# Patient Record
Sex: MALE | Race: BLACK OR AFRICAN AMERICAN
[De-identification: names, ages, dates, MRNs, and addresses within clinical notes are randomized per-mention and may not be internally consistent; named-entity substitution may affect disease eponyms.]

---

## 2019-01-09 ENCOUNTER — HOSPITAL ENCOUNTER (INPATIENT)
Dept: HOSPITAL 74 - JER | Age: 59
LOS: 9 days | Discharge: ANOTHER HOSPITAL | DRG: 378 | End: 2019-01-18
Attending: INTERNAL MEDICINE | Admitting: INTERNAL MEDICINE
Payer: COMMERCIAL

## 2019-01-09 VITALS — BODY MASS INDEX: 34.7 KG/M2

## 2019-01-09 DIAGNOSIS — F20.9: ICD-10-CM

## 2019-01-09 DIAGNOSIS — K52.9: ICD-10-CM

## 2019-01-09 DIAGNOSIS — R00.0: ICD-10-CM

## 2019-01-09 DIAGNOSIS — K63.5: ICD-10-CM

## 2019-01-09 DIAGNOSIS — K40.20: ICD-10-CM

## 2019-01-09 DIAGNOSIS — E78.5: ICD-10-CM

## 2019-01-09 DIAGNOSIS — K29.60: ICD-10-CM

## 2019-01-09 DIAGNOSIS — K64.8: ICD-10-CM

## 2019-01-09 DIAGNOSIS — Z91.14: ICD-10-CM

## 2019-01-09 DIAGNOSIS — D62: ICD-10-CM

## 2019-01-09 DIAGNOSIS — I10: ICD-10-CM

## 2019-01-09 DIAGNOSIS — I24.8: ICD-10-CM

## 2019-01-09 DIAGNOSIS — E11.65: ICD-10-CM

## 2019-01-09 DIAGNOSIS — B19.20: ICD-10-CM

## 2019-01-09 DIAGNOSIS — K62.5: ICD-10-CM

## 2019-01-09 DIAGNOSIS — K44.9: ICD-10-CM

## 2019-01-09 DIAGNOSIS — K57.91: Primary | ICD-10-CM

## 2019-01-09 DIAGNOSIS — K57.90: ICD-10-CM

## 2019-01-09 LAB
ALBUMIN SERPL-MCNC: 2.6 G/DL (ref 3.4–5)
ALP SERPL-CCNC: 46 U/L (ref 45–117)
ALT SERPL-CCNC: 10 U/L (ref 13–61)
ANION GAP SERPL CALC-SCNC: 5 MMOL/L (ref 8–16)
AST SERPL-CCNC: 5 U/L (ref 15–37)
BASOPHILS # BLD: 0.4 % (ref 0–2)
BILIRUB SERPL-MCNC: 0.3 MG/DL (ref 0.2–1)
BNP SERPL-MCNC: 984.2 PG/ML (ref 5–125)
BUN SERPL-MCNC: 18 MG/DL (ref 7–18)
CALCIUM SERPL-MCNC: 8.2 MG/DL (ref 8.5–10.1)
CHLORIDE SERPL-SCNC: 108 MMOL/L (ref 98–107)
CO2 SERPL-SCNC: 27 MMOL/L (ref 21–32)
CREAT SERPL-MCNC: 1.6 MG/DL (ref 0.55–1.3)
DEPRECATED RDW RBC AUTO: 12.5 % (ref 11.9–15.9)
EOSINOPHIL # BLD: 0.5 % (ref 0–4.5)
GLUCOSE SERPL-MCNC: 236 MG/DL (ref 74–106)
HCT VFR BLD CALC: 27.8 % (ref 35.4–49)
HGB BLD-MCNC: 9.5 GM/DL (ref 11.7–16.9)
INR BLD: 1.11 (ref 0.83–1.09)
LYMPHOCYTES # BLD: 9.5 % (ref 8–40)
MCH RBC QN AUTO: 32.3 PG (ref 25.7–33.7)
MCHC RBC AUTO-ENTMCNC: 34.2 G/DL (ref 32–35.9)
MCV RBC: 94.4 FL (ref 80–96)
MONOCYTES # BLD AUTO: 7.1 % (ref 3.8–10.2)
NEUTROPHILS # BLD: 82.5 % (ref 42.8–82.8)
PLATELET # BLD AUTO: 215 K/MM3 (ref 134–434)
PMV BLD: 8.5 FL (ref 7.5–11.1)
POTASSIUM SERPLBLD-SCNC: 4.1 MMOL/L (ref 3.5–5.1)
PROT SERPL-MCNC: 6.1 G/DL (ref 6.4–8.2)
PT PNL PPP: 13.1 SEC (ref 9.7–13)
RBC # BLD AUTO: 2.95 M/MM3 (ref 4–5.6)
SODIUM SERPL-SCNC: 140 MMOL/L (ref 136–145)
WBC # BLD AUTO: 8.3 K/MM3 (ref 4–10)

## 2019-01-09 PROCEDURE — P9038 RBC IRRADIATED: HCPCS

## 2019-01-09 PROCEDURE — P9058 RBC, L/R, CMV-NEG, IRRAD: HCPCS

## 2019-01-09 PROCEDURE — A9538 TC99M PYROPHOSPHATE: HCPCS

## 2019-01-09 PROCEDURE — P9017 PLASMA 1 DONOR FRZ W/IN 8 HR: HCPCS

## 2019-01-09 PROCEDURE — 30233N1 TRANSFUSION OF NONAUTOLOGOUS RED BLOOD CELLS INTO PERIPHERAL VEIN, PERCUTANEOUS APPROACH: ICD-10-PCS | Performed by: INTERNAL MEDICINE

## 2019-01-09 NOTE — HP
Admitting History and Physical





- Primary Care Physician


PCP: Luz Maria Calzada





- Admission


History of Present Illness: 


58 year old female, with a significant PMH of HTN, HLD, DM, and hepatitis C (

noncompliant with all of his prescribed medications), who presents to the 

emergency department today complaining of rectal bleeding for two days. Patient 

presents from [facility], with bright red blood dripping from his rectum down 

his right leg to his right foot. He does note a history of the same episode, 

but did not bother to have it checked and treated by a physician. Patient also 

complains of orthopnea upon examination in the ED today. He denies any 

abdominal pain. Patients HPI is limited secondary to patients unwillingness 

to cooperate, and is only concerned with going home. 








- Past Medical History


Cardiovascular: Yes: HTN, Hyperlipdemia


Endocrine: Yes: Diabetes Mellitus





- Smoking History


Smoking history: Unknown if ever smoked





Home Medications





- Allergies


Allergies/Adverse Reactions: 


 Allergies











Allergy/AdvReac Type Severity Reaction Status Date / Time


 


No Known Allergies Allergy   Verified 08/20/18 09:49














- Home Medications


Home Medications: 


Ambulatory Orders





ZyPREXA -  01/09/19 











Physical Examination


Vital Signs: 


 Vital Signs











Temperature  98.5 F   01/09/19 14:40


 


Pulse Rate  101 H  01/09/19 14:40


 


Respiratory Rate  20   01/09/19 14:40


 


Blood Pressure  113/56 L  01/09/19 14:40


 


O2 Sat by Pulse Oximetry (%)  97   01/09/19 14:40











Constitutional: Yes: No Distress


HENT: Yes: Atraumatic


Neck: Yes: Supple


Cardiovascular: Yes: Regular Rate and Rhythm


Respiratory: Yes: CTA Bilaterally


Gastrointestinal: Yes: Normal Bowel Sounds


Extremities: Yes: WNL


Edema: No


Peripheral Pulses WNL: Yes


Neurological: Yes: Alert, Oriented


Labs: 


 CBC, BMP





 01/09/19 12:47 





 01/09/19 12:47 











Imaging





- Results


X-ray: Report Reviewed





Problem List





- Problems


(1) Rectal bleeding


Assessment/Plan: 


not since in ER


stable


monitor h/h


npo


ivf


iv protonix


Code(s): K62.5 - HEMORRHAGE OF ANUS AND RECTUM   





(2) Benign essential HTN


Code(s): I10 - ESSENTIAL (PRIMARY) HYPERTENSION   





Assessment/Plan





 Laboratory Tests











  01/09/19 01/09/19 01/09/19





  12:47 12:47 12:47


 


WBC  8.3  


 


RBC  2.95 L  


 


Hgb  9.5 L  


 


Hct  27.8 L D  


 


MCV  94.4  


 


MCH  32.3  


 


MCHC  34.2  


 


RDW  12.5  


 


Plt Count  215  


 


MPV  8.5  D  


 


Absolute Neuts (auto)  6.8  


 


Neutrophils %  82.5  


 


Lymphocytes %  9.5  D  


 


Monocytes %  7.1  


 


Eosinophils %  0.5  


 


Basophils %  0.4  


 


Nucleated RBC %  0  


 


PT with INR   13.10 H 


 


INR   1.11 H 


 


Sodium   


 


Potassium   


 


Chloride   


 


Carbon Dioxide   


 


Anion Gap   


 


BUN   


 


Creatinine   


 


Creat Clearance w eGFR   


 


Random Glucose   


 


Calcium   


 


Total Bilirubin   


 


AST   


 


ALT   


 


Alkaline Phosphatase   


 


Creatine Kinase   


 


Troponin I   


 


B-Natriuretic Peptide   


 


Total Protein   


 


Albumin   


 


Stool Occult Blood    Positive


 


Blood Type   


 


Antibody Screen   














  01/09/19 01/09/19





  12:47 12:47


 


WBC  


 


RBC  


 


Hgb  


 


Hct  


 


MCV  


 


MCH  


 


MCHC  


 


RDW  


 


Plt Count  


 


MPV  


 


Absolute Neuts (auto)  


 


Neutrophils %  


 


Lymphocytes %  


 


Monocytes %  


 


Eosinophils %  


 


Basophils %  


 


Nucleated RBC %  


 


PT with INR  


 


INR  


 


Sodium  140 


 


Potassium  4.1 


 


Chloride  108 H 


 


Carbon Dioxide  27 


 


Anion Gap  5 L 


 


BUN  18 


 


Creatinine  1.6 H 


 


Creat Clearance w eGFR  44.94 


 


Random Glucose  236 H 


 


Calcium  8.2 L 


 


Total Bilirubin  0.3 


 


AST  5 L 


 


ALT  10 L 


 


Alkaline Phosphatase  46 


 


Creatine Kinase  110 


 


Troponin I  0.15 H 


 


B-Natriuretic Peptide  984.2 H 


 


Total Protein  6.1 L 


 


Albumin  2.6 L 


 


Stool Occult Blood  


 


Blood Type   O POSITIVE


 


Antibody Screen   Negative








Active Medications











Generic Name Dose Route Start Last Admin





  Trade Name Freq  PRN Reason Stop Dose Admin


 


Sodium Chloride  1,000 mls @ 100 mls/hr  01/09/19 14:00  01/09/19 14:02





  Normal Saline -  IV  01/09/19 23:59  100 mls/hr





  ASDIR STA   Administration

## 2019-01-09 NOTE — EKG
Test Reason : 

Blood Pressure : ***/*** mmHG

Vent. Rate : 114 BPM     Atrial Rate : 114 BPM

   P-R Int : 140 ms          QRS Dur : 082 ms

    QT Int : 334 ms       P-R-T Axes : 032 002 143 degrees

   QTc Int : 460 ms

 

SINUS TACHYCARDIA

T WAVE ABNORMALITY, CONSIDER LATERAL ISCHEMIA

ABNORMAL ECG

NO PREVIOUS ECGS AVAILABLE

Confirmed by ANNALEE KULKARNI, CIPRIANO ([id number]) on 1/9/2019 3:46:12 PM

 

Referred By:             Confirmed By:CIPRIANO MANTILLA MD

## 2019-01-09 NOTE — PDOC
History of Present Illness





- History of Present Illness


Initial Comments: 


The patient is a 58 year old female, with a significant PMH of HTN, HLD, DM, 

and hepatitis C (noncompliant with all of his prescribed medications), who 

presents to the emergency department today complaining of rectal bleeding for 

two days. Patient presents from [facility], with bright red blood dripping from 

his rectum down his right leg to his right foot. He does note a history of the 

same episode, but did not bother to have it checked and treated by a physician. 

Patient also complains of orthopnea upon examination in the ED today. He denies 

any abdominal pain. Patients HPI is limited secondary to patients 

unwillingness to cooperate, and is only concerned with going home. 





The patient denies chest pain, headache and dizziness.


Denies fever, chills, nausea, vomit, diarrhea and constipation.


Denies dysuria, frequency, urgency and hematuria.





Allergies: NKA


Past surgical history: None reported


Social history: Current smoker


PCP: Doesnt recall





19 13:47








<Pooja Lopez - Last Filed: 19 16:17>





- General


History Source: Patient


Exam Limitations: No Limitations





<Landy Wilhelm - Last Filed: 19 10:11>





- General


Chief Complaint: Rectal Bleed


Stated Complaint: Rectal Bleed


Time Seen by Provider: 19 12:11





Past History





<Pooja Lopez - Last Filed: 19 16:17>





- Suicide/Smoking/Psychosocial Hx


Smoking History: Unknown if ever smoked





<Landy Wilhelm - Last Filed: 19 10:11>





- Past Medical History


Allergies/Adverse Reactions: 


 Allergies











Allergy/AdvReac Type Severity Reaction Status Date / Time


 


No Known Allergies Allergy   Verified 18 09:49











Home Medications: 


Ambulatory Orders





ZyPREXA -  19 











**Review of Systems





- Review of Systems


Comments:: 


GENERAL/CONSTITUTIONAL: No fever or chills. No weakness.


HEAD, EYES, EARS, NOSE AND THROAT: No change in vision. No ear pain or 

discharge. No sore throat.


CARDIOVASCULAR: +Orthopnea. No chest pain.


RESPIRATORY: No cough, wheezing, or hemoptysis.


GASTROINTESTINAL: +Rectal bleeding (bright-red blood). No nausea, vomiting, 

diarrhea or constipation.


GENITOURINARY: No dysuria, frequency, or change in urination.


MUSCULOSKELETAL: No joint or muscle swelling or pain. No neck or back pain.


SKIN: No rash


NEUROLOGIC: No headache, vertigo, loss of consciousness, or change in strength/

sensation.


ENDOCRINE: No increased thirst. No abnormal weight change.


HEMATOLOGIC/LYMPHATIC: No anemia, easy bleeding, or history of blood clots.


ALLERGIC/IMMUNOLOGIC: No hives or skin allergy.





19 13:52








<Pooja Lopez - Last Filed: 19 16:17>





*Physical Exam





- Vital Signs


 Last Vital Signs











Temp Pulse Resp BP Pulse Ox


 


 98.4 F   112 H  18   110/56 L  97 


 


 19 11:44  19 11:44  19 11:44  19 11:44  19 12:50














- Physical Exam


Comments: 


GENERAL: The patient is awake, but unwilling to cooperate.


HEAD: Normal with no signs of trauma.


EYES: PERRLA, EOMI, sclera anicteric, conjunctiva clear.


ENT: Ears normal, nares patent, oropharynx clear without exudates. Moist mucous 

membranes.


NECK: Normal range of motion, supple without lymphadenopathy, JVD, or masses.


LUNGS: Breath sounds equal, clear to auscultation bilaterally. No wheezes, and 

no crackles.


HEART: +Tachycardic. Normal S1 and S2 without murmur, rub or gallop.


ABDOMEN: Soft, nontender, normoactive bowel sounds. No guarding, no rebound. No 

masses palpable.


RECTAL: +Clearly dried blood in rectum, traveling all the way down his right 

LE. No external hemorrhoids. 


EXTREMITIES: +Clearly  blood down right leg to right foot, soaking pant and 

sock. Normal range of motion, no edema. No clubbing or cyanosis. No erythema, 

or tenderness.


NEUROLOGICAL: Cranial nerves II through XII grossly intact. Normal speech. No 

focal neurological deficits.


MUSCULOSKELETAL: Back non-tender to palpation, no CVA tenderness


SKIN: +Dry, flaky skin. +Unkempt toenails. Warm, normal turgor, no rashes or 

lesions noted.





19 14:10








<Pooja Lopez - Last Filed: 19 16:17>





- Vital Signs


 Last Vital Signs











Temp Pulse Resp BP Pulse Ox


 


 98.4 F   112 H  18   110/56 L  100 


 


 19 11:44  19 11:44  19 11:44  19 11:44  19 11:44














<Landy Wilhelm - Last Filed: 19 10:11>





Moderate Sedation





- Procedure Monitoring


Vital Signs: 


Procedure Monitoring Vital Signs











Temperature  98.4 F   19 11:44


 


Pulse Rate  112 H  19 11:44


 


Respiratory Rate  18   19 11:44


 


Blood Pressure  110/56 L  19 11:44


 


O2 Sat by Pulse Oximetry (%)  97   19 12:50











<Pooja Lopez - Last Filed: 19 16:17>





- Procedure Monitoring


Vital Signs: 


Procedure Monitoring Vital Signs











Temperature  98.4 F   19 11:44


 


Pulse Rate  112 H  19 11:44


 


Respiratory Rate  18   19 11:44


 


Blood Pressure  110/56 L  19 11:44


 


O2 Sat by Pulse Oximetry (%)  100   19 11:44











<Landy Wilhelm - Last Filed: 19 10:11>





**Heart Score/ECG Review





- ECG Intrepretation


Comment:: 


Sinus tachycardia. T wave abnormality, consider later ischemia. Abnormal ECG.


19 13:00








<Pooja Lopez - Last Filed: 19 16:17>





ED Treatment Course





- LABORATORY


CBC & Chemistry Diagram: 


 19 12:47





 19 12:47





- RADIOLOGY


Radiograph Interpretation: 


EXAM#: TYPE/EXAM: RESULT: [id number] RAD/CHEST X-RAY PORTABLE*


Impression. Cardiomegaly. No evidence of pneumonia, CHF, pleural effusion. 


Reported By: Deshaun Garcia MD 19 13:25 


19 13:54








- Consult/PCP


Time Called: 13:38


Case discussed with personal care physician: Luz Maria Calzada (Spoke with Dr. Calzada concerning patient's care )


Case discussed with consulting physician: Verónica Nieves (4:15 Spoke with Dr. Nieves concerning patient's care)





<Pooja Lopez - Last Filed: 19 16:17>





- LABORATORY


CBC & Chemistry Diagram: 


 19 05:30





 19 05:30





<Landy Wilhelm - Last Filed: 19 10:11>





Medical Decision Making





- Critical Care Time


Total Critical Care Time (minutes): 60


Critical Care Statement: The care of this patient involved high complexity 

decision making to prevent further life threatening deterioration of the patient

's condition and/or to evaluate & treat vital organ system(s) failure or risk 

of failure.





- Medical Decision Making





19 13:13





EKG - NSR rate of 114 bpm, axis nml, intervals nml, no st elevation or 

depression, T wave inversion I, aVL, v5, v5





 Laboratory Tests











  16





  09:50 12:47 12:47


 


WBC  7.7  D  8.3 


 


Hgb  12.3  9.5 L 


 


Hct  38.3  27.8 L D 


 


Plt Count  263  215 


 


INR    1.11 H


 


Stool Occult Blood   














  19





  12:47


 


WBC 


 


Hgb 


 


Hct 


 


Plt Count 


 


INR 


 


Stool Occult Blood  Positive











19 14:24


 Laboratory Tests











  19





  12:47


 


Creatine Kinase  110


 


Troponin I  0.15 H


 


B-Natriuretic Peptide  984.2 H








Pt admitted to Dr Elsi Harkins intermediate


Will admit to tele


Call placed to GI





Clinical Impression: rectal bleeding, initial presentation


Hgb degreased from prior, initial presentation


Abnormal troponin, initial presentation








<Landy Wilhelm - Last Filed: 19 10:11>





*DC/Admit/Observation/Transfer





- Attestations


Scribe Attestion: 


Documentation prepared by DAVID Francis, acting as medical scribe for 

Landy Wilhelm MD/DO.


19 13:53








<Pooja Lopez - Last Filed: 19 16:17>





- Discharge Dispostion


Decision to Admit order: Yes





<Landy Wilhelm - Last Filed: 19 10:11>


Diagnosis at time of Disposition: 


 Rectal bleeding








- Discharge Dispostion


Condition at time of disposition: Fair

## 2019-01-09 NOTE — CONS
DATE OF CONSULTATION:  

 

DATE OF DICTATION:  01/09/2019

 

GASTROENTEROLOGY CONSULTATION 

 

HISTORY OF PRESENT ILLNESS:  The patient is a 58-year-old man with a past medical

history significant for hypertension, hyperlipidemia, diabetes, hepatitis C, who is

noncompliant with medications.  He presents to the emergency room with complaints of

a 2-day history of intermittent hematochezia.  He has had similar episodes in the

past but never bothered to have any evaluation performed.  He also complained of some

intermittent shortness of breath in the emergency room.  He states that he has had

intermittent abdominal pain located in the lower quadrant, denies nausea, vomiting,

hematemesis, diarrhea, or constipation or melena.  He is unclear about his history of

endoscopic evaluation.  He claims he has had colonoscopy approximately 10 years ago. 

He does not want to remain in the hospital and wishes to be discharged.

 

PAST MEDICAL AND SURGICAL HISTORY:  As listed in the HPI.

 

ALLERGIES:  No known drug allergies.

 

SOCIAL HISTORY:  He smokes cigarettes, drinks occasionally, no intravenous drug

abuse.  

 

FAMILY HISTORY:  Noncontributory.  

 

PHYSICAL EXAMINATION:

VITAL SIGNS:  Temperature 98, pulse 101, blood pressure 113/56, respirations 12,

oxygen saturation 97% on room air.  

GENERAL:  In no acute distress.  

HEENT:  Anicteric sclerae.  

CARDIOVASCULAR:  S1, S2, regular rate and rhythm.   

LUNGS:  Bilaterally clear to auscultation.  

ABDOMEN:  Soft, nontender.  

EXTREMITIES:  No edema.  

 

LABORATORY:  White blood cell count 8.3, hemoglobin and hematocrit 9.5/27, MCV 94,

platelet count 215, INR 1.1, sodium 140, potassium 4.1, BUN/creatinine 18/1.6, total

bilirubin 0.3, AST 5, ALT 10, alkaline phosphatase 46, troponin 0.15, .  

 

Chest x-ray reveals cardiomegaly, no evidence of pneumonia, CHF, or pleural effusion.

 

 

IMPRESSION:  Hematochezia with intermittent complaints of lower abdominal pain

including a differential diagnosis is colitis, _____, adenocarcinoma cannot be

excluded at this time.  He is hemodynamically stable without sign of an overt

gastrointestinal bleed.

 

RECOMMENDATION:  Considering he is having abdominal pain, I will order a CT scan of

the abdomen and pelvis to further evaluate his pain.  Will place him also on a clear

liquid diet.  If CT scan is within normal limits, he would benefit from a diagnostic

upper endoscopy and colonoscopy, this can be done on Friday.  Avoid NSAID.  Trend

hemoglobin and hematocrit daily while hospitalized.  

 

 

DO WILLY GORDILLO/[id number]

DD: 01/09/2019 19:16

DT: 01/09/2019 20:09

Job #:  [id number]

## 2019-01-10 LAB
ALBUMIN SERPL-MCNC: 2.4 G/DL (ref 3.4–5)
ALP SERPL-CCNC: 43 U/L (ref 45–117)
ALT SERPL-CCNC: 9 U/L (ref 13–61)
ANION GAP SERPL CALC-SCNC: 4 MMOL/L (ref 8–16)
AST SERPL-CCNC: 7 U/L (ref 15–37)
BASOPHILS # BLD: 0.3 % (ref 0–2)
BILIRUB SERPL-MCNC: 0.2 MG/DL (ref 0.2–1)
BUN SERPL-MCNC: 12 MG/DL (ref 7–18)
CALCIUM SERPL-MCNC: 7.9 MG/DL (ref 8.5–10.1)
CHLORIDE SERPL-SCNC: 110 MMOL/L (ref 98–107)
CO2 SERPL-SCNC: 27 MMOL/L (ref 21–32)
CREAT SERPL-MCNC: 1.1 MG/DL (ref 0.55–1.3)
DEPRECATED RDW RBC AUTO: 12.4 % (ref 11.9–15.9)
DEPRECATED RDW RBC AUTO: 12.4 % (ref 11.9–15.9)
EOSINOPHIL # BLD: 1.2 % (ref 0–4.5)
GLUCOSE SERPL-MCNC: 140 MG/DL (ref 74–106)
HCT VFR BLD CALC: 24 % (ref 35.4–49)
HCT VFR BLD CALC: 24.4 % (ref 35.4–49)
HGB BLD-MCNC: 7.8 GM/DL (ref 11.7–16.9)
HGB BLD-MCNC: 8.3 GM/DL (ref 11.7–16.9)
LYMPHOCYTES # BLD: 24.6 % (ref 8–40)
MCH RBC QN AUTO: 30.7 PG (ref 25.7–33.7)
MCH RBC QN AUTO: 32 PG (ref 25.7–33.7)
MCHC RBC AUTO-ENTMCNC: 32.3 G/DL (ref 32–35.9)
MCHC RBC AUTO-ENTMCNC: 34 G/DL (ref 32–35.9)
MCV RBC: 94.1 FL (ref 80–96)
MCV RBC: 95.1 FL (ref 80–96)
MONOCYTES # BLD AUTO: 10.8 % (ref 3.8–10.2)
NEUTROPHILS # BLD: 63.1 % (ref 42.8–82.8)
PLATELET # BLD AUTO: 180 K/MM3 (ref 134–434)
PLATELET # BLD AUTO: 215 K/MM3 (ref 134–434)
PMV BLD: 8.1 FL (ref 7.5–11.1)
PMV BLD: 8.6 FL (ref 7.5–11.1)
POTASSIUM SERPLBLD-SCNC: 4.1 MMOL/L (ref 3.5–5.1)
PROT SERPL-MCNC: 5.5 G/DL (ref 6.4–8.2)
RBC # BLD AUTO: 2.53 M/MM3 (ref 4–5.6)
RBC # BLD AUTO: 2.6 M/MM3 (ref 4–5.6)
SODIUM SERPL-SCNC: 142 MMOL/L (ref 136–145)
WBC # BLD AUTO: 8 K/MM3 (ref 4–10)
WBC # BLD AUTO: 8.2 K/MM3 (ref 4–10)

## 2019-01-10 NOTE — CON.CARD
Consult


Consult Specialty:: Cardiology


Referred by:: Luz Maria Calzada MD


Reason for Consultation:: Pre-procedural cardiovascular evaluation





- History of Present Illness


Chief Complaint: Hematochezia


History of Present Illness: 


58 year old female, with a significant PMH of HTN, HLD, DM, schizophrenia, and 

hepatitis C (noncompliant with all of his prescribed medications), presented 

with rectal bleeding for two days without abdominal pain, nausea, emesis, fevers

, chills CT scan shows right-sided colitis with bilateral inguinal hernias. 

Hematochezia has since resolved, he reports being asymptomatic from CV-

standpoint and denies chest pain, dyspnea, near or true syncope, palpitations, 

orthopnea, PND, LE edema. Tolerating colonoscopy prep.





- Past Medical History


Cardio/Vascular: Yes: HTN, Hyperlipdemia


Endocrine: Yes: Diabetes Mellitus





- Alcohol/Substance Use


Hx Alcohol Use: Yes





- Smoking History


Smoking history: Unknown if ever smoked


Have you smoked in the past 12 months: Yes


Aproximately how many cigarettes per day: 20





Home Medications





- Allergies


Allergies/Adverse Reactions: 


 Allergies











Allergy/AdvReac Type Severity Reaction Status Date / Time


 


No Known Allergies Allergy   Verified 08/20/18 09:49














- Home Medications


Home Medications: 


Ambulatory Orders





ZyPREXA -  01/09/19 











Review of Systems





- Review of Systems


Gastrointestinal: reports: Rectal Bleeding


Vital Signs: 


 Vital Signs











Temperature  98.4 F   01/10/19 15:24


 


Pulse Rate  108 H  01/10/19 15:24


 


Respiratory Rate  18   01/10/19 15:24


 


Blood Pressure  129/79   01/10/19 15:24


 


O2 Sat by Pulse Oximetry (%)  97   01/10/19 09:00











Constitutional: Yes: No Distress, Calm


Neck: Yes: Supple


Respiratory: Yes: Regular, CTA Bilaterally


Gastrointestinal: Yes: Soft, Hernia (Bilateral inguinal hernias), Hypoactive 

Bowel Sounds, Rectal Bleeding


Cardiovascular: Yes: Tachycardia


JVD: No


Carotid Bruit: No


Heart Sounds: Yes: S1, S2


Edema: No





- Other Data


Labs, Other Data: 


 CBC, BMP





 01/10/19 13:00 





 01/10/19 06:22 





 INR, PTT











INR  1.11  (0.83-1.09)  H  01/09/19  12:47    











ST @ 114 lateral TWI








Imaging





- Results


Chest X-ray: Report Reviewed (NAD)





Problem List





- Problems


(1) Colitis


Code(s): K52.9 - NONINFECTIVE GASTROENTERITIS AND COLITIS, UNSPECIFIED   





(2) Inguinal hernia bilateral, non-recurrent


Code(s): K40.20 - BI INGUINAL HERNIA, W/O OBST OR GANGRENE, NOT SPCF AS RECUR   


Qualifiers: 


   Obstruction and gangrene presence: without obstruction or gangrene 





(3) Pre-procedural cardiovascular examination


Code(s): Z01.810 - ENCOUNTER FOR PREPROCEDURAL CARDIOVASCULAR EXAMINATION   





(4) Rectal bleeding


Code(s): K62.5 - HEMORRHAGE OF ANUS AND RECTUM   





(5) Anemia


Code(s): D64.9 - ANEMIA, UNSPECIFIED   


Qualifiers: 


   Other causes of anemia: acute posthemorrhagic 





Assessment/Plan


01/10/2019 Extensive thickening and inflammatory changes right colon c/w colitis

, large bilateral inguinal hernias





1. Hematochezia referable to right-sided colitis


2. Acute anemia


3. Bilateral inguinal hernias





P:1. Given absence of symptoms of acute coronary syndrome, decompensated CHF or 

malignant arrhythmia, may proceed with colonoscopy from CV-standpoint once 

colitis resolved


2. Clear diet, check stool studies


3. Monitor H/H and for signs of overt bleeding


4. Thank you for consultative opportunity

## 2019-01-10 NOTE — PN
Progress Note, Physician


History of Present Illness: 





no more bleeding





- Current Medication List


Current Medications: 


Active Medications





Sodium Chloride (Normal Saline -)  1,000 mls @ 100 mls/hr IV ASDIR ZENOBIA


   Last Admin: 01/09/19 23:06 Dose:  Not Given











- Objective


Vital Signs: 


 Vital Signs











Temperature  98.4 F   01/10/19 15:24


 


Pulse Rate  108 H  01/10/19 15:24


 


Respiratory Rate  18   01/10/19 15:24


 


Blood Pressure  129/79   01/10/19 15:24


 


O2 Sat by Pulse Oximetry (%)  97   01/10/19 09:00











Constitutional: Yes: No Distress


HENT: Yes: Atraumatic


Neck: Yes: Supple


Cardiovascular: Yes: Regular Rate and Rhythm


Respiratory: Yes: CTA Bilaterally


Gastrointestinal: Yes: Normal Bowel Sounds


Extremities: Yes: WNL


Edema: No


Peripheral Pulses WNL: Yes


Neurological: Yes: Alert, Oriented


Labs: 


 CBC, BMP





 01/10/19 13:00 





 01/10/19 06:22 





 INR, PTT











INR  1.11  (0.83-1.09)  H  01/09/19  12:47    














Problem List





- Problems


(1) Rectal bleeding


Assessment/Plan: 


no more 


stable


monitor h/h


npo


ivf


iv protonix


gi and cardio consults...done


for colonoscopy tomorrow


Code(s): K62.5 - HEMORRHAGE OF ANUS AND RECTUM   





(2) Benign essential HTN


Code(s): I10 - ESSENTIAL (PRIMARY) HYPERTENSION

## 2019-01-10 NOTE — PN
GI Progress Note


Subjective: 





 this mornNo overt bleeding today


hgb 7.8 this morning


Patient going to vending machine to eat solide food


Denies abdominal pain


Refused CT scan abdomen last night


  





- Objective


Vital Signs: 


 Vital Signs











Temperature  98.4 F   01/10/19 06:00


 


Pulse Rate  86   01/10/19 06:00


 


Respiratory Rate  18   01/10/19 06:00


 


Blood Pressure  120/55 L  01/10/19 06:00


 


O2 Sat by Pulse Oximetry (%)  98   01/09/19 21:00











Constitutional: Calm


Eyes: No: Sclera Icterus


Cardiovascular: Yes: Regular Rate and Rhythm


Respiratory: Yes: CTA Bilaterally


Gastrointestinal Inspection: Yes: Hernia (b/l inguinal hernias. RIH reducible).

  No: Distention


...Auscultate: Yes: Normoactive Bowel Sounds


...Palpate: No: Hepatomegaly, Splenomegaly, Tenderness, Tenderness, Rebound


...Percussion: No: Tympanitic


Edema: No (No LE edema)


Neurological: Yes: Alert


Labs: 


 CBC, BMP





 01/10/19 06:22 





 INR, PTT











INR  1.11  (0.83-1.09)  H  01/09/19  12:47    














Problem List





- Problems


(1) Rectal bleeding


Assessment/Plan: 


No overt bleeding today


Discussed colonoscopy with Mr. Huang.  Discussed potential risks of the 

procedure like but not limited to bleeding, perforation, infection, sedation, 

death  he has agreed to the procedure


Agree with CT scan.  Will help assess inguinal hernias.  Consider surical 

evaluation


Clear diet


Monitor H/H and for signs of overt bleeding.  If continued overt bleeding and 

change in hemodynamics, transfer to ICU setting





Code(s): K62.5 - HEMORRHAGE OF ANUS AND RECTUM

## 2019-01-11 LAB
AMPHET UR-MCNC: NEGATIVE NG/ML
ANION GAP SERPL CALC-SCNC: 2 MMOL/L (ref 8–16)
APPEARANCE UR: CLEAR
BARBITURATES UR-MCNC: NEGATIVE NG/ML
BASOPHILS # BLD: 0.5 % (ref 0–2)
BENZODIAZ UR SCN-MCNC: NEGATIVE NG/ML
BILIRUB UR STRIP.AUTO-MCNC: NEGATIVE MG/DL
BUN SERPL-MCNC: 6 MG/DL (ref 7–18)
CALCIUM SERPL-MCNC: 8 MG/DL (ref 8.5–10.1)
CHLORIDE SERPL-SCNC: 108 MMOL/L (ref 98–107)
CO2 SERPL-SCNC: 30 MMOL/L (ref 21–32)
COCAINE UR-MCNC: NEGATIVE NG/ML
COLOR UR: YELLOW
CREAT SERPL-MCNC: 1.1 MG/DL (ref 0.55–1.3)
DEPRECATED RDW RBC AUTO: 12.4 % (ref 11.9–15.9)
EOSINOPHIL # BLD: 1.6 % (ref 0–4.5)
EPITH CASTS URNS QL MICRO: (no result) /HPF
GLUCOSE SERPL-MCNC: 137 MG/DL (ref 74–106)
HCT VFR BLD CALC: 23.4 % (ref 35.4–49)
HGB BLD-MCNC: 7.5 GM/DL (ref 11.7–16.9)
KETONES UR QL STRIP: NEGATIVE
LEUKOCYTE ESTERASE UR QL STRIP.AUTO: NEGATIVE
LYMPHOCYTES # BLD: 33.5 % (ref 8–40)
MCH RBC QN AUTO: 30.6 PG (ref 25.7–33.7)
MCHC RBC AUTO-ENTMCNC: 31.9 G/DL (ref 32–35.9)
MCV RBC: 95.7 FL (ref 80–96)
METHADONE UR-MCNC: NEGATIVE NG/ML
MONOCYTES # BLD AUTO: 8.9 % (ref 3.8–10.2)
MUCOUS THREADS URNS QL MICRO: (no result)
NEUTROPHILS # BLD: 55.5 % (ref 42.8–82.8)
NITRITE UR QL STRIP: NEGATIVE
OPIATES UR QL SCN: NEGATIVE NG/ML
PCP UR QL SCN: NEGATIVE NG/ML
PH UR: 5 [PH] (ref 5–8)
PLATELET # BLD AUTO: 200 K/MM3 (ref 134–434)
PMV BLD: 8.5 FL (ref 7.5–11.1)
POTASSIUM SERPLBLD-SCNC: 3.9 MMOL/L (ref 3.5–5.1)
PROT UR QL STRIP: (no result)
PROT UR QL STRIP: (no result)
RBC # BLD AUTO: 2.45 M/MM3 (ref 4–5.6)
SODIUM SERPL-SCNC: 139 MMOL/L (ref 136–145)
SP GR UR: 1.02 (ref 1.01–1.03)
UROBILINOGEN UR STRIP-MCNC: 2 MG/DL (ref 0.2–1)
WBC # BLD AUTO: 8.4 K/MM3 (ref 4–10)

## 2019-01-11 NOTE — PN
Progress Note, Physician


History of Present Illness: 





no more bleeding





- Current Medication List


Current Medications: 


Active Medications





Sodium Chloride (Normal Saline -)  1,000 mls @ 100 mls/hr IV ASDIR ZENOBIA


   Last Admin: 01/09/19 23:06 Dose:  Not Given











- Objective


Vital Signs: 


 Vital Signs











Temperature  98.3 F   01/11/19 15:24


 


Pulse Rate  93 H  01/11/19 15:24


 


Respiratory Rate  18   01/11/19 15:24


 


Blood Pressure  135/71   01/11/19 15:24


 


O2 Sat by Pulse Oximetry (%)  98   01/10/19 21:00











Constitutional: Yes: No Distress


HENT: Yes: Atraumatic


Neck: Yes: Supple


Cardiovascular: Yes: Regular Rate and Rhythm


Respiratory: Yes: CTA Bilaterally


Gastrointestinal: Yes: Normal Bowel Sounds


Extremities: Yes: WNL


Neurological: Yes: Alert, Oriented


Labs: 


 CBC, BMP





 01/11/19 05:30 





 01/11/19 05:30 





 INR, PTT











INR  1.11  (0.83-1.09)  H  01/09/19  12:47    














Problem List





- Problems


(1) Rectal bleeding


Assessment/Plan: 


no more 


stable


monitor h/h


iv protonix


gi and cardio consults...done


for colonoscopy was cancelled for today


possibly on monday


Code(s): K62.5 - HEMORRHAGE OF ANUS AND RECTUM   





(2) Benign essential HTN


Code(s): I10 - ESSENTIAL (PRIMARY) HYPERTENSION

## 2019-01-11 NOTE — PN
Progress Note (short form)





- Note


Progress Note: 





Patient drank almost a quarter of prep and had 2 brown BM's yesterday, non 

overnight.  No bleeding.  CT scan revealing right sided colitis and b/; 

inguinal hernias.  The right hernia contains cecum and portion of terminal ileum

,  The left hernia contains portion of sigmoid.  Given that right sided colitis 

was observed, explained that complete bowel prep will be needed for 

visualization.  The left inguinal hernia was reducible.  The right inguinal 

hernia may preclude complete evaluation of it cannot be reduced the time of the 

procedure. EGD/Colon cancelled for today, plan for monday.  Transfuse 1 U PRBC





Problem List





- Problems


(1) Rectal bleeding


Code(s): K62.5 - HEMORRHAGE OF ANUS AND RECTUM

## 2019-01-11 NOTE — PN
Progress Note, Physician


History of Present Illness: 


Denies recurrent hematochezia or abd pain. 





- Current Medication List


Current Medications: 


Active Medications





Sodium Chloride (Normal Saline -)  1,000 mls @ 100 mls/hr IV ASDIR ZENOBIA


   Last Admin: 01/09/19 23:06 Dose:  Not Given











- Objective


Vital Signs: 


 Vital Signs











Temperature  98.6 F   01/11/19 02:00


 


Pulse Rate  87   01/11/19 02:00


 


Respiratory Rate  18   01/11/19 02:00


 


Blood Pressure  138/76   01/11/19 02:00


 


O2 Sat by Pulse Oximetry (%)  98   01/10/19 21:00











Constitutional: Yes: No Distress, Calm


Neck: Yes: Supple


Cardiovascular: Yes: Regular Rate and Rhythm


Respiratory: Yes: Regular, CTA Bilaterally


Gastrointestinal: Yes: Soft, Hypoactive Bowel Sounds


Edema: No


Labs: 


 CBC, BMP





 01/11/19 05:30 





 01/11/19 05:30 





 INR, PTT











INR  1.11  (0.83-1.09)  H  01/09/19  12:47    














- ....Imaging


EKG: Report Reviewed (Tele: NSR)





Problem List





- Problems


(1) Colitis


Code(s): K52.9 - NONINFECTIVE GASTROENTERITIS AND COLITIS, UNSPECIFIED   





(2) Inguinal hernia bilateral, non-recurrent


Code(s): K40.20 - BI INGUINAL HERNIA, W/O OBST OR GANGRENE, NOT SPCF AS RECUR   


Qualifiers: 


   Obstruction and gangrene presence: without obstruction or gangrene 





(3) Pre-procedural cardiovascular examination


Code(s): Z01.810 - ENCOUNTER FOR PREPROCEDURAL CARDIOVASCULAR EXAMINATION   





(4) Rectal bleeding


Code(s): K62.5 - HEMORRHAGE OF ANUS AND RECTUM   





(5) Anemia


Code(s): D64.9 - ANEMIA, UNSPECIFIED   


Qualifiers: 


   Other causes of anemia: acute posthemorrhagic 





Assessment/Plan


01/10/2019 Extensive thickening and inflammatory changes right colon c/w colitis

, large bilateral inguinal hernias





1. Hematochezia referable to right-sided colitis


2. Acute anemia


3. Bilateral inguinal hernias





P:1. Given absence of symptoms of acute coronary syndrome, decompensated CHF or 

malignant arrhythmia, may proceed with EGD/colonoscopy from CV-standpoint once 

colitis resolved


2. Clear diet, check stool studies, transfuse to maintain Hgb>8.0


3. Monitor H/H and for signs of overt bleeding


4. D/c telemetry

## 2019-01-12 LAB
ANISOCYTOSIS BLD QL: (no result)
BASOPHILS # BLD: 0.4 % (ref 0–2)
DEPRECATED RDW RBC AUTO: 12.9 % (ref 11.9–15.9)
EOSINOPHIL # BLD: 1.3 % (ref 0–4.5)
HCT VFR BLD CALC: 26.8 % (ref 35.4–49)
HGB BLD-MCNC: 8.6 GM/DL (ref 11.7–16.9)
LYMPHOCYTES # BLD: 36.3 % (ref 8–40)
MACROCYTES BLD QL: 0
MCH RBC QN AUTO: 30.5 PG (ref 25.7–33.7)
MCHC RBC AUTO-ENTMCNC: 32 G/DL (ref 32–35.9)
MCV RBC: 95.2 FL (ref 80–96)
MONOCYTES # BLD AUTO: 7.3 % (ref 3.8–10.2)
NEUTROPHILS # BLD: 54.7 % (ref 42.8–82.8)
PLATELET # BLD AUTO: 283 K/MM3 (ref 134–434)
PLATELET BLD QL SMEAR: NORMAL
PMV BLD: 8.5 FL (ref 7.5–11.1)
RBC # BLD AUTO: 2.81 M/MM3 (ref 4–5.6)
WBC # BLD AUTO: 7.9 K/MM3 (ref 4–10)

## 2019-01-12 RX ADMIN — PANTOPRAZOLE SODIUM SCH MG: 40 TABLET, DELAYED RELEASE ORAL at 19:22

## 2019-01-12 NOTE — PN
Progress Note, Physician


History of Present Illness: 


Denies recurrent hematochezia or abd pain. 





- Current Medication List


Current Medications: 


Active Medications





Bisacodyl (Dulcolax -)  20 mg PO ONCE ONE


   Stop: 01/13/19 13:01











- Objective


Vital Signs: 


 Vital Signs











Temperature  98.2 F   01/12/19 09:07


 


Pulse Rate  96 H  01/12/19 09:07


 


Respiratory Rate  20   01/12/19 09:07


 


Blood Pressure  138/69   01/12/19 09:07


 


O2 Sat by Pulse Oximetry (%)  96   01/12/19 09:17











Constitutional: Yes: No Distress, Calm


Neck: Yes: Supple


Cardiovascular: Yes: Regular Rate and Rhythm


Respiratory: Yes: Regular, Diminished


Gastrointestinal: Yes: Soft, Hypoactive Bowel Sounds


Edema: No


Labs: 


 CBC, BMP





 01/12/19 07:00 





 01/11/19 05:30 





 INR, PTT











INR  1.11  (0.83-1.09)  H  01/09/19  12:47    














Problem List





- Problems


(1) Colitis


Code(s): K52.9 - NONINFECTIVE GASTROENTERITIS AND COLITIS, UNSPECIFIED   





(2) Inguinal hernia bilateral, non-recurrent


Code(s): K40.20 - BI INGUINAL HERNIA, W/O OBST OR GANGRENE, NOT SPCF AS RECUR   


Qualifiers: 


   Obstruction and gangrene presence: without obstruction or gangrene 





(3) Pre-procedural cardiovascular examination


Code(s): Z01.810 - ENCOUNTER FOR PREPROCEDURAL CARDIOVASCULAR EXAMINATION   





(4) Rectal bleeding


Code(s): K62.5 - HEMORRHAGE OF ANUS AND RECTUM   





(5) Anemia


Code(s): D64.9 - ANEMIA, UNSPECIFIED   


Qualifiers: 


   Other causes of anemia: acute posthemorrhagic 





Assessment/Plan


01/10/2019 Extensive thickening and inflammatory changes right colon c/w colitis

, large bilateral inguinal hernias





1. Hematochezia referable to right-sided colitis


2. Acute anemia


3. Bilateral inguinal hernias





P:1. Given absence of symptoms of acute coronary syndrome, decompensated CHF or 

malignant arrhythmia, may proceed with EGD/colonoscopy from CV-standpoint once 

colitis resolved


2. Clear diet, check stool studies, transfuse to maintain Hgb>8.0


3. Monitor H/H and for signs of overt bleeding

## 2019-01-12 NOTE — PN
Progress Note, Physician





- Current Medication List


Current Medications: 


Active Medications





Bisacodyl (Dulcolax -)  20 mg PO ONCE ONE


   Stop: 01/13/19 13:01











- Objective


Vital Signs: 


 Vital Signs











Temperature  98.2 F   01/12/19 09:07


 


Pulse Rate  96 H  01/12/19 09:07


 


Respiratory Rate  20   01/12/19 09:07


 


Blood Pressure  138/69   01/12/19 09:07


 


O2 Sat by Pulse Oximetry (%)  96   01/12/19 09:17











Constitutional: Yes: No Distress


HENT: Yes: Atraumatic


Neck: Yes: Supple


Cardiovascular: Yes: Regular Rate and Rhythm


Respiratory: Yes: CTA Bilaterally


Gastrointestinal: Yes: Normal Bowel Sounds


Extremities: Yes: WNL


Edema: No


Peripheral Pulses WNL: Yes


Neurological: Yes: Alert, Oriented


Labs: 


 CBC, BMP





 01/12/19 07:00 





 01/11/19 05:30 





 INR, PTT











INR  1.11  (0.83-1.09)  H  01/09/19  12:47    














Problem List





- Problems


(1) Rectal bleeding


Assessment/Plan: 


no more bleeding pt said he had some early in morning but  not witnessed


stable


monitor h/h...transfuse prbc


po protonix


for colonoscopy possibly on monday


Code(s): K62.5 - HEMORRHAGE OF ANUS AND RECTUM   





(2) Benign essential HTN


Code(s): I10 - ESSENTIAL (PRIMARY) HYPERTENSION

## 2019-01-12 NOTE — PN
Progress Note (short form)





- Note


Progress Note: 





patient denied abdominal pain today.  Described rectal bleeding earlier this 

morning to nurse but none rest of day.  No wittnessed. Plan for EGD/COlon 1/14.

  Reenforced the need for complete bowel prep.  Surgical eval of significant b/

l inguinal hernias.  I eplxinaed to mr. murillo that the hernias may preclude 

completion of colonoscopy. 





 





Problem List





- Problems


(1) Rectal bleeding


Code(s): K62.5 - HEMORRHAGE OF ANUS AND RECTUM

## 2019-01-13 LAB
DEPRECATED RDW RBC AUTO: 12.8 % (ref 11.9–15.9)
HCT VFR BLD CALC: 18.7 % (ref 35.4–49)
HGB BLD-MCNC: 6.3 GM/DL (ref 11.7–16.9)
MCH RBC QN AUTO: 31.8 PG (ref 25.7–33.7)
MCHC RBC AUTO-ENTMCNC: 33.6 G/DL (ref 32–35.9)
MCV RBC: 94.7 FL (ref 80–96)
PLATELET # BLD AUTO: 339 K/MM3 (ref 134–434)
PMV BLD: 7.4 FL (ref 7.5–11.1)
RBC # BLD AUTO: 1.98 M/MM3 (ref 4–5.6)
WBC # BLD AUTO: 10.2 K/MM3 (ref 4–10)

## 2019-01-13 RX ADMIN — PANTOPRAZOLE SODIUM SCH MG: 40 TABLET, DELAYED RELEASE ORAL at 09:46

## 2019-01-13 RX ADMIN — CHLORHEXIDINE GLUCONATE SCH APPLIC: 213 SOLUTION TOPICAL at 21:54

## 2019-01-13 RX ADMIN — MUPIROCIN SCH: 20 OINTMENT TOPICAL at 21:54

## 2019-01-13 NOTE — PN
Progress Note, Physician


History of Present Illness: 


Denies recurrent hematochezia or abd pain, tolerating prep.





- Current Medication List


Current Medications: 


Active Medications





Pantoprazole Sodium (Protonix -)  40 mg PO DAILY ZENOBIA


   Last Admin: 01/13/19 09:46 Dose:  40 mg











- Objective


Vital Signs: 


 Vital Signs











Temperature  98.5 F   01/13/19 15:11


 


Pulse Rate  103 H  01/13/19 15:11


 


Respiratory Rate  20   01/13/19 15:11


 


Blood Pressure  108/57 L  01/13/19 15:11


 


O2 Sat by Pulse Oximetry (%)  97   01/13/19 10:00











Constitutional: Yes: No Distress, Calm


Neck: Yes: Supple


Cardiovascular: Yes: Regular Rate and Rhythm


Respiratory: Yes: Regular, CTA Bilaterally


Gastrointestinal: Yes: Soft, Hypoactive Bowel Sounds


Edema: No


Labs: 


 CBC, BMP





 01/12/19 07:00 





 01/11/19 05:30 





 INR, PTT











INR  1.11  (0.83-1.09)  H  01/09/19  12:47    














Problem List





- Problems


(1) Colitis


Code(s): K52.9 - NONINFECTIVE GASTROENTERITIS AND COLITIS, UNSPECIFIED   





(2) Inguinal hernia bilateral, non-recurrent


Code(s): K40.20 - BI INGUINAL HERNIA, W/O OBST OR GANGRENE, NOT SPCF AS RECUR   


Qualifiers: 


   Obstruction and gangrene presence: without obstruction or gangrene 





(3) Pre-procedural cardiovascular examination


Code(s): Z01.810 - ENCOUNTER FOR PREPROCEDURAL CARDIOVASCULAR EXAMINATION   





(4) Rectal bleeding


Code(s): K62.5 - HEMORRHAGE OF ANUS AND RECTUM   





(5) Anemia


Code(s): D64.9 - ANEMIA, UNSPECIFIED   


Qualifiers: 


   Other causes of anemia: acute posthemorrhagic 





Assessment/Plan


01/10/2019 Extensive thickening and inflammatory changes right colon c/w colitis

, large bilateral inguinal hernias





1. Hematochezia referable to right-sided colitis


2. Acute anemia


3. Bilateral inguinal hernias





P:1. Given absence of symptoms of acute coronary syndrome, decompensated CHF or 

malignant arrhythmia, may proceed with EGD/colonoscopy from CV-standpoint once 

colitis resolved


2. Clear diet, colon prep, transfuse to maintain Hgb>8.0


3. Monitor H/H and for signs of overt bleeding

## 2019-01-13 NOTE — CONSULT
Consult


Consult Specialty:: Pulm/CCM


Referred by:: Dr. Calzada


Reason for Consultation:: Bleeding per rectum





- History of Present Illness


Chief Complaint: rectal bleeding


History of Present Illness: 





59 yo M h/o HTN, HLD, DM, schizophrenia, hepatitis C initially admitted to  

for bleeding per rectum x 2 days. He's also found to have R sided colitis w/ 

bilateral inguinal hernias. Patient reported 2 episodes of bright red blood per 

rectum but unable to quantify the amount. He had similar episodes few months 

ago and underwent colonoscopy. He denies fever, chills, dizziness, chest pain, 

shortness of breath, n/v, abd pain.





- History Source


History Provided By: Patient


Limitations to Obtaining History: No Limitations





- Past Medical History


Cardio/Vascular: Yes: HTN, Hyperlipdemia


Hepatobiliary: Yes: Hepatitis C (pt does not know about)


Psych: Yes: Schizophrenia


Endocrine: Yes: Diabetes Mellitus





- Past Surgical History


Past Surgical History: Yes: None





- Alcohol/Substance Use


Hx Alcohol Use: Yes (pt states not for last year in [facility] program)


History of Substance Use: reports: Marijuana (not for last year per pt while in 

program)





- Smoking History


Smoking history: Current every day smoker


Have you smoked in the past 12 months: Yes


Aproximately how many cigarettes per day: 20





- Social History


ADL: Independent


Occupation: unemployed





Home Medications





- Allergies


Allergies/Adverse Reactions: 


 Allergies











Allergy/AdvReac Type Severity Reaction Status Date / Time


 


No Known Allergies Allergy   Verified 08/20/18 09:49














- Home Medications


Home Medications: 


Ambulatory Orders





ZyPREXA -  01/09/19 











Review of Systems





- Review of Systems


Constitutional: denies: Chills, Fever


Cardiovascular: denies: Chest Pain, Shortness of Breath


Respiratory: denies: Cough, SOB


Gastrointestinal: reports: Rectal Bleeding.  denies: Nausea, Vomiting





Physical Exam


Vital Signs: 


 Vital Signs











Temperature  98.3 F   01/13/19 18:30


 


Pulse Rate  105 H  01/13/19 18:30


 


Respiratory Rate  24 H  01/13/19 18:30


 


Blood Pressure  107/62   01/13/19 18:30


 


O2 Sat by Pulse Oximetry (%)  97   01/13/19 10:00











Constitutional: Yes: No Distress, Calm


Eyes: Yes: Conjunctiva Clear, EOM Intact


HENT: Yes: Atraumatic, Normocephalic


Cardiovascular: Yes: Regular Rate and Rhythm, S1, S2.  No: Murmur


Respiratory: Yes: CTA Bilaterally


Gastrointestinal: Yes: Normal Bowel Sounds, Abdomen, Obese, Hernia, Rectal 

Bleeding.  No: Tenderness, Vomiting


Edema: No


Neurological: Yes: Alert, Oriented


Labs: 


 CBC, BMP





 01/13/19 16:55 





 01/11/19 05:30 











Assessment/Plan





59 yo M h/o GI bleed admitted to ICU for observation. 





GI:





LGIB


1. prep for EGD/colonoscopy tomorrow


2. establish 2 18g or larger accesses


3. receiving 2 PRBCs


4. AM lab





b/l inguinal hernia


1. possible repair after colonoscopy


2. surgery onboard





Hep C


1. never treated


2. GI following





Neuro:





Schizophrenia


1. primary team to confirm zyprexa dose








Noe Castro PGY3


ICU resident











Visit type





- Emergency Visit


Emergency Visit: No





- New Patient


This patient is new to me today: Yes


Date on this admission: 01/15/19





- Critical Care


Critical Care patient: Yes


Total Critical Care Time (in minutes): 40


Critical Care Statement: The care of this patient involved high complexity 

decision making to prevent further life threatening deterioration of the patient

's condition and/or to evaluate & treat vital organ system(s) failure or risk 

of failure.

## 2019-01-13 NOTE — CONSULT
Consult


Consult Specialty:: General Surgery


Referred by:: Dr. Durand


Reason for Consultation:: bilateral inguinal hernias with colon content





- History of Present Illness


Chief Complaint: bilateral groin/lower abdominal pain, rectal bleeding


History of Present Illness: 





58yoM smoker with HTN, HLD, DM, hep C (per chart), schizophrenia, goes to [facility] for the last year or so for an alcohol/MJ outpatient program, was sent 

from there a few days ago for rectal bleeding. He has received blood this 

admission, and last Hb was 8.6 yesterday. EGD/colonoscopy was planned Friday, 

but he did not drink enough of the prep, and is also found to have large 

bilateral inguinal hernias with colonic content in both. Surgery is asked to 

evaluate the hernias. He is a poor historian, but answers questions. He notes 

that he had rectal bleeding but not diarrhea or constipation for a few days only

, and one episode about a month prior, but had not previously sought attention 

for it. He denies HA/dizziness/N/V/previous episodes before those noted. Denies 

urinary problems or recent illness. Denies history of surgery. Per chart, he 

has been noncompliant with home meds for most medical issues, but he does say 

he has been on Zyprexa for his schizophrenia, though he does not know dose or 

frequency. He is seen and examined in bed, starting Golytely prep. Encouraged 

to drink as much as quickly as possible, one cup at a time. Last BM was bloody, 

per nursing and patient. He describes some abdominal pain in bilateral lower 

abdomen/groins just recently (few days?), and does not know how long he has had 

the hernias - few weeks? few months? He would be interested in discussing 

repair at some point. He is concerned about being able to go home and continue 

his program.





- History Source


History Provided By: Patient, Medical Record


Limitations to Obtaining History: Poor Historian





- Past Medical History


Cardio/Vascular: Yes: HTN, Hyperlipdemia


Hepatobiliary: Yes: Hepatitis C (pt does not know about)


Psych: Yes: Schizophrenia


Endocrine: Yes: Diabetes Mellitus





- Past Surgical History


Past Surgical History: Yes: None





- Alcohol/Substance Use


Hx Alcohol Use: Yes (pt states not for last year in [facility] program)


History of Substance Use: reports: Marijuana (not for last year per pt while in 

program)





- Smoking History


Smoking history: Current every day smoker


Have you smoked in the past 12 months: Yes


Aproximately how many cigarettes per day: 20





- Social History


ADL: Independent


Occupation: unemployed





Home Medications





- Allergies


Allergies/Adverse Reactions: 


 Allergies











Allergy/AdvReac Type Severity Reaction Status Date / Time


 


No Known Allergies Allergy   Verified 08/20/18 09:49














- Home Medications


Home Medications: 


Ambulatory Orders





ZyPREXA -  01/09/19 








Home Medications (free text): pt does not know zyprexa dose nor frequency, but 

has been on it a long time





Family Disease History





- Family Disease History


Family History: Unable to Obtain





Review of Systems





- Review of Systems


Constitutional: denies: Chills, Fever


Eyes: denies: Blurred Vision, Recent Change in Vision


HENT: denies: Difficult Swallowing, Nasal Congestion, Throat Pain


Neck: denies: Swollen Glands, Tenderness


Cardiovascular: denies: Chest Pain, Palpitations


Respiratory: denies: Cough, SOB


Gastrointestinal: reports: Abdominal Pain (with hpi; bilateral lower/groin pain)

, Rectal Bleeding (with hpi).  denies: Constipation, Diarrhea, Nausea, Vomiting


Genitourinary: denies: Burning, Dysuria


Musculoskeletal: denies: Back Pain, Joint Pain, Muscle Pain


Integumentary: denies: Change in Color, Rash


Neurological: denies: Dizziness, Headache


Psychiatric: denies: Anxiety, Depression





Physical Exam


Vital Signs: 


 Vital Signs











Temperature  98.5 F   01/13/19 15:11


 


Pulse Rate  103 H  01/13/19 15:11


 


Respiratory Rate  20   01/13/19 15:11


 


Blood Pressure  108/57 L  01/13/19 15:11


 


O2 Sat by Pulse Oximetry (%)  97   01/13/19 10:00











Constitutional: Yes: Well Nourished, No Distress, Calm


Eyes: Yes: Conjunctiva Clear, EOM Intact


HENT: Yes: Atraumatic, Normocephalic


Neck: Yes: Supple, Trachea Midline


Cardiovascular: Yes: Tachycardia.  No: Pulse Irregular


Respiratory: Yes: Regular, CTA Bilaterally


Gastrointestinal: Yes: Normal Bowel Sounds, Soft, Abdomen, Obese, Hernia (

bilateral inguinal with bowel content, mostly reducible on both sides, not 

really tender except at internal rings once mostly reduced; most of bulges are 

above scrotum, not really in it), Other (no scars).  No: Tenderness


...Rectal Exam: Yes: Deferred


Renal/: No: CVA Tenderness - Left, CVA Tenderness - Right


Musculoskeletal: No: Joint Stiffness, Joint Swelling


Extremities: No: Cool, Cyanosis


Edema: No


Peripheral Pulses WNL: Yes


Integumentary: No: Jaundice, Rash


Neurological: Yes: Alert, Oriented


Psychiatric: Yes: Alert, Oriented


Labs: 


 CBC, BMP





 01/12/19 07:00 





 01/11/19 05:30 





 CMP











Sodium  139 mmol/L (136-145)   01/11/19  05:30    


 


Potassium  3.9 mmol/L (3.5-5.1)   01/11/19  05:30    


 


Chloride  108 mmol/L ()  H  01/11/19  05:30    


 


Carbon Dioxide  30 mmol/L (21-32)   01/11/19  05:30    


 


Anion Gap  2 MMOL/L (8-16)  L  01/11/19  05:30    


 


BUN  6 mg/dL (7-18)  L  01/11/19  05:30    


 


Creatinine  1.1 mg/dL (0.55-1.3)   01/11/19  05:30    


 


Creat Clearance w eGFR  > 60  (>60)   01/11/19  05:30    


 


Random Glucose  137 mg/dL ()  H  01/11/19  05:30    


 


Calcium  8.0 mg/dL (8.5-10.1)  L  01/11/19  05:30    


 


Total Bilirubin  0.2 mg/dL (0.2-1)   01/10/19  06:22    


 


AST  7 U/L (15-37)  L  01/10/19  06:22    


 


ALT  9 U/L (13-61)  L  01/10/19  06:22    


 


Alkaline Phosphatase  43 U/L ()  L  01/10/19  06:22    


 


Creatine Kinase  110 IU/L ()   01/09/19  12:47    


 


Troponin I  0.15 ng/ml (0.00-0.05)  H  01/09/19  12:47    


 


B-Natriuretic Peptide  984.2 pg/ml (5-125)  H  01/09/19  12:47    


 


Total Protein  5.5 g/dl (6.4-8.2)  L  01/10/19  06:22    


 


Albumin  2.4 g/dl (3.4-5.0)  L  01/10/19  06:22    








 INR, PTT











INR  1.11  (0.83-1.09)  H  01/09/19  12:47    








 Urine Test Results











Urine Color  Yellow   01/11/19  17:15    


 


Urine Appearance  Clear   01/11/19  17:15    


 


Urine pH  5.0  (5.0-8.0)   01/11/19  17:15    


 


Ur Specific Gravity  1.022  (1.010-1.035)   01/11/19  17:15    


 


Urine Protein  1+  (NEGATIVE)  H  01/11/19  17:15    


 


Urine Glucose (UA)  3+  (NEGATIVE)  H  01/11/19  17:15    


 


Urine Ketones  Negative  (NEGATIVE)   01/11/19  17:15    


 


Urine Blood  Negative  (NEGATIVE)   01/11/19  17:15    


 


Urine Nitrite  Negative  (NEGATIVE)   01/11/19  17:15    


 


Urine Bilirubin  Negative  (<2.0 mg/dL)   01/11/19  17:15    


 


Ur Leukocyte Esterase  Negative  (NEGATIVE)   01/11/19  17:15    


 


Ur Epithelial Cells  Rare /HPF (FEW)   01/11/19  17:15    


 


Urine Mucus  Few   01/11/19  17:15    














Imaging





- Results


Cat Scan: Report Reviewed, Image Reviewed (images personally reviewed - 

bilateral inguinal hernias with sigmoid on left, cecum and portion of colon on 

right; either right colon or proximal transverse with significant segment of 

wall thickening, suspicious for mass vs colitis; no obstruction, contrast 

throughout colon)





Problem List





- Problems


(1) Rectal bleeding


Assessment/Plan: 


trend H/H


keep T&S up to date


s/p transfusion


+ blood per rectum today per nursing, pt


plan for EGD/colonoscopy tomorrow pending adequate prep


can assist with reduction of hernias for procedure


CT suspicious for colonic mass vs colitis, especially given bleeding


Code(s): K62.5 - HEMORRHAGE OF ANUS AND RECTUM   





(2) Anemia


Code(s): D64.9 - ANEMIA, UNSPECIFIED   


Qualifiers: 


   Anemia type: other cause   Other causes of anemia: acute posthemorrhagic   

Qualified Code(s): D62 - Acute posthemorrhagic anemia   





(3) Inguinal hernia bilateral, non-recurrent


Assessment/Plan: 


mostly reducible bilaterally, difficult to tell if completely reduced, as 

internal rings are both quite large


content not really into scrotal sac, mostly above on both sides, may be direct 

vs indirect


pending results of colonoscopy, if completable, can discuss possibility of 

repair if desired - if colon with mass, cannot use mesh at same operation as 

colon resection for hernia repair


will f/u with GI in am


Code(s): K40.20 - BI INGUINAL HERNIA, W/O OBST OR GANGRENE, NOT SPCF AS RECUR   


Qualifiers: 


   Obstruction and gangrene presence: without obstruction or gangrene   

Recurrence: non-recurrent   Qualified Code(s): K40.20 - Bilateral inguinal 

hernia, without obstruction or gangrene, not specified as recurrent   





(4) Hypertension


Code(s): I10 - ESSENTIAL (PRIMARY) HYPERTENSION   


Qualifiers: 


   Hypertension type: essential hypertension   Qualified Code(s): I10 - 

Essential (primary) hypertension   





(5) Schizophrenia


Assessment/Plan: 


pt does not know home dose but is on zyprexa - consider checking with [facility]

? unclear if they would know... no notes visible in [facility]


given outpatient program for EtOH/MJ abuse - consider psych consult or [facility] liaison to see?


Code(s): F20.9 - SCHIZOPHRENIA, UNSPECIFIED   


Qualifiers: 


   Schizophrenia type: unspecified   Qualified Code(s): F20.9 - Schizophrenia, 

unspecified

## 2019-01-13 NOTE — PN
Progress Note, Physician





- Current Medication List


Current Medications: 


Active Medications





Chlorhexidine Gluconate (Hibiclens For Decolonization -)  1 applic TP HS ZENOBIA


Mupirocin (Bactroban Ointment (For Decolonization) -)  1 applic NS BID [facility]


   Stop: 01/18/19 21:59


Pantoprazole Sodium (Protonix -)  40 mg PO DAILY [facility]


   Last Admin: 01/13/19 09:46 Dose:  40 mg











- Objective


Vital Signs: 


 Vital Signs











Temperature  98.3 F   01/13/19 18:30


 


Pulse Rate  105 H  01/13/19 18:30


 


Respiratory Rate  24 H  01/13/19 18:30


 


Blood Pressure  107/62   01/13/19 18:30


 


O2 Sat by Pulse Oximetry (%)  97   01/13/19 10:00











Labs: 


 CBC, BMP





 01/13/19 16:55 





 01/11/19 05:30 





 INR, PTT











INR  1.11  (0.83-1.09)  H  01/09/19  12:47

## 2019-01-14 LAB
ANION GAP SERPL CALC-SCNC: 6 MMOL/L (ref 8–16)
APTT BLD: 30.8 SECONDS (ref 25.2–36.5)
BASOPHILS # BLD: 0.2 % (ref 0–2)
BASOPHILS # BLD: 0.3 % (ref 0–2)
BUN SERPL-MCNC: 9 MG/DL (ref 7–18)
CALCIUM SERPL-MCNC: 7.2 MG/DL (ref 8.5–10.1)
CHLORIDE SERPL-SCNC: 111 MMOL/L (ref 98–107)
CO2 SERPL-SCNC: 26 MMOL/L (ref 21–32)
CREAT SERPL-MCNC: 1.3 MG/DL (ref 0.55–1.3)
DEPRECATED RDW RBC AUTO: 16.1 % (ref 11.9–15.9)
DEPRECATED RDW RBC AUTO: 16.5 % (ref 11.9–15.9)
DEPRECATED RDW RBC AUTO: 16.8 % (ref 11.9–15.9)
DEPRECATED RDW RBC AUTO: 17 % (ref 11.9–15.9)
EOSINOPHIL # BLD: 0.4 % (ref 0–4.5)
EOSINOPHIL # BLD: 0.5 % (ref 0–4.5)
GLUCOSE SERPL-MCNC: 150 MG/DL (ref 74–106)
HCT VFR BLD CALC: 17.4 % (ref 35.4–49)
HCT VFR BLD CALC: 22.5 % (ref 35.4–49)
HCT VFR BLD CALC: 22.6 % (ref 35.4–49)
HCT VFR BLD CALC: 24.2 % (ref 35.4–49)
HGB BLD-MCNC: 5.9 GM/DL (ref 11.7–16.9)
HGB BLD-MCNC: 7.3 GM/DL (ref 11.7–16.9)
HGB BLD-MCNC: 7.9 GM/DL (ref 11.7–16.9)
HGB BLD-MCNC: 8.4 GM/DL (ref 11.7–16.9)
INR BLD: 1.17 (ref 0.83–1.09)
LYMPHOCYTES # BLD: 10.3 % (ref 8–40)
LYMPHOCYTES # BLD: 17.9 % (ref 8–40)
MAGNESIUM SERPL-MCNC: 1.9 MG/DL (ref 1.8–2.4)
MCH RBC QN AUTO: 28.9 PG (ref 25.7–33.7)
MCH RBC QN AUTO: 30 PG (ref 25.7–33.7)
MCH RBC QN AUTO: 30.1 PG (ref 25.7–33.7)
MCH RBC QN AUTO: 30.1 PG (ref 25.7–33.7)
MCHC RBC AUTO-ENTMCNC: 32.5 G/DL (ref 32–35.9)
MCHC RBC AUTO-ENTMCNC: 33.7 G/DL (ref 32–35.9)
MCHC RBC AUTO-ENTMCNC: 34.7 G/DL (ref 32–35.9)
MCHC RBC AUTO-ENTMCNC: 34.9 G/DL (ref 32–35.9)
MCV RBC: 86.1 FL (ref 80–96)
MCV RBC: 86.8 FL (ref 80–96)
MCV RBC: 89.1 FL (ref 80–96)
MCV RBC: 89.4 FL (ref 80–96)
MONOCYTES # BLD AUTO: 5.4 % (ref 3.8–10.2)
MONOCYTES # BLD AUTO: 7.3 % (ref 3.8–10.2)
NEUTROPHILS # BLD: 74.1 % (ref 42.8–82.8)
NEUTROPHILS # BLD: 83.6 % (ref 42.8–82.8)
PHOSPHATE SERPL-MCNC: 3.8 MG/DL (ref 2.5–4.9)
PLATELET # BLD AUTO: 229 K/MM3 (ref 134–434)
PLATELET # BLD AUTO: 260 K/MM3 (ref 134–434)
PLATELET # BLD AUTO: 278 K/MM3 (ref 134–434)
PLATELET # BLD AUTO: 299 K/MM3 (ref 134–434)
PMV BLD: 7.5 FL (ref 7.5–11.1)
PMV BLD: 7.5 FL (ref 7.5–11.1)
PMV BLD: 7.6 FL (ref 7.5–11.1)
PMV BLD: 7.9 FL (ref 7.5–11.1)
POTASSIUM SERPLBLD-SCNC: 4.3 MMOL/L (ref 3.5–5.1)
PT PNL PPP: 13.8 SEC (ref 9.7–13)
RBC # BLD AUTO: 1.95 M/MM3 (ref 4–5.6)
RBC # BLD AUTO: 2.52 M/MM3 (ref 4–5.6)
RBC # BLD AUTO: 2.62 M/MM3 (ref 4–5.6)
RBC # BLD AUTO: 2.78 M/MM3 (ref 4–5.6)
SODIUM SERPL-SCNC: 142 MMOL/L (ref 136–145)
WBC # BLD AUTO: 13.2 K/MM3 (ref 4–10)
WBC # BLD AUTO: 13.5 K/MM3 (ref 4–10)
WBC # BLD AUTO: 13.5 K/MM3 (ref 4–10)
WBC # BLD AUTO: 14.3 K/MM3 (ref 4–10)

## 2019-01-14 PROCEDURE — 0DJD8ZZ INSPECTION OF LOWER INTESTINAL TRACT, VIA NATURAL OR ARTIFICIAL OPENING ENDOSCOPIC: ICD-10-PCS | Performed by: STUDENT IN AN ORGANIZED HEALTH CARE EDUCATION/TRAINING PROGRAM

## 2019-01-14 RX ADMIN — PANTOPRAZOLE SODIUM SCH MG: 40 TABLET, DELAYED RELEASE ORAL at 09:54

## 2019-01-14 RX ADMIN — CHLORHEXIDINE GLUCONATE SCH APPLIC: 213 SOLUTION TOPICAL at 22:00

## 2019-01-14 RX ADMIN — MUPIROCIN SCH APPLIC: 20 OINTMENT TOPICAL at 10:00

## 2019-01-14 NOTE — PN
GI Progress Note


Subjective: 





Events noted. Patient with recurrent bleeding again, more profuse this evening 

after bowel prep.Hgb 6.3 4:30PM yesterday.  I transferred him to the ICU, where 

he received 2 U PRBC w/ repeat Hgb 5.9 post transfusion. He received fluid 

boluses and is currently receiving 3rd unit PRBC.  scheduled for 1 more unit. 

Currently, he is awake, without focal complaints.  Denies abdominal pain. Did 

not complete golytely prep despite starting yesterday afternoon.     





- Objective


Vital Signs: 


 Vital Signs











Temperature  99.1 F   01/14/19 01:20


 


Pulse Rate  102 H  01/14/19 01:20


 


Respiratory Rate  15   01/14/19 01:20


 


Blood Pressure  111/66   01/14/19 01:20


 


O2 Sat by Pulse Oximetry (%)  90 L  01/13/19 22:00











Constitutional: Calm


Eyes: No: Sclera Icterus


Cardiovascular: Yes: Tachycardia


Respiratory: Yes: CTA Bilaterally


Gastrointestinal Inspection: No: Distention


...Auscultate: Yes: Normoactive Bowel Sounds


...Palpate: No: Tenderness


...Percussion: No: Tympanitic


Edema: Yes


Edema: LLE: 1+, RLE: 1+


Neurological: Yes: Alert (awake)


Labs: 


 CBC, BMP





 01/14/19 00:15 





 01/11/19 05:30 





 INR, PTT











INR  1.11  (0.83-1.09)  H  01/09/19  12:47    








 Hepatic Panel











Total Bilirubin  0.2 mg/dL (0.2-1)   01/10/19  06:22    


 


AST  7 U/L (15-37)  L  01/10/19  06:22    


 


ALT  9 U/L (13-61)  L  01/10/19  06:22    


 


Alkaline Phosphatase  43 U/L ()  L  01/10/19  06:22    


 


Albumin  2.4 g/dl (3.4-5.0)  L  01/10/19  06:22    














- ....Imaging


Cat Scan: Report Reviewed, Image Reviewed





Problem List





- Problems


(1) Rectal bleeding


Assessment/Plan: 


Suspected recurrent LGIB. Right sided colitis with involvement of proximal 

transverse noted on initial CT scan however pain cowart, Mr. Huang has been 

asymptomatic since admission.  He did describe pain days prior to admission. ? 

tail end of a right sided ischemic colitis. ? mass lesion in the right colon  

Divertuiculosis noted throughout colon as well. Diverticular bleed high in 

differential. Plan for colonoscopy in AM.The BUN being so low not reflective of 

an upper GI source, however, EGD will be performed as well.  


If profuse bleeding persists, CTA of the abdomen and pelvis with and without 

contrast to help localize bleeding source.


Continue resuscitation


Surgery is following  


Discussed plan with ICU resident





Code(s): K62.5 - HEMORRHAGE OF ANUS AND RECTUM

## 2019-01-14 NOTE — EKG
Test Reason : 

Blood Pressure : ***/*** mmHG

Vent. Rate : 084 BPM     Atrial Rate : 084 BPM

   P-R Int : 162 ms          QRS Dur : 082 ms

    QT Int : 406 ms       P-R-T Axes : 052 015 182 degrees

   QTc Int : 479 ms

 

NORMAL SINUS RHYTHM

T WAVE ABNORMALITY, CONSIDER INFERIOR ISCHEMIA

T WAVE ABNORMALITY, CONSIDER ANTEROLATERAL ISCHEMIA

PROLONGED QT

ABNORMAL ECG

WHEN COMPARED WITH ECG OF 09-JAN-2019 12:54,

T WAVE INVERSION IS NOTED IN LEAD V3

Confirmed by KOSTAS LEAL MD ([id number]) on 1/14/2019 9:54:22 AM

 

Referred By: ALEN TANNER DR           Confirmed By:KOSTAS LEAL MD

## 2019-01-14 NOTE — PN
Progress Note, Physician


History of Present Illness: 





had colonoscopy..report reviewed


stable





- Current Medication List


Current Medications: 


Active Medications





Chlorhexidine Gluconate (Hibiclens For Decolonization -)  1 applic TP HS [facility]


   Last Admin: 01/13/19 21:54 Dose:  1 applic


Sodium Chloride (Normal Saline -)  1,000 mls @ 1,000 mls/hr IV ASDIR PRN


   PRN Reason: Give the bolus if MAP < 65


   Stop: 01/15/19 03:14


Metronidazole (Flagyl 500mg Premixed Ivpb -)  500 mg in 100 mls @ 100 mls/hr 

IVPB Q8H-IV [facility]


   Last Admin: 01/14/19 09:53 Dose:  100 mls/hr


Levofloxacin (Levaquin 750 Mg Premixed Ivpb -)  750 mg in 150 mls @ 100 mls/hr 

IVPB DAILY [facility]; Protocol


   Last Admin: 01/14/19 04:29 Dose:  100 mls/hr


Mupirocin (Bactroban Ointment (For Decolonization) -)  1 applic NS BID [facility]


   Stop: 01/18/19 21:59


   Last Admin: 01/14/19 10:00 Dose:  1 applic


Pantoprazole Sodium (Protonix -)  40 mg PO DAILY [facility]


   Last Admin: 01/14/19 09:54 Dose:  40 mg











- Objective


Vital Signs: 


 Vital Signs











Temperature  99.4 F   01/14/19 10:00


 


Pulse Rate  87   01/14/19 11:45


 


Respiratory Rate  21 H  01/14/19 11:45


 


Blood Pressure  112/65   01/14/19 11:45


 


O2 Sat by Pulse Oximetry (%)  90 L  01/13/19 22:00











Constitutional: Yes: Calm


HENT: Yes: Atraumatic


Neck: Yes: Supple


Cardiovascular: Yes: Regular Rate and Rhythm


Respiratory: Yes: CTA Bilaterally


Gastrointestinal: Yes: Normal Bowel Sounds


Extremities: Yes: WNL


Edema: No


Peripheral Pulses WNL: Yes


Neurological: Yes: Alert, Oriented


Labs: 


 CBC, BMP





 01/14/19 05:15 





 01/14/19 05:15 





 INR, PTT











INR  1.17  (0.83-1.09)  H  01/14/19  05:15    














Problem List





- Problems


(1) Rectal bleeding


Assessment/Plan: 


no bleeding site seen in colonoscopy


on clear liquid diet


if all goes well will advance diet


for abd/pelvis cta


Code(s): K62.5 - HEMORRHAGE OF ANUS AND RECTUM   





(2) Benign essential HTN


Code(s): I10 - ESSENTIAL (PRIMARY) HYPERTENSION   





(3) Anemia


Code(s): D64.9 - ANEMIA, UNSPECIFIED   


Qualifiers: 


   Anemia type: other cause   Other causes of anemia: acute posthemorrhagic   

Qualified Code(s): D62 - Acute posthemorrhagic anemia   





(4) Colitis


Code(s): K52.9 - NONINFECTIVE GASTROENTERITIS AND COLITIS, UNSPECIFIED   





(5) Inguinal hernia bilateral, non-recurrent


Assessment/Plan: 


d/w wit dr silva for surgical options, she will talk to other surgeons and let 

us know


Code(s): K40.20 - BI INGUINAL HERNIA, W/O OBST OR GANGRENE, NOT SPCF AS RECUR   


Qualifiers: 


   Obstruction and gangrene presence: without obstruction or gangrene   

Recurrence: non-recurrent   Qualified Code(s): K40.20 - Bilateral inguinal 

hernia, without obstruction or gangrene, not specified as recurrent   





(6) Schizophrenia


Assessment/Plan: 


will check about psych meds


Code(s): F20.9 - SCHIZOPHRENIA, UNSPECIFIED   


Qualifiers: 


   Schizophrenia type: unspecified   Qualified Code(s): F20.9 - Schizophrenia, 

unspecified   





(7) Hypertension


Assessment/Plan: 


monitor bp


Code(s): I10 - ESSENTIAL (PRIMARY) HYPERTENSION   


Qualifiers: 


   Hypertension type: essential hypertension   Qualified Code(s): I10 - 

Essential (primary) hypertension   





Assessment/Plan





cc time 35 min

## 2019-01-14 NOTE — PN
Teaching Attending Note


Name of Resident: Satinder Hughes





ATTENDING PHYSICIAN STATEMENT





I saw and evaluated the patient.


I reviewed the resident's note and discussed the case with the resident.


I agree with the resident's findings and plan as documented.








SUBJECTIVE:


Patient seen and examined in the ICU.  Awake and alert. 


Receiving additional pRBC transfusion as his H&H did not appropriately rise 

with previous transfusions. 





For Endoscopic evaluation by GI today. 





 Intake & Output











 01/11/19 01/12/19 01/13/19 01/14/19





 23:59 23:59 23:59 23:59


 


Intake Total 670 1540 4750 1010


 


Output Total    650


 


Balance 670 1540 4750 360


 


Weight    235 lb








 Last Vital Signs











Temp Pulse Resp BP Pulse Ox


 


 99.4 F   87   21 H  112/65   90 L


 


 01/14/19 10:00  01/14/19 11:45  01/14/19 11:45  01/14/19 11:45  01/13/19 22:00








Active Medications





Chlorhexidine Gluconate (Hibiclens For Decolonization -)  1 applic TP HS [facility]


   Last Admin: 01/13/19 21:54 Dose:  1 applic


Sodium Chloride (Normal Saline -)  1,000 mls @ 1,000 mls/hr IV ASDIR PRN


   PRN Reason: Give the bolus if MAP < 65


   Stop: 01/15/19 03:14


Metronidazole (Flagyl 500mg Premixed Ivpb -)  500 mg in 100 mls @ 100 mls/hr 

IVPB Q8H-IV [facility]


   Last Admin: 01/14/19 09:53 Dose:  100 mls/hr


Levofloxacin (Levaquin 750 Mg Premixed Ivpb -)  750 mg in 150 mls @ 100 mls/hr 

IVPB DAILY [facility]; Protocol


   Last Admin: 01/14/19 04:29 Dose:  100 mls/hr


Mupirocin (Bactroban Ointment (For Decolonization) -)  1 applic NS BID [facility]


   Stop: 01/18/19 21:59


   Last Admin: 01/14/19 10:00 Dose:  1 applic


Pantoprazole Sodium (Protonix -)  40 mg PO DAILY [facility]


   Last Admin: 01/14/19 09:54 Dose:  40 mg











Constitutional: Yes: No Distress, Calm


Eyes: Yes: Conjunctiva Clear, EOM Intact


HENT: Yes: Atraumatic, Normocephalic


Cardiovascular: Yes: Regular Rate and Rhythm, S1, S2.  No: Murmur


Respiratory: Yes: CTA Bilaterally


Gastrointestinal: Yes: Normal Bowel Sounds, Abdomen, Obese, Hernia, Rectal 

Bleeding.  No: Tenderness, Vomiting


Edema: No


Neurological: Yes: Alert, Oriented


Labs: 


 Laboratory Results - last 24 hr











  01/09/19 01/13/19 01/13/19





  12:47 16:55 16:55


 


WBC   10.2 H 


 


RBC   1.98 L 


 


Hgb   6.3 L* 


 


Hct   18.7 L D 


 


MCV   94.7 


 


MCH   31.8 


 


MCHC   33.6 


 


RDW   12.8 


 


Plt Count   339 


 


MPV   7.4 L D 


 


Absolute Neuts (auto)   


 


Neutrophils %   


 


Lymphocytes %   


 


Monocytes %   


 


Eosinophils %   


 


Basophils %   


 


Nucleated RBC %   


 


PT with INR   


 


INR   


 


PTT (Actin FS)   


 


Sodium   


 


Potassium   


 


Chloride   


 


Carbon Dioxide   


 


Anion Gap   


 


BUN   


 


Creatinine   


 


Creat Clearance w eGFR   


 


Random Glucose   


 


Calcium   


 


Phosphorus   


 


Magnesium   


 


Anti-A Titer    Cancelled


 


Blood Type  O POSITIVE   Cancelled


 


Antibody Screen  Negative  


 


Crossmatch  See Detail  














  01/13/19 01/14/19 01/14/19





  16:55 00:15 05:15


 


WBC   13.2 H  13.5 H


 


RBC   1.95 L  2.52 L


 


Hgb   5.9 L*  7.3 L


 


Hct   17.4 L  22.5 L D


 


MCV   89.4  89.1


 


MCH   30.1  28.9


 


MCHC   33.7  32.5


 


RDW   16.1 H  16.5 H


 


Plt Count   260  D  229


 


MPV   7.9  7.5


 


Absolute Neuts (auto)   11.0 H  10.0 H


 


Neutrophils %   83.6 H D  74.1


 


Lymphocytes %   10.3  D  17.9  D


 


Monocytes %   5.4  7.3


 


Eosinophils %   0.4  0.5


 


Basophils %   0.3  0.2


 


Nucleated RBC %   0  0


 


PT with INR   


 


INR   


 


PTT (Actin FS)   


 


Sodium   


 


Potassium   


 


Chloride   


 


Carbon Dioxide   


 


Anion Gap   


 


BUN   


 


Creatinine   


 


Creat Clearance w eGFR   


 


Random Glucose   


 


Calcium   


 


Phosphorus   


 


Magnesium   


 


Anti-A Titer   


 


Blood Type  O POSITIVE  


 


Antibody Screen  Negative  


 


Crossmatch  See Detail  














  01/14/19 01/14/19 01/14/19





  05:15 05:15 05:15


 


WBC   


 


RBC   


 


Hgb   


 


Hct   


 


MCV   


 


MCH   


 


MCHC   


 


RDW   


 


Plt Count   


 


MPV   


 


Absolute Neuts (auto)   


 


Neutrophils %   


 


Lymphocytes %   


 


Monocytes %   


 


Eosinophils %   


 


Basophils %   


 


Nucleated RBC %   


 


PT with INR   13.80 H 


 


INR   1.17 H 


 


PTT (Actin FS)   30.8 


 


Sodium  142  


 


Potassium  4.3  


 


Chloride  111 H  


 


Carbon Dioxide  26  


 


Anion Gap  6 L  


 


BUN  9  


 


Creatinine  1.3  


 


Creat Clearance w eGFR  57.10  


 


Random Glucose  150 H  


 


Calcium  7.2 L  


 


Phosphorus  3.8  


 


Magnesium  1.9  


 


Anti-A Titer   


 


Blood Type    O POSITIVE


 


Antibody Screen    Negative


 


Crossmatch    See Detail














Assessment/Plan


Suspected LGIB 


(?) colitis 


Bilateral inguinal hernias 


Hepatitis C 


Schizophrenia 





Normal transfusion thresholds Hgb: 7 


Serial H & H 


O2 as needed


Follow I & O 


Surgical evaluation noted 


Noted empiric ABX 


Mechanical VTE prophylaxis 


Maintain 2 large bore IV access 


ICU monitoring 





Dr Samuels 


Critical care time spent in reviewing chart, evaluating patient and formulating 

plan - 36 minutes.

## 2019-01-14 NOTE — PN
Progress Note, Physician


History of Present Illness: 


Pt with LGIB and CT showing R colon mass or colitis, with bilateral inguinal 

hernias containing colon. Transferred to ICU last night for continued bleeding 

from rectum with Hb drop - got 2 units PRBC 6.3 -> 5.9, then 2 more units, now 

7.3 this morning. No BMs since last night per patient. He is seen and examined 

in ICU, states he is feeling better. No dizziness, no abdominal pain. HR down 

in 80s. He had finished at least half of the Golytely prep. Seen by GI overnight

, plan is for scopes this morning. 





- Current Medication List


Current Medications: 


Active Medications





Chlorhexidine Gluconate (Hibiclens For Decolonization -)  1 applic TP HS [facility]


   Last Admin: 01/13/19 21:54 Dose:  1 applic


Sodium Chloride (Normal Saline -)  1,000 mls @ 1,000 mls/hr IV ASDIR PRN


   PRN Reason: Give the bolus if MAP < 65


   Stop: 01/15/19 03:14


Metronidazole (Flagyl 500mg Premixed Ivpb -)  500 mg in 100 mls @ 100 mls/hr 

IVPB Q8H-IV [facility]


   Last Admin: 01/14/19 03:27 Dose:  100 mls/hr


Levofloxacin (Levaquin 750 Mg Premixed Ivpb -)  750 mg in 150 mls @ 100 mls/hr 

IVPB DAILY [facility]; Protocol


   Last Admin: 01/14/19 04:29 Dose:  100 mls/hr


Mupirocin (Bactroban Ointment (For Decolonization) -)  1 applic NS BID [facility]


   Stop: 01/18/19 21:59


   Last Admin: 01/13/19 21:54 Dose:  Not Given


Pantoprazole Sodium (Protonix -)  40 mg PO DAILY [facility]


   Last Admin: 01/13/19 09:46 Dose:  40 mg











- Objective


Vital Signs: 


 Vital Signs











Temperature  98.7 F   01/14/19 06:00


 


Pulse Rate  92 H  01/14/19 06:00


 


Respiratory Rate  13   01/14/19 06:00


 


Blood Pressure  122/65   01/14/19 06:00


 


O2 Sat by Pulse Oximetry (%)  90 L  01/13/19 22:00








current HR 84


Constitutional: Yes: Well Nourished, No Distress, Calm


Eyes: Yes: Conjunctiva Clear, EOM Intact


HENT: Yes: Atraumatic, Normocephalic


Cardiovascular: Yes: Regular Rate and Rhythm


Gastrointestinal: Yes: Soft, Abdomen, Obese, Hernia (bilateral inguinal with 

bowel content, both sides mostly reducible, tender at internal rings a little).

  No: Tenderness


...Rectal Exam: Yes: Deferred


Extremities: No: Cool, Cyanosis


Integumentary: No: Jaundice, Rash


Neurological: Yes: Alert, Oriented


Labs: 


 CBC, BMP





 01/14/19 05:15 





 01/14/19 05:15 





 INR, PTT











INR  1.17  (0.83-1.09)  H  01/14/19  05:15    














Problem List





- Problems


(1) Rectal bleeding


Assessment/Plan: 


CT suspicious for colonic mass vs colitis, especially given bleeding


pt appears stable, tachycardia improved, BP ok


trend H/H


keep T&S up to date


s/p transfusion 5 units total PRBC


no BMs since last night, though yesterday's were bloody


plan for EGD/colonoscopy this morning


can assist with reduction of hernias for procedure - please call when pt goes 

to endo


This patient is critically ill. Time spent reviewing chart, examining patient, 

talking with providers and/or family and documentation is 35 minutes.


Code(s): K62.5 - HEMORRHAGE OF ANUS AND RECTUM   





(2) Anemia


Code(s): D64.9 - ANEMIA, UNSPECIFIED   


Qualifiers: 


   Anemia type: other cause   Other causes of anemia: acute posthemorrhagic   

Qualified Code(s): D62 - Acute posthemorrhagic anemia   





(3) Inguinal hernia bilateral, non-recurrent


Assessment/Plan: 


mostly reducible bilaterally, difficult to tell if completely reduced, as 

internal rings are both quite large


content not really into scrotal sac, mostly above on both sides, may be direct 

vs indirect


pending results of colonoscopy, if completable, can discuss possibility of 

repair if desired - if colon with mass, cannot use mesh at same operation as 

colon resection for hernia repair





Code(s): K40.20 - BI INGUINAL HERNIA, W/O OBST OR GANGRENE, NOT SPCF AS RECUR   


Qualifiers: 


   Obstruction and gangrene presence: without obstruction or gangrene   

Recurrence: non-recurrent   Qualified Code(s): K40.20 - Bilateral inguinal 

hernia, without obstruction or gangrene, not specified as recurrent   





(4) Hypertension


Code(s): I10 - ESSENTIAL (PRIMARY) HYPERTENSION   


Qualifiers: 


   Hypertension type: essential hypertension   Qualified Code(s): I10 - 

Essential (primary) hypertension   





(5) Schizophrenia


Assessment/Plan: 


pt does not know home dose but is on zyprexa - consider checking with [facility]

? unclear if they would know... no notes visible in [facility]


given outpatient program for EtOH/MJ abuse - consider psych consult or [facility] liaison to see?


Code(s): F20.9 - SCHIZOPHRENIA, UNSPECIFIED   


Qualifiers: 


   Schizophrenia type: unspecified   Qualified Code(s): F20.9 - Schizophrenia, 

unspecified

## 2019-01-14 NOTE — PN
Physical Exam: 











ICU 


SUBJECTIVE: Patient seen and examined at bed side , no active events over night 


denies any fever, chills, N/V/D/C , no BM over night , denies any bloodyBM , 

reports feeling lightheadedness when he get out of bed , pt recieved 4 units of 

blood with HGb 7.6 this AM . NPO since  mid night for EGD/Colonoscopy , 








OBJECTIVE:





 Vital Signs











 Period  Temp  Pulse  Resp  BP Sys/Dinero  Pulse Ox


 


 Last 24 Hr  98.3 F-100.3 F    12-25  /40-73  90











GENERAL: AAOx3 in AND 


HEAD: NC/AT 


EYES: PERRL, EOMI,


ENT: MMM 


NECK: Trachea midline, full range of motion, supple. 


LUNGS: Breath sounds equal, clear to auscultation bilaterally, no wheezes, no 

crackles, no 


accessory muscle use. 


HEART: Regular rate and rhythm, S1, S2 without murmur, rub or gallop.


ABDOMEN: Soft, nontender, nondistended, normoactive bowel sounds, no guarding, 

no 


rebound, 


EXTREMITIES: 2+ pulses, warm, well-perfused, no edema. 


NEUROLOGICAL: no focal deficit . Normal speech, gait not 


observed.


PSYCH: Normal mood, normal affect.


SKIN: Warm, dry, normal turgor, no rashes or lesions noted














 Laboratory Results - last 24 hr











  01/09/19 01/13/19 01/13/19





  12:47 16:55 16:55


 


WBC   10.2 H 


 


RBC   1.98 L 


 


Hgb   6.3 L* 


 


Hct   18.7 L D 


 


MCV   94.7 


 


MCH   31.8 


 


MCHC   33.6 


 


RDW   12.8 


 


Plt Count   339 


 


MPV   7.4 L D 


 


Absolute Neuts (auto)   


 


Neutrophils %   


 


Lymphocytes %   


 


Monocytes %   


 


Eosinophils %   


 


Basophils %   


 


Nucleated RBC %   


 


PT with INR   


 


INR   


 


PTT (Actin FS)   


 


Sodium   


 


Potassium   


 


Chloride   


 


Carbon Dioxide   


 


Anion Gap   


 


BUN   


 


Creatinine   


 


Creat Clearance w eGFR   


 


Random Glucose   


 


Calcium   


 


Phosphorus   


 


Magnesium   


 


Anti-A Titer    Cancelled


 


Blood Type  O POSITIVE   Cancelled


 


Antibody Screen  Negative  


 


Crossmatch  See Detail  














  01/13/19 01/14/19 01/14/19





  16:55 00:15 05:15


 


WBC   13.2 H  13.5 H


 


RBC   1.95 L  2.52 L


 


Hgb   5.9 L*  7.3 L


 


Hct   17.4 L  22.5 L D


 


MCV   89.4  89.1


 


MCH   30.1  28.9


 


MCHC   33.7  32.5


 


RDW   16.1 H  16.5 H


 


Plt Count   260  D  229


 


MPV   7.9  7.5


 


Absolute Neuts (auto)   11.0 H  10.0 H


 


Neutrophils %   83.6 H D  74.1


 


Lymphocytes %   10.3  D  17.9  D


 


Monocytes %   5.4  7.3


 


Eosinophils %   0.4  0.5


 


Basophils %   0.3  0.2


 


Nucleated RBC %   0  0


 


PT with INR   


 


INR   


 


PTT (Actin FS)   


 


Sodium   


 


Potassium   


 


Chloride   


 


Carbon Dioxide   


 


Anion Gap   


 


BUN   


 


Creatinine   


 


Creat Clearance w eGFR   


 


Random Glucose   


 


Calcium   


 


Phosphorus   


 


Magnesium   


 


Anti-A Titer   


 


Blood Type  O POSITIVE  


 


Antibody Screen  Negative  


 


Crossmatch  See Detail  














  01/14/19 01/14/19 01/14/19





  05:15 05:15 05:15


 


WBC   


 


RBC   


 


Hgb   


 


Hct   


 


MCV   


 


MCH   


 


MCHC   


 


RDW   


 


Plt Count   


 


MPV   


 


Absolute Neuts (auto)   


 


Neutrophils %   


 


Lymphocytes %   


 


Monocytes %   


 


Eosinophils %   


 


Basophils %   


 


Nucleated RBC %   


 


PT with INR   13.80 H 


 


INR   1.17 H 


 


PTT (Actin FS)   30.8 


 


Sodium  142  


 


Potassium  4.3  


 


Chloride  111 H  


 


Carbon Dioxide  26  


 


Anion Gap  6 L  


 


BUN  9  


 


Creatinine  1.3  


 


Creat Clearance w eGFR  57.10  


 


Random Glucose  150 H  


 


Calcium  7.2 L  


 


Phosphorus  3.8  


 


Magnesium  1.9  


 


Anti-A Titer   


 


Blood Type    O POSITIVE


 


Antibody Screen    Negative


 


Crossmatch    See Detail








Active Medications











Generic Name Dose Route Start Last Admin





  Trade Name Freq  PRN Reason Stop Dose Admin


 


Chlorhexidine Gluconate  1 applic  01/13/19 22:00  01/13/19 21:54





  Hibiclens For Decolonization -  TP   1 applic





  HS [facility]   Administration





     





     





     





     


 


Sodium Chloride  1,000 mls @ 1,000 mls/hr  01/14/19 03:15  





  Normal Saline -  IV  01/15/19 03:14  





  ASDIR PRN   





  Give the bolus if MAP < 65   





     





     





     


 


Metronidazole  500 mg in 100 mls @ 100 mls/hr  01/14/19 02:30  01/14/19 09:53





  Flagyl 500mg Premixed Ivpb -  IVPB   100 mls/hr





  Q8H-IV [facility]   Administration





     





     





     





     


 


Levofloxacin  750 mg in 150 mls @ 100 mls/hr  01/14/19 03:15  01/14/19 04:29





  Levaquin 750 Mg Premixed Ivpb -  IVPB   100 mls/hr





  DAILY [facility]   Administration





     





     





  Protocol   





     


 


Mupirocin  1 applic  01/13/19 22:00  01/14/19 10:00





  Bactroban Ointment (For Decolonization) -  NS  01/18/19 21:59  1 applic





  BID [facility]   Administration





     





     





     





     


 


Pantoprazole Sodium  40 mg  01/12/19 18:30  01/14/19 09:54





  Protonix -  PO   40 mg





  DAILY [facility]   Administration





     





     





     





     








 CBC, BMP





 01/14/19 05:15 





 01/14/19 05:15 








ASSESSMENT/PLAN:


58 year old female, with a significant PMH of HTN, HLD, DM, and hepatitis C (

noncompliant with all of his prescribed medications), who presents to the 

hospital for 2 days history of bright red blood per rectum admitted to ICU for 

further evaluation 





# Neuro


* AAox3 in NAD 


* schizophrenia , stable resume home meds 


# Lungs: 


* CTA B/L no tachypnea or hypoxia 


# GI


- Lower GI bleed 


* 2 days of bright red blood per rectum 


* S/P 4 units of PRBCS , HGb improved from 5.6 to 7.6 will transfuse one more 

units 


* 2 large IV Bores maintain 


* NPO since med night for EGD/colonscopy today 


* monitor H/H 


* type and cross 


* CT scan with colitis vs divertuclosis vs mass 


* transfuse threshold below 7 


# Hepc 


* F/o out pt 


#B/L inguinal hernia 


* Surgery on board 


* 


# 


* UA negative 


* BUN/Cr 9/1.3 [facility] upper GI 


* monitor lytes 


# FEN 


* F: no standing fluids 


* Emonitor lytes 


* N: npo for now 





# Proph 


* DVTS : SCDS , no chemical proph 


* GI : protonix 40 daily 


# Dispo 


* monitor in ICU 





# code 


full code 


 





Visit type





- Emergency Visit


Emergency Visit: Yes


ED Registration Date: 01/09/19


Care time: The patient presented to the Emergency Department on the above date 

and was hospitalized for further evaluation of their emergent condition.





- New Patient


This patient is new to me today: Yes


Date on this admission: 01/14/19





- Critical Care


Critical Care patient: Yes


Total Critical Care Time (in minutes): 45


Critical Care Statement: The care of this patient involved high complexity 

decision making to prevent further life threatening deterioration of the patient

's condition and/or to evaluate & treat vital organ system(s) failure or risk 

of failure.

## 2019-01-15 LAB
ALBUMIN SERPL-MCNC: 2 G/DL (ref 3.4–5)
ALP SERPL-CCNC: 38 U/L (ref 45–117)
ALT SERPL-CCNC: 10 U/L (ref 13–61)
ANION GAP SERPL CALC-SCNC: 6 MMOL/L (ref 8–16)
AST SERPL-CCNC: 10 U/L (ref 15–37)
BASOPHILS # BLD: 0.2 % (ref 0–2)
BILIRUB SERPL-MCNC: 0.3 MG/DL (ref 0.2–1)
BUN SERPL-MCNC: 7 MG/DL (ref 7–18)
CALCIUM SERPL-MCNC: 7.5 MG/DL (ref 8.5–10.1)
CHLORIDE SERPL-SCNC: 114 MMOL/L (ref 98–107)
CO2 SERPL-SCNC: 25 MMOL/L (ref 21–32)
CREAT SERPL-MCNC: 1.2 MG/DL (ref 0.55–1.3)
DEPRECATED RDW RBC AUTO: 16 % (ref 11.9–15.9)
DEPRECATED RDW RBC AUTO: 16.6 % (ref 11.9–15.9)
EOSINOPHIL # BLD: 0.3 % (ref 0–4.5)
GLUCOSE SERPL-MCNC: 147 MG/DL (ref 74–106)
HCT VFR BLD CALC: 19.2 % (ref 35.4–49)
HCT VFR BLD CALC: 21.3 % (ref 35.4–49)
HGB BLD-MCNC: 6.6 GM/DL (ref 11.7–16.9)
HGB BLD-MCNC: 7 GM/DL (ref 11.7–16.9)
LYMPHOCYTES # BLD: 13.9 % (ref 8–40)
MAGNESIUM SERPL-MCNC: 1.7 MG/DL (ref 1.8–2.4)
MCH RBC QN AUTO: 29.1 PG (ref 25.7–33.7)
MCH RBC QN AUTO: 30.5 PG (ref 25.7–33.7)
MCHC RBC AUTO-ENTMCNC: 33 G/DL (ref 32–35.9)
MCHC RBC AUTO-ENTMCNC: 34.6 G/DL (ref 32–35.9)
MCV RBC: 88.1 FL (ref 80–96)
MCV RBC: 88.3 FL (ref 80–96)
MONOCYTES # BLD AUTO: 7.3 % (ref 3.8–10.2)
NEUTROPHILS # BLD: 78.3 % (ref 42.8–82.8)
PHOSPHATE SERPL-MCNC: 3.4 MG/DL (ref 2.5–4.9)
PLATELET # BLD AUTO: 255 K/MM3 (ref 134–434)
PLATELET # BLD AUTO: 269 K/MM3 (ref 134–434)
PMV BLD: 7.5 FL (ref 7.5–11.1)
PMV BLD: 7.5 FL (ref 7.5–11.1)
POTASSIUM SERPLBLD-SCNC: 3.8 MMOL/L (ref 3.5–5.1)
PROT SERPL-MCNC: 4.7 G/DL (ref 6.4–8.2)
RBC # BLD AUTO: 2.18 M/MM3 (ref 4–5.6)
RBC # BLD AUTO: 2.42 M/MM3 (ref 4–5.6)
SODIUM SERPL-SCNC: 144 MMOL/L (ref 136–145)
WBC # BLD AUTO: 11.1 K/MM3 (ref 4–10)
WBC # BLD AUTO: 9.3 K/MM3 (ref 4–10)

## 2019-01-15 RX ADMIN — MUPIROCIN SCH APPLIC: 20 OINTMENT TOPICAL at 10:35

## 2019-01-15 RX ADMIN — PANTOPRAZOLE SODIUM SCH: 40 TABLET, DELAYED RELEASE ORAL at 17:50

## 2019-01-15 RX ADMIN — PANTOPRAZOLE SODIUM SCH MG: 40 INJECTION, POWDER, FOR SOLUTION INTRAVENOUS at 14:42

## 2019-01-15 RX ADMIN — CHLORHEXIDINE GLUCONATE SCH APPLIC: 213 SOLUTION TOPICAL at 21:30

## 2019-01-15 RX ADMIN — MUPIROCIN SCH: 20 OINTMENT TOPICAL at 02:06

## 2019-01-15 NOTE — PN
Progress Note, Physician


History of Present Illness: 


Pt with LGIB and CT showing R sided colitis, with bilateral inguinal hernias 

containing colon. In ICU for continued bleeding from rectum with Hb drop. Has 

gotten total 6 units PRBC through yesterday, now getting more for bloody BMs 

and Hb 7.0 this morning. Just had bloody, liquid BM in commode. He is seen and 

examined in ICU with Dr. Durand (GI), denies dizziness or abdominal pain. HR 

elevated, over 100, 145 now standing, per nurse it goes up when he moves/gets 

up. Pt had colonoscopy yesterday (with my assistance), which visualized to 

cecum and whole colon, with findings of diverticulosis but no colitis or mass, 

few small polyps (not resected) and hemorrhoids. There was old blood present, 

but no active bleeding. He apparently had more bloody BMs, one last night and 

one overnight. CTA was just done but is not read yet, but on my review does not 

show obvious active bleeding.





- Current Medication List


Current Medications: 


Active Medications





Chlorhexidine Gluconate (Hibiclens For Decolonization -)  1 applic TP HS [facility]


   Last Admin: 01/14/19 22:00 Dose:  1 applic


Metronidazole (Flagyl 500mg Premixed Ivpb -)  500 mg in 100 mls @ 100 mls/hr 

IVPB Q8H-IV [facility]


   Last Admin: 01/15/19 10:36 Dose:  100 mls/hr


Levofloxacin (Levaquin 750 Mg Premixed Ivpb -)  750 mg in 150 mls @ 100 mls/hr 

IVPB DAILY [facility]; Protocol


   Last Admin: 01/15/19 10:37 Dose:  100 mls/hr


Sodium Chloride (Normal Saline -)  1,000 mls @ 50 mls/hr IV ASDIR [facility]


   Last Admin: 01/15/19 07:08 Dose:  50 mls/hr


Mupirocin (Bactroban Ointment (For Decolonization) -)  1 applic NS BID [facility]


   Stop: 01/18/19 21:59


   Last Admin: 01/15/19 10:35 Dose:  1 applic


Pantoprazole Sodium (Protonix -)  40 mg PO DAILY [facility]


   Last Admin: 01/14/19 09:54 Dose:  40 mg











- Objective


Vital Signs: 


 Vital Signs











Temperature  99.0 F   01/15/19 08:00


 


Pulse Rate  96 H  01/15/19 10:00


 


Respiratory Rate  22 H  01/15/19 10:00


 


Blood Pressure  129/62   01/15/19 10:00


 


O2 Sat by Pulse Oximetry (%)  100   01/15/19 00:30











Constitutional: Yes: Well Nourished, No Distress, Calm


Eyes: Yes: Conjunctiva Clear, EOM Intact


HENT: Yes: Atraumatic, Normocephalic


Cardiovascular: Yes: Tachycardia


Gastrointestinal: Yes: Soft, Hernia (bilateral inguinal).  No: Tenderness


...Rectal Exam: Yes: Deferred, Other (bloody liquid BM in commode now)


Extremities: No: Cool, Cyanosis


Integumentary: No: Jaundice, Rash


Neurological: Yes: Alert, Oriented.  No: Unsteady Gait


Labs: 


 CBC, BMP





 01/15/19 04:25 





 01/15/19 04:25 





 





- ....Imaging


Cat Scan: Image Reviewed (CTA images reviewed - no obvious extravasation, 

report pending)





Problem List





- Problems


(1) Rectal bleeding


Assessment/Plan: 


CT suspicious for colitis, but none seen on scope, nor any masses


presumed diverticular bleeding, though none active at scope either


still with bloody BMs, tachycardia


CTA read pending


could consider bleeding scan if it's negative, to look for slower bleeding/

localize source


trend H/H


keep T&S up to date


s/p transfusion 6 units total PRBC, 7th in progress - transfuse prn


GI following - discussed with Dr. Durand


keep NPO or on clears only until bleeding definitely stops - NPO if actively 

bleeding





needs to have bleeding controlled before considering any type of hernia repair


would not address at this time





This patient is critically ill. Time spent reviewing chart, examining patient, 

talking with providers and/or family and documentation is 45 minutes.


Code(s): K62.5 - HEMORRHAGE OF ANUS AND RECTUM   





(2) Anemia


Code(s): D64.9 - ANEMIA, UNSPECIFIED   


Qualifiers: 


   Anemia type: other cause   Other causes of anemia: acute posthemorrhagic   

Qualified Code(s): D62 - Acute posthemorrhagic anemia   





(3) Inguinal hernia bilateral, non-recurrent


Code(s): K40.20 - BI INGUINAL HERNIA, W/O OBST OR GANGRENE, NOT SPCF AS RECUR   


Qualifiers: 


   Obstruction and gangrene presence: without obstruction or gangrene   

Recurrence: non-recurrent   Qualified Code(s): K40.20 - Bilateral inguinal 

hernia, without obstruction or gangrene, not specified as recurrent   





(4) Hypertension


Code(s): I10 - ESSENTIAL (PRIMARY) HYPERTENSION   


Qualifiers: 


   Hypertension type: essential hypertension   Qualified Code(s): I10 - 

Essential (primary) hypertension   





(5) Schizophrenia


Assessment/Plan: 


pt stated he is on zyprexa at home?


would continue home psych meds


Code(s): F20.9 - SCHIZOPHRENIA, UNSPECIFIED   


Qualifiers: 


   Schizophrenia type: unspecified   Qualified Code(s): F20.9 - Schizophrenia, 

unspecified

## 2019-01-15 NOTE — PN
Progress Note (short form)





- Note


Progress Note: 





Anesthesiology Post-op


58 y.o. man POD#1 s/p colonoscopy.


Pt. feels well and has no complaints. He has no pain. VSS.


58 y.o. man with stable post-procedure course.


Continue management as per primary team.

## 2019-01-15 NOTE — ECHO
______________________________________________________________________________



Name: NHAN LEDEZMA                                 Exam:Adult Echocardiogram

MRN: [id number]         Study Date: 01/15/2019 12:20 PM

Age: 58 yrs

______________________________________________________________________________



Reason For Study: ABNL EKG,ISCHMIC T WAVE ABNL

Height: 69 in        Weight: 240 lb        BSA: 2.2 m2



______________________________________________________________________________



MMode/2D Measurements & Calculations

IVSd: 1.1 cm                               Ao root diam: 4.3 cm

LVIDd: 4.2 cm                              LA dimension: 3.8 cm

LVIDs: 2.1 cm                              ACS: 2.8 cm

LVPWd: 1.9 cm

IVSs: 1.7 cm



____________________________________________

LVPWs: 2.1 cm                              EDV(Teich): 79.7 ml

                                           ESV(Teich): 15.0 ml



Doppler Measurements & Calculations

MV E max shekhar: 52.1 cm/sec                                 PA V2 max: 149.3 cm/sec

MV A max shekhar: 80.2 cm/sec                                 PA max P.9 mmHg

MV E/A: 0.65                                              PA V2 mean: 104.5 cm/sec

                                                          PA mean P.1 mmHg

                                                          PA V2 VTI: 22.1 cm



___________________________________________________________

Med Peak E' Shekhar: 5.8 cm/sec

Med E/e': 8.9

Lat Peak E' Shekhar: 6.1 cm/sec

Lat E/e': 8.5





______________________________________________________________________________

Left Ventricle

Mild to moderate concentric LVH with hyperdynamic LV function. Ejection Fraction = 74%. Abnormal finch
tolic

compliance with normal LA pressure.

Right Ventricle

Normal RV size and function.

Atria

Normal left and right atrial size and function.

Mitral Valve

The mitral valve leaflets appear normal. There is no evidence of stenosis, fluttering, or prolapse. T
here is

trace mitral regurgitation.

Tricuspid Valve

The tricuspid valve is normal. There is trace tricuspid regurgitation.

Aortic Valve

The aortic valve is not well visualized.

Pulmonic Valve

The pulmonic valve leaflets are thin and pliable; valve motion is normal.

Great Vessels

Mildly dialated Ao root.



______________________________________________________________________________



Interpretation Summary

Mild to moderate concentric LVH with hyperdynamic LV function.

Ejection Fraction = 74%.

Abnormal diastolic compliance with normal LA pressure.

Normal RV size and function.

Normal left and right atrial size and function.

The mitral valve leaflets appear normal. There is no evidence of stenosis, fluttering, or prolapse.

There is trace mitral regurgitation.

The tricuspid valve is normal.

The aortic valve is not well visualized.

Mildly dialated Ao root.







MD Minor Childress 01/15/2019 04:01 PM Good

## 2019-01-15 NOTE — PN
Progress Note, Physician


History of Present Illness: 





events noted


had bleeding per rectum


for bleeding scan today





- Current Medication List


Current Medications: 


Active Medications





Chlorhexidine Gluconate (Hibiclens For Decolonization -)  1 applic TP HS [facility]


   Last Admin: 01/14/19 22:00 Dose:  1 applic


Metronidazole (Flagyl 500mg Premixed Ivpb -)  500 mg in 100 mls @ 100 mls/hr 

IVPB Q8H-IV [facility]


   Last Admin: 01/15/19 10:36 Dose:  100 mls/hr


Levofloxacin (Levaquin 750 Mg Premixed Ivpb -)  750 mg in 150 mls @ 100 mls/hr 

IVPB DAILY [facility]; Protocol


   Last Admin: 01/15/19 10:37 Dose:  100 mls/hr


Sodium Chloride (Normal Saline -)  1,000 mls @ 50 mls/hr IV [facility]


   Last Admin: 01/15/19 07:08 Dose:  50 mls/hr


Mupirocin (Bactroban Ointment (For Decolonization) -)  1 applic NS BID [facility]


   Stop: 01/18/19 21:59


   Last Admin: 01/15/19 10:35 Dose:  1 applic


Olanzapine (Zyprexa -)  5 mg PO HS [facility]


Pantoprazole Sodium (Protonix Iv)  40 mg IVPUSH DAILY [facility]


   Last Admin: 01/15/19 14:42 Dose:  40 mg











- Objective


Vital Signs: 


 Vital Signs











Temperature  98 F   01/15/19 14:00


 


Pulse Rate  108 H  01/15/19 14:00


 


Respiratory Rate  21 H  01/15/19 14:00


 


Blood Pressure  107/60   01/15/19 14:00


 


O2 Sat by Pulse Oximetry (%)  97   01/15/19 09:00











Constitutional: Yes: No Distress


HENT: Yes: Atraumatic


Neck: Yes: Supple


Cardiovascular: Yes: Regular Rate and Rhythm


Respiratory: Yes: CTA Bilaterally


Gastrointestinal: Yes: Normal Bowel Sounds


Extremities: Yes: WNL


Edema: No


Peripheral Pulses WNL: Yes


Neurological: Yes: Alert, Oriented


Labs: 


 CBC, BMP





 01/15/19 04:25 





 01/15/19 04:25 





 INR, PTT











INR  1.17  (0.83-1.09)  H  01/14/19  05:15    














Problem List





- Problems


(1) Rectal bleeding


Assessment/Plan: 


another episode of bleeding per rectum today


npo


ivf


for bleeding scan


Code(s): NORMA62.5 - HEMORRHAGE OF ANUS AND RECTUM   





(2) Benign essential HTN


Code(s): I10 - ESSENTIAL (PRIMARY) HYPERTENSION   





(3) Anemia


Assessment/Plan: 


s/p prbc transfusion


Code(s): D64.9 - ANEMIA, UNSPECIFIED   


Qualifiers: 


   Anemia type: other cause   Other causes of anemia: acute posthemorrhagic   

Qualified Code(s): D62 - Acute posthemorrhagic anemia   





(4) Colitis


Code(s): K52.9 - NONINFECTIVE GASTROENTERITIS AND COLITIS, UNSPECIFIED   





(5) Inguinal hernia bilateral, non-recurrent


Assessment/Plan: 


d/w wit dr silva for surgical options, she will talk to other surgeons and let 

us know


Code(s): K40.20 - BI INGUINAL HERNIA, W/O OBST OR GANGRENE, NOT SPCF AS RECUR   


Qualifiers: 


   Obstruction and gangrene presence: without obstruction or gangrene   

Recurrence: non-recurrent   Qualified Code(s): K40.20 - Bilateral inguinal 

hernia, without obstruction or gangrene, not specified as recurrent   





(6) Schizophrenia


Assessment/Plan: 


will check about psych meds


Code(s): F20.9 - SCHIZOPHRENIA, UNSPECIFIED   


Qualifiers: 


   Schizophrenia type: unspecified   Qualified Code(s): F20.9 - Schizophrenia, 

unspecified   





(7) Hypertension


Assessment/Plan: 


monitor bp


Code(s): I10 - ESSENTIAL (PRIMARY) HYPERTENSION   


Qualifiers: 


   Hypertension type: essential hypertension   Qualified Code(s): I10 - 

Essential (primary) hypertension

## 2019-01-15 NOTE — PN
Physical Exam: 


SUBJECTIVE: Patient seen and examined, S/P EGD, colonoscopy 


3 bloody BM over night with bright red blood , reports some light headedness 

when he get out of bed hgb 7 this AM S/P 6 PRBCS 





OBJECTIVE:





 Vital Signs











 Period  Temp  Pulse  Resp  BP Sys/Dinero  Pulse Ox


 


 Last 24 Hr  99.0 F-100.2 F    15-26  104-158/  100











GENERAL: AAOx3 in AND 


HEAD: NC/AT 


EYES: PERRL, EOMI,


ENT: MMM 


NECK: Trachea midline, full range of motion, supple. 


LUNGS: Breath sounds equal, clear to auscultation bilaterally, no wheezes, no 

crackles, no 


accessory muscle use. 


HEART: Regular rate and rhythm, S1, S2 without murmur, rub or gallop.


ABDOMEN: Soft, lower abdominal tenderness , nondistended, normoactive bowel 

sounds, no guarding, no rebound, b/l big  inguinal hernia 


EXTREMITIES: 2+ pulses, warm, well-perfused, no edema. 


NEUROLOGICAL: no focal deficit . Normal speech, gait not 


observed.


PSYCH: Normal mood, normal affect.


SKIN: Warm, dry, normal turgor, no rashes or lesions noted











 Laboratory Results - last 24 hr











  01/14/19 01/14/19 01/14/19





  05:15 17:30 22:30


 


WBC   14.3 H  13.5 H


 


RBC   2.78 L  2.62 L


 


Hgb   8.4 L  7.9 L


 


Hct   24.2 L  22.6 L


 


MCV   86.8  86.1


 


MCH   30.1  30.0


 


MCHC   34.7  34.9


 


RDW   17.0 H  16.8 H


 


Plt Count   299  D  278


 


MPV   7.6  7.5


 


Absolute Neuts (auto)   


 


Neutrophils %   


 


Lymphocytes %   


 


Monocytes %   


 


Eosinophils %   


 


Basophils %   


 


Nucleated RBC %   


 


Sodium   


 


Potassium   


 


Chloride   


 


Carbon Dioxide   


 


Anion Gap   


 


BUN   


 


Creatinine   


 


Creat Clearance w eGFR   


 


Random Glucose   


 


Calcium   


 


Phosphorus   


 


Magnesium   


 


Total Bilirubin   


 


AST   


 


ALT   


 


Alkaline Phosphatase   


 


Total Protein   


 


Albumin   


 


Blood Type  O POSITIVE  


 


Antibody Screen  Negative  


 


Crossmatch  See Detail  














  01/15/19 01/15/19





  04:25 04:25


 


WBC   11.1 H


 


RBC   2.42 L


 


Hgb   7.0 L


 


Hct   21.3 L


 


MCV   88.1


 


MCH   29.1


 


MCHC   33.0


 


RDW   16.6 H


 


Plt Count   255


 


MPV   7.5


 


Absolute Neuts (auto)   8.7 H


 


Neutrophils %   78.3


 


Lymphocytes %   13.9  D


 


Monocytes %   7.3


 


Eosinophils %   0.3


 


Basophils %   0.2


 


Nucleated RBC %   0


 


Sodium  144 


 


Potassium  3.8 


 


Chloride  114 H 


 


Carbon Dioxide  25 


 


Anion Gap  6 L 


 


BUN  7 


 


Creatinine  1.2 


 


Creat Clearance w eGFR  > 60 


 


Random Glucose  147 H 


 


Calcium  7.5 L 


 


Phosphorus  3.4 


 


Magnesium  1.7 L 


 


Total Bilirubin  0.3 


 


AST  10 L 


 


ALT  10 L 


 


Alkaline Phosphatase  38 L 


 


Total Protein  4.7 L 


 


Albumin  2.0 L 


 


Blood Type  


 


Antibody Screen  


 


Crossmatch  








Active Medications











Generic Name Dose Route Start Last Admin





  Trade Name Freq  PRN Reason Stop Dose Admin


 


Chlorhexidine Gluconate  1 applic  01/13/19 22:00  01/14/19 22:00





  Hibiclens For Decolonization -  TP   1 applic





  HS [facility]   Administration





     





     





     





     


 


Metronidazole  500 mg in 100 mls @ 100 mls/hr  01/14/19 02:30  01/15/19 10:36





  Flagyl 500mg Premixed Ivpb -  IVPB   100 mls/hr





  Q8H-IV [facility]   Administration





     





     





     





     


 


Levofloxacin  750 mg in 150 mls @ 100 mls/hr  01/14/19 03:15  01/15/19 10:37





  Levaquin 750 Mg Premixed Ivpb -  IVPB   100 mls/hr





  DAILY [facility]   Administration





     





     





  Protocol   





     


 


Sodium Chloride  1,000 mls @ 50 mls/hr  01/15/19 06:22  01/15/19 07:08





  Normal Saline -  IV   50 mls/hr





  ASDIR [facility]   Administration





     





     





     





     


 


Mupirocin  1 applic  01/13/19 22:00  01/15/19 10:35





  Bactroban Ointment (For Decolonization) -  NS  01/18/19 21:59  1 applic





  BID [facility]   Administration





     





     





     





     


 


Pantoprazole Sodium  40 mg  01/12/19 18:30  01/14/19 09:54





  Protonix -  PO   40 mg





  DAILY [facility]   Administration





     





     





     





     








 CBC, BMP





 01/15/19 04:25 





 01/15/19 04:25 








ASSESSMENT/PLAN:


58 year old female, with a significant PMH of HTN, HLD, DM, and hepatitis C (

noncompliant with all of his prescribed medications), who presents to the 

hospital for 2 days history of bright red blood per rectum admitted to ICU for 

further evaluation 





# Neuro


* AAox3 in NAD 


* schizophrenia , stable resume home meds 


# Lungs: 


* CTA B/L no tachypnea or hypoxia 


# GI


- Lower GI bleed 


* 3 bloody BM over night 


* EGD/colonoscopy with internal hemorrhoids and diverticulosis no colitis 


* S/P 7 units of PRBCS , HGb improved from 5.6 to 7.6....7 this AM  


* CTA 


* bleeding scan 


* surgery on board and IR (spoke with kyleigh Daley and mentioned no IR 

intervention at this point will proceed with bleeding scan )


* maintain 2 large IV Bores maintain 


* NPO 


* monitor H/H  Q 6hr 


* transfuse threshold below 8 


* Calcium gluconate for multiple transfusion 


# Hepc 


* F/o out pt 


#B/L inguinal hernia 


* Surgery on board 


* 


# 


* UA negative 


* BUN/Cr 9/1.3....7/1.2  unlikley upper GI 


* monitor lytes 


# FEN 


* F: no standing fluids 


* Emonitor lytes 


* N: npo for now 





# Proph 


* DVTS : SCDS , no chemical proph 


* GI : protonix  IV 40 daily 


# Dispo 


* monitor in ICU 





# code 


* full code 





Visit type





- Emergency Visit


Emergency Visit: Yes


ED Registration Date: 01/09/19


Care time: The patient presented to the Emergency Department on the above date 

and was hospitalized for further evaluation of their emergent condition.





- New Patient


This patient is new to me today: No





- Critical Care


Critical Care patient: Yes


Total Critical Care Time (in minutes): 45


Critical Care Statement: The care of this patient involved high complexity 

decision making to prevent further life threatening deterioration of the patient

's condition and/or to evaluate & treat vital organ system(s) failure or risk 

of failure.





- Discharge Referral


Referred to [facility] No

## 2019-01-15 NOTE — PN
Progress Note (short form)





- Note


Progress Note: 





Discussed CTA with Dr. Victoria. hyperemia in sigmoid colon however no discrete 

area of bleeding noted. Ordered bleeding scan. 








Problem List





- Problems


(1) Rectal bleeding


Code(s): K62.5 - HEMORRHAGE OF ANUS AND RECTUM

## 2019-01-15 NOTE — PN
Progress Note (short form)





- Note


Progress Note: 


Reviewed Bleeding Scan with Dr. Stubbs this evening. tracer pooling noted in 

sigmoid region. Possible distal colon bleeding source.  Left message to D/W Dr. Abdalla. Keep NPO, IV hydration. Monitor CBC.  





Problem List





- Problems


(1) Rectal bleeding


Code(s): K62.5 - HEMORRHAGE OF ANUS AND RECTUM

## 2019-01-15 NOTE — PN
GI Progress Note


Subjective: 





Had bloody BM just now


No abdominal pain


7 U PRBC requirement so far














- Objective


Vital Signs: 


 Vital Signs











Temperature  99.0 F   01/15/19 08:00


 


Pulse Rate  96 H  01/15/19 10:00


 


Respiratory Rate  22 H  01/15/19 10:00


 


Blood Pressure  129/62   01/15/19 10:00


 


O2 Sat by Pulse Oximetry (%)  100   01/15/19 00:30











Constitutional: Calm


Eyes: No: Sclera Icterus


Cardiovascular: Yes: Tachycardia


Respiratory: Yes: CTA Bilaterally


Gastrointestinal Inspection: No: Distention


...Auscultate: Yes: Normoactive Bowel Sounds


...Palpate: Yes: Soft.  No: Hepatomegaly, Mass, Tenderness


...Percussion: No: Tympanitic


Edema: Yes


Edema: LLE: 1+, RLE: 1+


Neurological: Yes: Alert


Labs: 


 CBC, BMP





 01/15/19 04:25 





 01/15/19 04:25 





 INR, PTT











INR  1.17  (0.83-1.09)  H  01/14/19  05:15    














Problem List





- Problems


(1) Rectal bleeding


Assessment/Plan: 


Continued intermittent active bleeding. Suspected diverticular bleeding given 

colonoscopy findings from yesterday with the bilateral inguinal hernias 

creating a techincally challenging and prolonged procedure.


Called radiology to have  CTA formally read


If unrevealing and bleeding persists, Bleeding scan to help localize bleeding


Surgery is onboard


Continue supportive measures / ICU care, NPO/IV hydration





Code(s): K62.5 - HEMORRHAGE OF ANUS AND RECTUM

## 2019-01-16 LAB
ALBUMIN SERPL-MCNC: 2 G/DL (ref 3.4–5)
ALP SERPL-CCNC: 36 U/L (ref 45–117)
ALT SERPL-CCNC: 9 U/L (ref 13–61)
ANION GAP SERPL CALC-SCNC: 3 MMOL/L (ref 8–16)
APTT BLD: 31.8 SECONDS (ref 25.2–36.5)
AST SERPL-CCNC: 8 U/L (ref 15–37)
BASOPHILS # BLD: 0.3 % (ref 0–2)
BASOPHILS # BLD: 0.3 % (ref 0–2)
BILIRUB SERPL-MCNC: 0.5 MG/DL (ref 0.2–1)
BUN SERPL-MCNC: 6 MG/DL (ref 7–18)
CALCIUM SERPL-MCNC: 7.5 MG/DL (ref 8.5–10.1)
CHLORIDE SERPL-SCNC: 116 MMOL/L (ref 98–107)
CO2 SERPL-SCNC: 24 MMOL/L (ref 21–32)
CREAT SERPL-MCNC: 1.3 MG/DL (ref 0.55–1.3)
DEPRECATED RDW RBC AUTO: 16.8 % (ref 11.9–15.9)
DEPRECATED RDW RBC AUTO: 16.8 % (ref 11.9–15.9)
DEPRECATED RDW RBC AUTO: 16.9 % (ref 11.9–15.9)
DEPRECATED RDW RBC AUTO: 17.3 % (ref 11.9–15.9)
EOSINOPHIL # BLD: 1.9 % (ref 0–4.5)
EOSINOPHIL # BLD: 2.5 % (ref 0–4.5)
GLUCOSE SERPL-MCNC: 119 MG/DL (ref 74–106)
HCT VFR BLD CALC: 21.3 % (ref 35.4–49)
HCT VFR BLD CALC: 22.5 % (ref 35.4–49)
HCT VFR BLD CALC: 24.9 % (ref 35.4–49)
HCT VFR BLD CALC: 25.2 % (ref 35.4–49)
HGB BLD-MCNC: 7.6 GM/DL (ref 11.7–16.9)
HGB BLD-MCNC: 7.9 GM/DL (ref 11.7–16.9)
HGB BLD-MCNC: 8.7 GM/DL (ref 11.7–16.9)
HGB BLD-MCNC: 8.9 GM/DL (ref 11.7–16.9)
INR BLD: 1.2 (ref 0.83–1.09)
LYMPHOCYTES # BLD: 15.4 % (ref 8–40)
LYMPHOCYTES # BLD: 17 % (ref 8–40)
MAGNESIUM SERPL-MCNC: 2 MG/DL (ref 1.8–2.4)
MCH RBC QN AUTO: 30.1 PG (ref 25.7–33.7)
MCH RBC QN AUTO: 30.3 PG (ref 25.7–33.7)
MCH RBC QN AUTO: 30.8 PG (ref 25.7–33.7)
MCH RBC QN AUTO: 31.2 PG (ref 25.7–33.7)
MCHC RBC AUTO-ENTMCNC: 34.6 G/DL (ref 32–35.9)
MCHC RBC AUTO-ENTMCNC: 35.2 G/DL (ref 32–35.9)
MCHC RBC AUTO-ENTMCNC: 35.5 G/DL (ref 32–35.9)
MCHC RBC AUTO-ENTMCNC: 35.5 G/DL (ref 32–35.9)
MCV RBC: 85.2 FL (ref 80–96)
MCV RBC: 86.6 FL (ref 80–96)
MCV RBC: 87.1 FL (ref 80–96)
MCV RBC: 88.6 FL (ref 80–96)
MONOCYTES # BLD AUTO: 8.6 % (ref 3.8–10.2)
MONOCYTES # BLD AUTO: 9.6 % (ref 3.8–10.2)
NEUTROPHILS # BLD: 71.6 % (ref 42.8–82.8)
NEUTROPHILS # BLD: 72.8 % (ref 42.8–82.8)
PHOSPHATE SERPL-MCNC: 3.6 MG/DL (ref 2.5–4.9)
PLATELET # BLD AUTO: 233 K/MM3 (ref 134–434)
PLATELET # BLD AUTO: 262 K/MM3 (ref 134–434)
PLATELET # BLD AUTO: 263 K/MM3 (ref 134–434)
PLATELET # BLD AUTO: 278 K/MM3 (ref 134–434)
PMV BLD: 7.2 FL (ref 7.5–11.1)
PMV BLD: 7.4 FL (ref 7.5–11.1)
PMV BLD: 7.6 FL (ref 7.5–11.1)
PMV BLD: 7.7 FL (ref 7.5–11.1)
POTASSIUM SERPLBLD-SCNC: 4.1 MMOL/L (ref 3.5–5.1)
PROT SERPL-MCNC: 4.4 G/DL (ref 6.4–8.2)
PT PNL PPP: 14.2 SEC (ref 9.7–13)
RBC # BLD AUTO: 2.5 M/MM3 (ref 4–5.6)
RBC # BLD AUTO: 2.54 M/MM3 (ref 4–5.6)
RBC # BLD AUTO: 2.88 M/MM3 (ref 4–5.6)
RBC # BLD AUTO: 2.89 M/MM3 (ref 4–5.6)
SODIUM SERPL-SCNC: 143 MMOL/L (ref 136–145)
WBC # BLD AUTO: 8.8 K/MM3 (ref 4–10)
WBC # BLD AUTO: 9 K/MM3 (ref 4–10)
WBC # BLD AUTO: 9 K/MM3 (ref 4–10)
WBC # BLD AUTO: 9.7 K/MM3 (ref 4–10)

## 2019-01-16 PROCEDURE — 30233K1 TRANSFUSION OF NONAUTOLOGOUS FROZEN PLASMA INTO PERIPHERAL VEIN, PERCUTANEOUS APPROACH: ICD-10-PCS | Performed by: INTERNAL MEDICINE

## 2019-01-16 PROCEDURE — 30233L1 TRANSFUSION OF NONAUTOLOGOUS FRESH PLASMA INTO PERIPHERAL VEIN, PERCUTANEOUS APPROACH: ICD-10-PCS | Performed by: INTERNAL MEDICINE

## 2019-01-16 RX ADMIN — MUPIROCIN SCH: 20 OINTMENT TOPICAL at 17:07

## 2019-01-16 RX ADMIN — PANTOPRAZOLE SODIUM SCH MG: 40 INJECTION, POWDER, FOR SOLUTION INTRAVENOUS at 09:41

## 2019-01-16 RX ADMIN — CHLORHEXIDINE GLUCONATE SCH APPLIC: 213 SOLUTION TOPICAL at 22:30

## 2019-01-16 RX ADMIN — DEXTROSE AND SODIUM CHLORIDE SCH MLS/HR: 5; 900 INJECTION, SOLUTION INTRAVENOUS at 13:24

## 2019-01-16 RX ADMIN — MUPIROCIN SCH APPLIC: 20 OINTMENT TOPICAL at 22:30

## 2019-01-16 NOTE — PN
Progress Note, Physician


Chief Complaint: 





Hematochezia


History of Present Illness: 


Pt seen/examined at bedside, states he "feels good", no further bms since 

yesterday, no blood reported, as also d/w nursing staff. Pt denies abdominal 

pain, n/v, fever/chills. Bleeding scan yesterday did not localize bleeding 

source.





- Current Medication List


Current Medications: 


Active Medications





Chlorhexidine Gluconate (Hibiclens For Decolonization -)  1 applic TP HS [facility]


   Last Admin: 01/15/19 21:30 Dose:  1 applic


Metronidazole (Flagyl 500mg Premixed Ivpb -)  500 mg in 100 mls @ 100 mls/hr 

IVPB Q8H-IV [facility]


   Last Admin: 01/16/19 11:34 Dose:  100 mls/hr


Levofloxacin (Levaquin 750 Mg Premixed Ivpb -)  750 mg in 150 mls @ 100 mls/hr 

IVPB DAILY [facility]; Protocol


   Last Admin: 01/16/19 10:40 Dose:  100 mls/hr


Sodium Chloride (Normal Saline -)  1,000 mls @ 50 mls/hr IV [facility]


   Last Admin: 01/15/19 07:08 Dose:  50 mls/hr


Dextrose/Sodium Chloride (D5-Ns -)  1,000 mls @ 100 mls/hr IV [facility]


Mupirocin (Bactroban Ointment (For Decolonization) -)  1 applic NS BID [facility]


   Stop: 01/18/19 21:59


   Last Admin: 01/15/19 10:35 Dose:  1 applic


Olanzapine (Zyprexa -)  5 mg PO HS [facility]


   Last Admin: 01/15/19 21:30 Dose:  5 mg


Pantoprazole Sodium (Protonix Iv)  40 mg IVPUSH DAILY [facility]


   Last Admin: 01/16/19 09:41 Dose:  40 mg











- Objective


Vital Signs: 


 Vital Signs











Temperature  98.9 F   01/16/19 13:04


 


Pulse Rate  92 H  01/16/19 13:04


 


Respiratory Rate  22 H  01/16/19 13:04


 


Blood Pressure  115/57 L  01/16/19 12:00


 


O2 Sat by Pulse Oximetry (%)  99   01/16/19 09:00











Constitutional: Yes: Well Nourished, No Distress, Calm


HENT: Yes: WNL, Atraumatic


Cardiovascular: Yes: WNL, Regular Rate and Rhythm


Respiratory: Yes: WNL, Regular, CTA Bilaterally


Gastrointestinal: Yes: WNL, Normal Bowel Sounds, Soft, Other (Abd soft, 

nontender,nondistended)


Labs: 


 CBC, BMP





 01/16/19 05:30 





 01/16/19 05:30 





 INR, PTT











INR  1.17  (0.83-1.09)  H  01/14/19  05:15    














Assessment/Plan





59yo male with hematochezia s/p colonoscopy on 1/14/19 revealing diverticulosis

, hemorrhoids, with stool and old blood, no active bleed identified however 

suspected diverticular in origin. Bleeding scan yesterday without active 

bleeding identified. Hb 7.9 receiving additional 1u prbc this am per MICU team. 

No further overt bleeding reported (last bm yesterday per nursing staff) and pt 

remains hemodynamically stable. Overall low suspicion at this time for UGI 

source. 





-Continue to closely monitor Hb and for further evidence of bleeding


-Continue supportive measures


-Follow up repeat CBC post transfusion


-Continue PPI for now


-Likely low yield for repeat endoscopy currently in absence of overt bleeding 

as would again be diagnostic, not for therapeutic purpose (though pt would 

require repeat colonoscopy electively for polypectomies).


-If further overt bleed, with acute drop in Hb, or hemodynamic instability, 

notify GI though would recommend repeat CTA and IR consultation


-Surgery on board


-Will continue to follow


-Please call if any questions/concerns in the interim

## 2019-01-16 NOTE — PN
Progress Note, Physician


History of Present Illness: 


Pt with LGIB and CT showing thickened right colon, with bilateral inguinal 

hernias containing colon. In ICU for continued bleeding from rectum with Hb 

drops. He has gotten total 10 units PRBC through today, last unit this morning 

for 7.9, with Hb just back at 8.9 now. Last dark maroon/bloody, liquid BM about 

an hour ago, second for today (first had some clots per nurse). Colonoscopy 

Monday (with my assistance), visualized to cecum and whole colon, with findings 

of diverticulosis but no colitis or mass, few small polyps (not resected) and 

hemorrhoids. There was old blood present, but no active bleeding. He had more 

bloody BMs, after that into yesterday. CTA was done yesterday, as well as 

nuclear bleeding scan, neither of which identified active bleeding. He is seen 

and examined in ICU bed, denies dizziness or abdominal pain. HR 90s, BP 110s/57-

70. He has been NPO since colonoscopy. He is also getting 2 units FFP today. He 

has not had EGD.





- Current Medication List


Current Medications: 


Active Medications





Chlorhexidine Gluconate (Hibiclens For Decolonization -)  1 applic TP HS [facility]


   Last Admin: 01/15/19 21:30 Dose:  1 applic


Metronidazole (Flagyl 500mg Premixed Ivpb -)  500 mg in 100 mls @ 100 mls/hr 

IVPB Q8H-IV [facility]


   Last Admin: 01/16/19 11:34 Dose:  100 mls/hr


Levofloxacin (Levaquin 750 Mg Premixed Ivpb -)  750 mg in 150 mls @ 100 mls/hr 

IVPB DAILY [facility]; Protocol


   Last Admin: 01/16/19 10:40 Dose:  100 mls/hr


Dextrose/Sodium Chloride (D5-Ns -)  1,000 mls @ 100 mls/hr IV [facility]


   Last Admin: 01/16/19 13:24 Dose:  100 mls/hr


Mupirocin (Bactroban Ointment (For Decolonization) -)  1 applic NS BID [facility]


   Stop: 01/18/19 21:59


   Last Admin: 01/15/19 10:35 Dose:  1 applic


Olanzapine (Zyprexa -)  5 mg PO HS [facility]


   Last Admin: 01/15/19 21:30 Dose:  5 mg


Pantoprazole Sodium (Protonix Iv)  40 mg IVPUSH DAILY [facility]


   Last Admin: 01/16/19 09:41 Dose:  40 mg











- Objective


Vital Signs: 


 Vital Signs











Temperature  98.9 F   01/16/19 13:04


 


Pulse Rate  92 H  01/16/19 13:04


 


Respiratory Rate  22 H  01/16/19 13:04


 


Blood Pressure  115/57 L  01/16/19 12:00


 


O2 Sat by Pulse Oximetry (%)  99   01/16/19 09:00











Constitutional: Yes: Well Nourished, No Distress, Calm


Eyes: Yes: Conjunctiva Clear, EOM Intact


HENT: Yes: Atraumatic, Normocephalic


Cardiovascular: Yes: Tachycardia.  No: Pulse Irregular


Gastrointestinal: Yes: Soft.  No: Tenderness


...Rectal Exam: Yes: Deferred, Other (see hpi)


Extremities: No: Cool, Cyanosis


Integumentary: No: Jaundice, Rash


Neurological: Yes: Alert, Oriented


Labs: 


 CBC, BMP





 01/16/19 14:30 





 01/16/19 05:30 





 INR, PTT











INR  1.20  (0.83-1.09)  H  01/16/19  14:30    








 CMP











Sodium  143 mmol/L (136-145)   01/16/19  05:30    


 


Potassium  4.1 mmol/L (3.5-5.1)   01/16/19  05:30    


 


Chloride  116 mmol/L ()  H  01/16/19  05:30    


 


Carbon Dioxide  24 mmol/L (21-32)   01/16/19  05:30    


 


Anion Gap  3 MMOL/L (8-16)  L  01/16/19  05:30    


 


BUN  6 mg/dL (7-18)  L  01/16/19  05:30    


 


Creatinine  1.3 mg/dL (0.55-1.3)   01/16/19  05:30    


 


Creat Clearance w eGFR  57.10  (>60)   01/16/19  05:30    


 


Random Glucose  119 mg/dL ()  H  01/16/19  05:30    


 


Calcium  7.5 mg/dL (8.5-10.1)  L  01/16/19  05:30    


 


Phosphorus  3.6 mg/dL (2.5-4.9)   01/16/19  05:30    


 


Magnesium  2.0 mg/dL (1.8-2.4)   01/16/19  05:30    


 


Total Bilirubin  0.5 mg/dL (0.2-1)   01/16/19  05:30    


 


AST  8 U/L (15-37)  L  01/16/19  05:30    


 


ALT  9 U/L (13-61)  L  01/16/19  05:30    


 


Alkaline Phosphatase  36 U/L ()  L  01/16/19  05:30    


 


  


 


Troponin I  0.09 ng/ml (0.00-0.05)  H  01/16/19  05:30    


 


     


 


Total Protein  4.4 g/dl (6.4-8.2)  L  01/16/19  05:30    


 


Albumin  2.0 g/dl (3.4-5.0)  L  01/16/19  05:30    














- ....Imaging


Other: Report Reviewed, Image Reviewed (images reviewed - no active source of 

bleeding identified)





Problem List





- Problems


(1) Rectal bleeding


Assessment/Plan: 


CT suspicious for colitis, but none seen on scope, nor any masses


presumed diverticular bleeding, though none active at scope either


still with intermittent dark bloody BMs, tachycardia


CTA and bleeding scan without localization of bleeding source





keep NPO


trend H/H q6H


keep T&S up to date


s/p transfusion 10 units total PRBC


GI following - discussed with Dr. Toledo - they will plan EGD tomorrow to 

definitively r/o UGI source, though unlikely





would not consider taking to OR without localization of bleeding source


will follow up


discussed with ICU residents Dr. Hughes and Lloyd





This patient is critically ill. Time spent reviewing chart, examining patient, 

talking with providers and/or family and documentation is 45 minutes.


Code(s): K62.5 - HEMORRHAGE OF ANUS AND RECTUM   





(2) Anemia


Code(s): D64.9 - ANEMIA, UNSPECIFIED   


Qualifiers: 


   Anemia type: other cause   Other causes of anemia: acute posthemorrhagic   

Qualified Code(s): D62 - Acute posthemorrhagic anemia   





(3) Inguinal hernia bilateral, non-recurrent


Assessment/Plan: 


bilateral direct inguinal - left with cecum and TI, right had sigmoid, but it 

is now inside abdomen as of CTA yesterday


needs bleeding to stop and be controlled before consideration of addressing 

hernias surgically


would not address at this time


Code(s): K40.20 - BI INGUINAL HERNIA, W/O OBST OR GANGRENE, NOT SPCF AS RECUR   


Qualifiers: 


   Obstruction and gangrene presence: without obstruction or gangrene   

Recurrence: non-recurrent   Qualified Code(s): K40.20 - Bilateral inguinal 

hernia, without obstruction or gangrene, not specified as recurrent   





(4) Hypertension


Code(s): I10 - ESSENTIAL (PRIMARY) HYPERTENSION   


Qualifiers: 


   Hypertension type: essential hypertension   Qualified Code(s): I10 - 

Essential (primary) hypertension   





(5) Schizophrenia


Assessment/Plan: 


on zyprexa 


note on chart indicates he takes depakote 500mg 4 pills hs as well as some 

inhalers





Code(s): F20.9 - SCHIZOPHRENIA, UNSPECIFIED   


Qualifiers: 


   Schizophrenia type: unspecified   Qualified Code(s): F20.9 - Schizophrenia, 

unspecified

## 2019-01-16 NOTE — PN
Progress Note, Physician


History of Present Illness: 





events noted


had bleeding per rectum


 bleeding scan did not show any bleeding vessel





- Current Medication List


Current Medications: 


Active Medications





Chlorhexidine Gluconate (Hibiclens For Decolonization -)  1 applic TP HS Replaced by [facility]


   Last Admin: 01/15/19 21:30 Dose:  1 applic


Metronidazole (Flagyl 500mg Premixed Ivpb -)  500 mg in 100 mls @ 100 mls/hr 

IVPB Q8H-IV [facility]


   Last Admin: 01/16/19 17:06 Dose:  100 mls/hr


Levofloxacin (Levaquin 750 Mg Premixed Ivpb -)  750 mg in 150 mls @ 100 mls/hr 

IVPB DAILY [facility]; Protocol


   Last Admin: 01/16/19 10:40 Dose:  100 mls/hr


Dextrose/Sodium Chloride (D5-Ns -)  1,000 mls @ 100 mls/hr IV [facility]


   Last Admin: 01/16/19 13:24 Dose:  100 mls/hr


Mupirocin (Bactroban Ointment (For Decolonization) -)  1 applic NS BID [facility]


   Stop: 01/18/19 21:59


   Last Admin: 01/16/19 17:07 Dose:  Not Given


Olanzapine (Zyprexa -)  5 mg PO HS Replaced by [facility]


   Last Admin: 01/15/19 21:30 Dose:  5 mg


Pantoprazole Sodium (Protonix Iv)  40 mg IVPUSH DAILY [facility]


   Last Admin: 01/16/19 09:41 Dose:  40 mg











- Objective


Vital Signs: 


 Vital Signs











Temperature  98.9 F   01/16/19 13:04


 


Pulse Rate  92 H  01/16/19 13:04


 


Respiratory Rate  22 H  01/16/19 13:04


 


Blood Pressure  115/57 L  01/16/19 12:00


 


O2 Sat by Pulse Oximetry (%)  99   01/16/19 09:00











Constitutional: Yes: No Distress


HENT: Yes: Atraumatic


Neck: Yes: Supple


Cardiovascular: Yes: Regular Rate and Rhythm


Respiratory: Yes: CTA Bilaterally


Gastrointestinal: Yes: Normal Bowel Sounds


Extremities: Yes: WNL


Edema: No


Neurological: Yes: Alert, Oriented


Labs: 


 CBC, BMP





 01/16/19 14:30 





 01/16/19 05:30 





 INR, PTT











INR  1.20  (0.83-1.09)  H  01/16/19  14:30    














Problem List





- Problems


(1) Rectal bleeding


Assessment/Plan: 


another episode of bleeding per rectum today


npo


ivf





Code(s): K62.5 - HEMORRHAGE OF ANUS AND RECTUM   





(2) Benign essential HTN


Code(s): I10 - ESSENTIAL (PRIMARY) HYPERTENSION   





(3) Anemia


Assessment/Plan: 


s/p prbc transfusion...total 10 units





Code(s): D64.9 - ANEMIA, UNSPECIFIED   


Qualifiers: 


   Anemia type: other cause   Other causes of anemia: acute posthemorrhagic   

Qualified Code(s): D62 - Acute posthemorrhagic anemia   





(4) Colitis


Code(s): K52.9 - NONINFECTIVE GASTROENTERITIS AND COLITIS, UNSPECIFIED   





(5) Inguinal hernia bilateral, non-recurrent


Assessment/Plan: 


d/w wit dr silva for surgical options, she will talk to other surgeons and let 

us know


Code(s): K40.20 - BI INGUINAL HERNIA, W/O OBST OR GANGRENE, NOT SPCF AS RECUR   


Qualifiers: 


   Obstruction and gangrene presence: without obstruction or gangrene   

Recurrence: non-recurrent   Qualified Code(s): K40.20 - Bilateral inguinal 

hernia, without obstruction or gangrene, not specified as recurrent   





(6) Schizophrenia


Assessment/Plan: 


on med


Code(s): F20.9 - SCHIZOPHRENIA, UNSPECIFIED   


Qualifiers: 


   Schizophrenia type: unspecified   Qualified Code(s): F20.9 - Schizophrenia, 

unspecified   





(7) Hypertension


Assessment/Plan: 


monitor bp


Code(s): I10 - ESSENTIAL (PRIMARY) HYPERTENSION   


Qualifiers: 


   Hypertension type: essential hypertension   Qualified Code(s): I10 - 

Essential (primary) hypertension   





Assessment/Plan





cc time 35 min

## 2019-01-16 NOTE — PN
Progress Note, Physician


History of Present Illness: 


Recurrent hematochezia without abd pain, receiving pRBC and FFP, bleeding scan 

localizes to possible distal colon, no localization with abd/pelvic CTA, or 

colonoscopy.





- Current Medication List


Current Medications: 


Active Medications





Chlorhexidine Gluconate (Hibiclens For Decolonization -)  1 applic TP HS [facility]


   Last Admin: 01/15/19 21:30 Dose:  1 applic


Metronidazole (Flagyl 500mg Premixed Ivpb -)  500 mg in 100 mls @ 100 mls/hr 

IVPB Q8H-IV [facility]


   Last Admin: 01/16/19 11:34 Dose:  100 mls/hr


Levofloxacin (Levaquin 750 Mg Premixed Ivpb -)  750 mg in 150 mls @ 100 mls/hr 

IVPB DAILY [facility]; Protocol


   Last Admin: 01/16/19 10:40 Dose:  100 mls/hr


Sodium Chloride (Normal Saline -)  1,000 mls @ 50 mls/hr IV [facility]


   Last Admin: 01/15/19 07:08 Dose:  50 mls/hr


Dextrose/Sodium Chloride (D5-Ns -)  1,000 mls @ 100 mls/hr IV [facility]


Mupirocin (Bactroban Ointment (For Decolonization) -)  1 applic NS BID [facility]


   Stop: 01/18/19 21:59


   Last Admin: 01/15/19 10:35 Dose:  1 applic


Olanzapine (Zyprexa -)  5 mg PO HS [facility]


   Last Admin: 01/15/19 21:30 Dose:  5 mg


Pantoprazole Sodium (Protonix Iv)  40 mg IVPUSH DAILY [facility]


   Last Admin: 01/16/19 09:41 Dose:  40 mg











- Objective


Vital Signs: 


 Vital Signs











Temperature  98.9 F   01/16/19 13:04


 


Pulse Rate  92 H  01/16/19 13:04


 


Respiratory Rate  22 H  01/16/19 13:04


 


Blood Pressure  115/57 L  01/16/19 12:00


 


O2 Sat by Pulse Oximetry (%)  99   01/16/19 09:00











Constitutional: Yes: No Distress, Calm


Neck: Yes: Supple


Cardiovascular: Yes: Regular Rate and Rhythm


Respiratory: Yes: Regular, Diminished, On Nasal O2


Gastrointestinal: Yes: Soft, Hypoactive Bowel Sounds, Rectal Bleeding


Edema: No


Labs: 


 CBC, BMP





 01/16/19 05:30 





 01/16/19 05:30 





 INR, PTT











INR  1.17  (0.83-1.09)  H  01/14/19  05:15    














- ....Imaging


EKG: Report Reviewed (NSR @ 84 inferior, anterolateral TWI)





Problem List





- Problems


(1) Inguinal hernia bilateral, non-recurrent


Code(s): K40.20 - BI INGUINAL HERNIA, W/O OBST OR GANGRENE, NOT SPCF AS RECUR   


Qualifiers: 


   Obstruction and gangrene presence: without obstruction or gangrene   

Recurrence: non-recurrent   Qualified Code(s): K40.20 - Bilateral inguinal 

hernia, without obstruction or gangrene, not specified as recurrent   





(2) Rectal bleeding


Code(s): K62.5 - HEMORRHAGE OF ANUS AND RECTUM   





(3) Anemia


Code(s): D64.9 - ANEMIA, UNSPECIFIED   


Qualifiers: 


   Anemia type: other cause   Other causes of anemia: acute posthemorrhagic   

Qualified Code(s): D62 - Acute posthemorrhagic anemia   





Assessment/Plan


01/15/2019 Echo: Mild-mod cLVH with hyperdynamic LVEF 74%, normal RV size and 

fxn, tr MR





1. Hematochezia persistent - possible distal colon bleeding source.


2. Acute anemia, referable to above


3. Sinus tachycardia referable to above


4. Abnormal EKG, ischemic T-wave abnormality, asymptomatic


5. Hyperglycemia





PLAN:





1. Transfuse to maintain Hgb equal or > 8.0


2. Follow CBC post transfusion


3. 


Future MPI study is recommended as outpatient for further evaluation of the 

above noted EKG abnormality

## 2019-01-16 NOTE — PN
Physical Exam: 


SUBJECTIVE: Patient seen and examined


NO bloody BM over night 


received 2 units of PRBCS over night and one in AM 


had on residual bloody BM black on clots, will transfuse 2 FFP , and monitor H/

H denies nay headache, blurry vision, SOB. 








OBJECTIVE:





 Vital Signs











 Period  Temp  Pulse  Resp  BP Sys/Dinero  Pulse Ox


 


 Last 24 Hr  98.0 F-99.9 F    18-22  106-133/  











GENERAL: AAOx3 in AND 


HEAD: NC/AT 


EYES: PERRL, EOMI,


ENT: MMM 


NECK: Trachea midline, full range of motion, supple. 


LUNGS: Breath sounds equal, clear to auscultation bilaterally, no wheezes, no 

crackles, no 


accessory muscle use. 


HEART: sinus tachy , S1, S2 without murmur, rub or gallop.


ABDOMEN: Soft, lower abdominal tenderness , non distended, normo active bowel 

sounds, no guarding, no rebound, b/l big  inguinal hernia 


EXTREMITIES: 2+ pulses, warm, well-perfused, no edema. 


NEUROLOGICAL: no focal deficit . Normal speech, gait not 


observed.


PSYCH: Normal mood, normal affect.


SKIN: Warm, dry, normal turgor, no rashes or lesions noted














 Laboratory Results - last 24 hr











  01/14/19 01/15/19 01/16/19





  05:15 18:36 05:30


 


WBC   9.3  9.0


 


RBC   2.18 L  2.54 L


 


Hgb   6.6 L*  7.9 L


 


Hct   19.2 L  22.5 L D


 


MCV   88.3  88.6


 


MCH   30.5  31.2


 


MCHC   34.6  35.2


 


RDW   16.0 H  16.8 H


 


Plt Count   269  233


 


MPV   7.5  7.4 L


 


Absolute Neuts (auto)    6.5


 


Neutrophils %    72.8


 


Lymphocytes %    15.4


 


Monocytes %    9.6


 


Eosinophils %    1.9  D


 


Basophils %    0.3


 


Nucleated RBC %    1 H


 


Sodium   


 


Potassium   


 


Chloride   


 


Carbon Dioxide   


 


Anion Gap   


 


BUN   


 


Creatinine   


 


Creat Clearance w eGFR   


 


Random Glucose   


 


Calcium   


 


Phosphorus   


 


Magnesium   


 


Total Bilirubin   


 


AST   


 


ALT   


 


Alkaline Phosphatase   


 


Troponin I   


 


Total Protein   


 


Albumin   


 


Blood Type  O POSITIVE  


 


Antibody Screen  Negative  


 


Crossmatch  See Detail  














  01/16/19





  05:30


 


WBC 


 


RBC 


 


Hgb 


 


Hct 


 


MCV 


 


MCH 


 


MCHC 


 


RDW 


 


Plt Count 


 


MPV 


 


Absolute Neuts (auto) 


 


Neutrophils % 


 


Lymphocytes % 


 


Monocytes % 


 


Eosinophils % 


 


Basophils % 


 


Nucleated RBC % 


 


Sodium  143


 


Potassium  4.1


 


Chloride  116 H


 


Carbon Dioxide  24


 


Anion Gap  3 L


 


BUN  6 L


 


Creatinine  1.3


 


Creat Clearance w eGFR  57.10


 


Random Glucose  119 H


 


Calcium  7.5 L


 


Phosphorus  3.6


 


Magnesium  2.0


 


Total Bilirubin  0.5


 


AST  8 L


 


ALT  9 L


 


Alkaline Phosphatase  36 L


 


Troponin I  0.09 H


 


Total Protein  4.4 L


 


Albumin  2.0 L


 


Blood Type 


 


Antibody Screen 


 


Crossmatch 








Active Medications











Generic Name Dose Route Start Last Admin





  Trade Name Parag  PRN Reason Stop Dose Admin


 


Chlorhexidine Gluconate  1 applic  01/13/19 22:00  01/15/19 21:30





  Hibiclens For Decolonization -  TP   1 applic





  HS [facility]   Administration





     





     





     





     


 


Metronidazole  500 mg in 100 mls @ 100 mls/hr  01/14/19 02:30  01/16/19 11:34





  Flagyl 500mg Premixed Ivpb -  IVPB   100 mls/hr





  Q8H-IV [facility]   Administration





     





     





     





     


 


Levofloxacin  750 mg in 150 mls @ 100 mls/hr  01/14/19 03:15  01/16/19 10:40





  Levaquin 750 Mg Premixed Ivpb -  IVPB   100 mls/hr





  DAILY [facility]   Administration





     





     





  Protocol   





     


 


Dextrose/Sodium Chloride  1,000 mls @ 100 mls/hr  01/16/19 11:45  01/16/19 13:24





  D5-Ns -  IV   100 mls/hr





  ASDIR [facility]   Administration





     





     





     





     


 


Mupirocin  1 applic  01/13/19 22:00  01/15/19 10:35





  Bactroban Ointment (For Decolonization) -  NS  01/18/19 21:59  1 applic





  BID [facility]   Administration





     





     





     





     


 


Olanzapine  5 mg  01/15/19 22:00  01/15/19 21:30





  Zyprexa -  PO   5 mg





  HS [facility]   Administration





     





     





     





     


 


Pantoprazole Sodium  40 mg  01/15/19 14:15  01/16/19 09:41





  Protonix Iv  IVPUSH   40 mg





  DAILY [facility]   Administration





     





     





     





     











ASSESSMENT/PLAN:


58 year old female, with a significant PMH of HTN, HLD, DM, and hepatitis C (

noncompliant with all of his prescribed medications), who presents to the 

hospital for 2 days history of bright red blood per rectum admitted to ICU for 

further evaluation 





# Neuro


* AAox3 in NAD 


* schizophrenia , stable resume home meds 


# Lungs: 


* CTA B/L no tachypnea or hypoxia 


# GI


- Lower GI bleed 


* 3 bloody BM yesterday , nothing over night , one this AM around 9 black with 

clots 


* S/P EGD/colonoscopy with internal hemorrhoids and diverticulosis no colitis 


* S/P 9 units of PRBCS , HGb improved from 5.6 to 7.6....7..7.9 this AM  will 

transfuse one more unit and 2 FFP 


* monitor H/H Q 6 hr, PTT, INR 


* CTA  negative for source of bleeding as well as  bleeding scan , suspected 

distal colon 


* bleeding scan negative 


* surgery on board and IR (spoke with kyleigh team Diane and mentioned no IR 

intervention at this point will proceed with bleeding scan )


* maintain 2 large IV Bores maintain 


* cont NPO 


* monitor H/H  Q 6hr 


* transfuse threshold below 8 


* Calcium gluconate for multiple transfusion 


# Hepc 


* F/o out pt 


#B/L inguinal hernia 


* Surgery on board 


* 


# CV 


* ischemic changes on EKG with no sysmptoms 


* trop trend 


* cardiology recommendation appreciated 





# 


* UA negative 


* BUN/Cr 9/1.3....7/1.2  [facility] 


* monitor lytes 


# FEN 


* F: no standing fluids 


* Emonitor lytes 


* N: npo for now 





# Proph 


* DVTS : SCDS , no chemical proph 


* GI : protonix  IV 40 daily 


# Dispo 


* monitor in ICU 





# code 


* full code 





Visit type





- Emergency Visit


Emergency Visit: Yes


ED Registration Date: 01/09/19


Care time: The patient presented to the Emergency Department on the above date 

and was hospitalized for further evaluation of their emergent condition.





- New Patient


This patient is new to me today: No





- Critical Care


Critical Care patient: Yes


Total Critical Care Time (in minutes): 45


Critical Care Statement: The care of this patient involved high complexity 

decision making to prevent further life threatening deterioration of the patient

's condition and/or to evaluate & treat vital organ system(s) failure or risk 

of failure.

## 2019-01-16 NOTE — PN
Teaching Attending Note


Name of Resident: Beverly Desai





ATTENDING PHYSICIAN STATEMENT





I saw and evaluated the patient.


I reviewed the resident's note and discussed the case with the resident.


I agree with the resident's findings and plan as documented.








SUBJECTIVE:





Pt seen and examined in the ICU. Had bloody bowel movement this AM with dark 

red blood. Being transfused PRBC. c/o lower abdominal pain.





OBJECTIVE:





 Vital Signs











 Period  Temp  Pulse  Resp  BP Sys/Dinero  Pulse Ox


 


 Last 24 Hr  98 F-99.9 F    18-22  106-133/  








 Intake & Output











 01/13/19 01/14/19 01/15/19 01/16/19





 23:59 23:59 23:59 23:59


 


Intake Total 4750 4210 1650 1100


 


Output Total  2450 1550 100


 


Balance 4750 8322 [id number] 9064


 


Weight  106.594 kg 109.2 kg 109.5 kg








Gen:  NAD at rest


Heart: RRR


Lung: decreased breath sounds at the bases


Abd: soft, mild TTP below umbilicus and LLQ, no rebound


Ext: no edema





 CBC, BMP





 01/16/19 05:30 





 01/16/19 05:30 





Active Medications





Chlorhexidine Gluconate (Hibiclens For Decolonization -)  1 applic TP HS [facility]


   Last Admin: 01/15/19 21:30 Dose:  1 applic


Metronidazole (Flagyl 500mg Premixed Ivpb -)  500 mg in 100 mls @ 100 mls/hr 

IVPB Q8H-IV [facility]


   Last Admin: 01/16/19 11:34 Dose:  100 mls/hr


Levofloxacin (Levaquin 750 Mg Premixed Ivpb -)  750 mg in 150 mls @ 100 mls/hr 

IVPB DAILY [facility]; Protocol


   Last Admin: 01/16/19 10:40 Dose:  100 mls/hr


Sodium Chloride (Normal Saline -)  1,000 mls @ 50 mls/hr IV ASDIR [facility]


   Last Admin: 01/15/19 07:08 Dose:  50 mls/hr


Dextrose/Sodium Chloride (D5-Ns -)  1,000 mls @ 100 mls/hr IV ASDIR [facility]


Mupirocin (Bactroban Ointment (For Decolonization) -)  1 applic NS BID [facility]


   Stop: 01/18/19 21:59


   Last Admin: 01/15/19 10:35 Dose:  1 applic


Olanzapine (Zyprexa -)  5 mg PO HS [facility]


   Last Admin: 01/15/19 21:30 Dose:  5 mg


Pantoprazole Sodium (Protonix Iv)  40 mg IVPUSH DAILY [facility]


   Last Admin: 01/16/19 09:41 Dose:  40 mg








ASSESSMENT AND PLAN:


Acute GI Bleed 


Acute Blood Loss Anemia


EKG changes


Hep C 


Bilateral Inguinal Hernias


Schizophrenia





-  monitor H/H


-  transfuse as needed


-  transfuse FFP


-  monitor calcium


-  on empiric antibiotics


-  echocardiogram


-  check cardiac enzymes


-  will need cardiac work up when stable


-  protonix


-  DVT prophylaxis


-  continue ICU monitoring








critical care time spent in reviewing chart, evaluating patient and formulating 

plan 35 min

## 2019-01-17 LAB
ALBUMIN SERPL-MCNC: 1.8 G/DL (ref 3.4–5)
ALP SERPL-CCNC: 33 U/L (ref 45–117)
ALT SERPL-CCNC: 9 U/L (ref 13–61)
ANION GAP SERPL CALC-SCNC: 10 MMOL/L (ref 8–16)
APTT BLD: 32.6 SECONDS (ref 25.2–36.5)
AST SERPL-CCNC: 10 U/L (ref 15–37)
BASOPHILS # BLD: 0.1 % (ref 0–2)
BASOPHILS # BLD: 0.2 % (ref 0–2)
BILIRUB SERPL-MCNC: 0.3 MG/DL (ref 0.2–1)
BUN SERPL-MCNC: 6 MG/DL (ref 7–18)
CALCIUM SERPL-MCNC: 6.7 MG/DL (ref 8.5–10.1)
CHLORIDE SERPL-SCNC: 117 MMOL/L (ref 98–107)
CO2 SERPL-SCNC: 20 MMOL/L (ref 21–32)
CREAT SERPL-MCNC: 1.3 MG/DL (ref 0.55–1.3)
DEPRECATED RDW RBC AUTO: 15.3 % (ref 11.9–15.9)
DEPRECATED RDW RBC AUTO: 15.8 % (ref 11.9–15.9)
DEPRECATED RDW RBC AUTO: 16.8 % (ref 11.9–15.9)
EOSINOPHIL # BLD: 1.8 % (ref 0–4.5)
EOSINOPHIL # BLD: 2.6 % (ref 0–4.5)
GLUCOSE SERPL-MCNC: 149 MG/DL (ref 74–106)
HCT VFR BLD CALC: 19.2 % (ref 35.4–49)
HCT VFR BLD CALC: 21.3 % (ref 35.4–49)
HCT VFR BLD CALC: 24.2 % (ref 35.4–49)
HGB BLD-MCNC: 6.8 GM/DL (ref 11.7–16.9)
HGB BLD-MCNC: 7.5 GM/DL (ref 11.7–16.9)
HGB BLD-MCNC: 8.5 GM/DL (ref 11.7–16.9)
INR BLD: 1.26 (ref 0.83–1.09)
LYMPHOCYTES # BLD: 18.2 % (ref 8–40)
LYMPHOCYTES # BLD: 21.6 % (ref 8–40)
MAGNESIUM SERPL-MCNC: 1.5 MG/DL (ref 1.8–2.4)
MCH RBC QN AUTO: 30.5 PG (ref 25.7–33.7)
MCH RBC QN AUTO: 30.5 PG (ref 25.7–33.7)
MCH RBC QN AUTO: 30.8 PG (ref 25.7–33.7)
MCHC RBC AUTO-ENTMCNC: 35.2 G/DL (ref 32–35.9)
MCHC RBC AUTO-ENTMCNC: 35.3 G/DL (ref 32–35.9)
MCHC RBC AUTO-ENTMCNC: 35.3 G/DL (ref 32–35.9)
MCV RBC: 86.4 FL (ref 80–96)
MCV RBC: 86.7 FL (ref 80–96)
MCV RBC: 87.3 FL (ref 80–96)
MONOCYTES # BLD AUTO: 7.5 % (ref 3.8–10.2)
MONOCYTES # BLD AUTO: 8.1 % (ref 3.8–10.2)
NEUTROPHILS # BLD: 67.5 % (ref 42.8–82.8)
NEUTROPHILS # BLD: 72.4 % (ref 42.8–82.8)
PHOSPHATE SERPL-MCNC: 3.4 MG/DL (ref 2.5–4.9)
PLATELET # BLD AUTO: 202 K/MM3 (ref 134–434)
PLATELET # BLD AUTO: 239 K/MM3 (ref 134–434)
PLATELET # BLD AUTO: 240 K/MM3 (ref 134–434)
PMV BLD: 7.2 FL (ref 7.5–11.1)
PMV BLD: 7.4 FL (ref 7.5–11.1)
PMV BLD: 7.6 FL (ref 7.5–11.1)
POTASSIUM SERPLBLD-SCNC: 3.4 MMOL/L (ref 3.5–5.1)
PROT SERPL-MCNC: 3.8 G/DL (ref 6.4–8.2)
PT PNL PPP: 14.9 SEC (ref 9.7–13)
RBC # BLD AUTO: 2.2 M/MM3 (ref 4–5.6)
RBC # BLD AUTO: 2.46 M/MM3 (ref 4–5.6)
RBC # BLD AUTO: 2.8 M/MM3 (ref 4–5.6)
SODIUM SERPL-SCNC: 147 MMOL/L (ref 136–145)
WBC # BLD AUTO: 7.6 K/MM3 (ref 4–10)
WBC # BLD AUTO: 8 K/MM3 (ref 4–10)
WBC # BLD AUTO: 8.3 K/MM3 (ref 4–10)

## 2019-01-17 PROCEDURE — 0DJD8ZZ INSPECTION OF LOWER INTESTINAL TRACT, VIA NATURAL OR ARTIFICIAL OPENING ENDOSCOPIC: ICD-10-PCS | Performed by: INTERNAL MEDICINE

## 2019-01-17 PROCEDURE — 0DJ08ZZ INSPECTION OF UPPER INTESTINAL TRACT, VIA NATURAL OR ARTIFICIAL OPENING ENDOSCOPIC: ICD-10-PCS | Performed by: INTERNAL MEDICINE

## 2019-01-17 RX ADMIN — MUPIROCIN SCH APPLIC: 20 OINTMENT TOPICAL at 09:00

## 2019-01-17 RX ADMIN — CHLORHEXIDINE GLUCONATE SCH: 213 SOLUTION TOPICAL at 23:02

## 2019-01-17 RX ADMIN — MUPIROCIN SCH APPLIC: 20 OINTMENT TOPICAL at 22:06

## 2019-01-17 RX ADMIN — PANTOPRAZOLE SODIUM SCH MG: 40 INJECTION, POWDER, FOR SOLUTION INTRAVENOUS at 08:26

## 2019-01-17 RX ADMIN — DEXTROSE AND SODIUM CHLORIDE SCH MLS/HR: 5; 900 INJECTION, SOLUTION INTRAVENOUS at 17:14

## 2019-01-17 RX ADMIN — PANTOPRAZOLE SODIUM SCH: 40 INJECTION, POWDER, FOR SOLUTION INTRAVENOUS at 15:48

## 2019-01-17 NOTE — PN
Physical Exam: 


SUBJECTIVE: Patient seen and examined this morning. Overnight patient 

experienced 3 blood BM's, was transfused 1 unit pRBC, underwent CTAngio A/P did 

not reveal a source. Patient underwent EGD this AM. Denies any chest pain, SOB, 

weakness, palpitations, nausea, vomiting.








OBJECTIVE:





 Vital Signs











 Period  Temp  Pulse  Resp  BP Sys/Dinero  Pulse Ox


 


 Last 24 Hr  98 F-98.7 F    17-98  /49-79  100-100











GENERAL: A&Ox3, NAD


HEAD: NCAT


EYES: PERRL, EOMI


ENT: moist mucous membranes


NECK: Supple


LUNGS: CTA b/l, no wheezes


HEART: Regular rate and rhythm, S1, S2 without murmur


ABDOMEN: Soft, nontender, nondistended, + bowel sounds, no guarding


EXTREMITIES: 2+ pulses, no edema. 


NEUROLOGICAL: Cranial nerves II through XII grossly intact. Normal speech


SKIN: Warm, dry








 Laboratory Results - last 24 hr











  01/13/19 01/14/19 01/16/19





  16:55 05:15 19:15


 


WBC    8.8


 


RBC    2.89 L


 


Hgb    8.7 L


 


Hct    25.2 L


 


MCV    87.1


 


MCH    30.1


 


MCHC    34.6


 


RDW    16.8 H


 


Plt Count    262


 


MPV    7.2 L


 


Absolute Neuts (auto)   


 


Neutrophils %   


 


Lymphocytes %   


 


Monocytes %   


 


Eosinophils %   


 


Basophils %   


 


Nucleated RBC %   


 


PT with INR   


 


INR   


 


PTT (Actin FS)   


 


Sodium   


 


Potassium   


 


Chloride   


 


Carbon Dioxide   


 


Anion Gap   


 


BUN   


 


Creatinine   


 


Creat Clearance w eGFR   


 


Random Glucose   


 


Calcium   


 


Phosphorus   


 


Magnesium   


 


Total Bilirubin   


 


AST   


 


ALT   


 


Alkaline Phosphatase   


 


Total Protein   


 


Albumin   


 


Blood Type  O POSITIVE  O POSITIVE 


 


Antibody Screen  Negative  Negative 


 


Crossmatch  See Detail  See Detail 














  01/16/19 01/17/19 01/17/19





  22:32 05:30 05:30


 


WBC  9.7  7.6 


 


RBC  2.50 L  2.20 L 


 


Hgb  7.6 L  6.8 L* 


 


Hct  21.3 L D  19.2 L 


 


MCV  85.2  87.3 


 


MCH  30.3  30.8 


 


MCHC  35.5  35.3 


 


RDW  16.9 H  16.8 H 


 


Plt Count  263  202  D 


 


MPV  7.7  7.4 L 


 


Absolute Neuts (auto)  7.0  


 


Neutrophils %  71.6  


 


Lymphocytes %  17.0  


 


Monocytes %  8.6  


 


Eosinophils %  2.5  


 


Basophils %  0.3  


 


Nucleated RBC %  0  


 


PT with INR   


 


INR   


 


PTT (Actin FS)   


 


Sodium    147 H


 


Potassium    3.4 L


 


Chloride    117 H


 


Carbon Dioxide    20 L


 


Anion Gap    10


 


BUN    6 L


 


Creatinine    1.3


 


Creat Clearance w eGFR    57.10


 


Random Glucose    149 H


 


Calcium    6.7 L*


 


Phosphorus    3.4


 


Magnesium    1.5 L


 


Total Bilirubin    0.3


 


AST    10 L


 


ALT    9 L


 


Alkaline Phosphatase    33 L


 


Total Protein    3.8 L


 


Albumin    1.8 L


 


Blood Type   


 


Antibody Screen   


 


Crossmatch   














  01/17/19 01/17/19 01/17/19





  07:12 13:00 13:00


 


WBC   8.3 


 


RBC   2.46 L 


 


Hgb   7.5 L 


 


Hct   21.3 L 


 


MCV   86.4 


 


MCH   30.5 


 


MCHC   35.3 


 


RDW   15.8 


 


Plt Count   239 


 


MPV   7.6 


 


Absolute Neuts (auto)   6.0 


 


Neutrophils %   72.4 


 


Lymphocytes %   18.2 


 


Monocytes %   7.5 


 


Eosinophils %   1.8 


 


Basophils %   0.1 


 


Nucleated RBC %   0 


 


PT with INR    14.90 H


 


INR    1.26 H


 


PTT (Actin FS)    32.6


 


Sodium   


 


Potassium   


 


Chloride   


 


Carbon Dioxide   


 


Anion Gap   


 


BUN   


 


Creatinine   


 


Creat Clearance w eGFR   


 


Random Glucose   


 


Calcium   


 


Phosphorus   


 


Magnesium   


 


Total Bilirubin   


 


AST   


 


ALT   


 


Alkaline Phosphatase   


 


Total Protein   


 


Albumin   


 


Blood Type  O POSITIVE  


 


Antibody Screen  Negative  


 


Crossmatch  See Detail  











 Microbiology





01/11/19 17:15   Urine - Urine Clean Catch   Urine Culture - Final


                            NO GROWTH OBTAINED








Active Medications





Chlorhexidine Gluconate (Hibiclens For Decolonization -)  1 applic TP HS [facility]


   Last Admin: 01/16/19 22:30 Dose:  1 applic


Dextrose/Sodium Chloride (D5-Ns -)  1,000 mls @ 100 mls/hr IV ASDIR [facility]


   Last Admin: 01/17/19 17:14 Dose:  100 mls/hr


Mupirocin (Bactroban Ointment (For Decolonization) -)  1 applic NS BID [facility]


   Stop: 01/18/19 21:59


   Last Admin: 01/17/19 09:00 Dose:  1 applic


Olanzapine (Zyprexa -)  5 mg PO HS [facility]


   Last Admin: 01/16/19 22:29 Dose:  5 mg


Pantoprazole Sodium (Protonix Iv)  40 mg IVPUSH DAILY [facility]


   Last Admin: 01/17/19 15:48 Dose:  Not Given








ASSESSMENT/PLAN:


57 y/o M with PMHx HTN, HLD, DM, Hepatitis C (noncompliant) who was admitted to 

ICU with BRBPR s/p Multiple transfusions





#GI


BRBPR with unclear etiology, likely lower GI 


-Overnight 3 bloody BMs 


-S/P EGD/colonoscopy, found to have internal hemorrhoids, diverticulosis, no 

colitis, mild gastritis


-S/P 13 units of PRBCS, 2 units FFP; Hgb responded appropriately


-Continue to monitor H/H q6h, PTT, INR 


-CTA (1/17): No definite bleeding site can be localized. Overall or has been no 

definite interval change in comparison to a prior CT study of 1/15/2019.


-Bleeding scan: No definite scintigraphic evidence of active GI bleeding. 


-GI (Dr. Yeager) Consulted, appreciate rec's


-General Surgery (Dr. Abdalla) Consulted, appreciate rec's


-Continue to maintain 2 large bore IV's


-Keep NPO


-Monitor CBC Q6H


-Transfusion threshold hgb < 8.0


-Given Calcium gluconate 1g 


Hx of Hep C (noncompliant) 


-F/o out pt 


B/L inguinal hernia 


-Surgery on board 





#Cardio


Elevated troponin, likely demand


Hx of HTN


-EKG: NSR, T WAVE ABNORMALITY, CONSIDER INFERIOR ISCHEMIA, CONSIDER 

ANTEROLATERAL ISCHEMIA, VR 84, QTc 479


-ECHO: Mild to moderate concentric LVH, EF = 74%, 


-Cardiology (Dr. Brock) consulted, appreciate rec's 


-Troponin 0.15-->0.09





#Neuro


Hx of Schizophrenia


-Stable, Continue home dose Zyprexa





#Pulmonary


-Stable, No active issues





#Endocrine


DM


-Stable, patient currently NPO and on D5NS


-Continue to monitor for elevated BG





#FEN 


-D5NS @ 100 mls/hr


-Monitor and replete lytes


-NPO





#PPx


-DVT: SCDS


-GI: Protonix 





Dispo: monitor in ICU 


code status: full code 











Visit type





- Emergency Visit


Emergency Visit: Yes


ED Registration Date: 01/09/19


Care time: The patient presented to the Emergency Department on the above date 

and was hospitalized for further evaluation of their emergent condition.





- New Patient


This patient is new to me today: Yes


Date on this admission: 01/17/19





- Critical Care


Critical Care patient: Yes


Total Critical Care Time (in minutes): 36


Critical Care Statement: The care of this patient involved high complexity 

decision making to prevent further life threatening deterioration of the patient

's condition and/or to evaluate & treat vital organ system(s) failure or risk 

of failure.

## 2019-01-17 NOTE — PN
Progress Note, Physician


History of Present Illness: 





getting prbc...unit no.13 today





- Current Medication List


Current Medications: 


Active Medications





Chlorhexidine Gluconate (Hibiclens For Decolonization -)  1 applic TP HS [facility]


   Last Admin: 01/16/19 22:30 Dose:  1 applic


Dextrose/Sodium Chloride (D5-Ns -)  1,000 mls @ 100 mls/hr IV ASDIR [facility]


   Last Admin: 01/16/19 13:24 Dose:  100 mls/hr


Mupirocin (Bactroban Ointment (For Decolonization) -)  1 applic NS BID [facility]


   Stop: 01/18/19 21:59


   Last Admin: 01/17/19 09:00 Dose:  1 applic


Olanzapine (Zyprexa -)  5 mg PO HS [facility]


   Last Admin: 01/16/19 22:29 Dose:  5 mg


Pantoprazole Sodium (Protonix Iv)  40 mg IVPUSH DAILY [facility]


   Last Admin: 01/17/19 15:48 Dose:  Not Given











- Objective


Vital Signs: 


 Vital Signs











Temperature  98.7 F   01/17/19 13:37


 


Pulse Rate  101 H  01/17/19 13:37


 


Respiratory Rate  20   01/17/19 13:37


 


Blood Pressure  116/54 L  01/17/19 13:37


 


O2 Sat by Pulse Oximetry (%)  100   01/17/19 12:55











HENT: Yes: Atraumatic


Neck: Yes: Supple


Cardiovascular: Yes: Regular Rate and Rhythm


Respiratory: Yes: CTA Bilaterally


Gastrointestinal: Yes: Normal Bowel Sounds


Extremities: Yes: WNL


Edema: No


Labs: 


 CBC, BMP





 01/17/19 13:00 





 01/17/19 05:30 





 INR, PTT











INR  1.26  (0.83-1.09)  H  01/17/19  13:00    














Problem List





- Problems


(1) Rectal bleeding


Assessment/Plan: 


another episode of bleeding per rectum today


npo


ivf


getting blood


gi workup no bleeding site seen so far


egd done today


supportive measures


patient has no chest pain or abdominal pain


Code(s): K62.5 - HEMORRHAGE OF ANUS AND RECTUM   





(2) Benign essential HTN


Code(s): I10 - ESSENTIAL (PRIMARY) HYPERTENSION   





(3) Anemia


Assessment/Plan: 


s/p prbc transfusion...total 13 units today





fu cbc


check echo and troponins


cardiology is on board


Code(s): D64.9 - ANEMIA, UNSPECIFIED   


Qualifiers: 


   Anemia type: other cause   Other causes of anemia: acute posthemorrhagic   

Qualified Code(s): D62 - Acute posthemorrhagic anemia   





(4) Colitis


Code(s): K52.9 - NONINFECTIVE GASTROENTERITIS AND COLITIS, UNSPECIFIED   





(5) Inguinal hernia bilateral, non-recurrent


Assessment/Plan: 


surgery does not want to pursue taht at this point


Code(s): K40.20 - BI INGUINAL HERNIA, W/O OBST OR GANGRENE, NOT SPCF AS RECUR   


Qualifiers: 


   Obstruction and gangrene presence: without obstruction or gangrene   

Recurrence: non-recurrent   Qualified Code(s): K40.20 - Bilateral inguinal 

hernia, without obstruction or gangrene, not specified as recurrent   





(6) Schizophrenia


Assessment/Plan: 


on med


will get psych eval for competency


Code(s): F20.9 - SCHIZOPHRENIA, UNSPECIFIED   


Qualifiers: 


   Schizophrenia type: unspecified   Qualified Code(s): F20.9 - Schizophrenia, 

unspecified   





(7) Hypertension


Assessment/Plan: 


monitor bp


Code(s): I10 - ESSENTIAL (PRIMARY) HYPERTENSION   


Qualifiers: 


   Hypertension type: essential hypertension   Qualified Code(s): I10 - 

Essential (primary) hypertension

## 2019-01-17 NOTE — PN
Teaching Attending Note


Name of Resident: Beverly Desai





ATTENDING PHYSICIAN STATEMENT





I saw and evaluated the patient.


I reviewed the resident's note and discussed the case with the resident.


I agree with the resident's findings and plan as documented.








SUBJECTIVE:


Patient seen and examined in the ICU.  Awake and alert. 


Receiving additional pRBC transfusion as his H&H again dropped.  


Repeat Endoscopy did not reveal site of bleeding. 





  


 Intake & Output











 01/14/19 01/15/19 01/16/19 01/17/19





 23:59 23:59 23:59 23:59


 


Intake Total 4210 1650 3232 1551


 


Output Total 2450 1550 940 


 


Balance 0219 199 6012 1551


 


Weight 235 lb 240 lb 11.916 oz 241 lb 6.499 oz 238 lb








 Last Vital Signs











Temp Pulse Resp BP Pulse Ox


 


 98.1 F   110 H  19   100/58 L  100 


 


 01/17/19 12:25  01/17/19 12:42  01/17/19 12:42  01/17/19 12:42  01/17/19 12:42








Active Medications





Chlorhexidine Gluconate (Hibiclens For Decolonization -)  1 applic TP HS [facility]


   Last Admin: 01/16/19 22:30 Dose:  1 applic


Metronidazole (Flagyl 500mg Premixed Ivpb -)  500 mg in 100 mls @ 100 mls/hr 

IVPB Q8H-IV [facility]


   Last Admin: 01/17/19 09:27 Dose:  100 mls/hr


Levofloxacin (Levaquin 750 Mg Premixed Ivpb -)  750 mg in 150 mls @ 100 mls/hr 

IVPB DAILY [facility]; Protocol


   Last Admin: 01/17/19 10:32 Dose:  100 mls/hr


Dextrose/Sodium Chloride (D5-Ns -)  1,000 mls @ 100 mls/hr IV ASDIR [facility]


   Last Admin: 01/16/19 13:24 Dose:  100 mls/hr


Mupirocin (Bactroban Ointment (For Decolonization) -)  1 applic NS BID [facility]


   Stop: 01/18/19 21:59


   Last Admin: 01/16/19 22:30 Dose:  1 applic


Olanzapine (Zyprexa -)  5 mg PO HS [facility]


   Last Admin: 01/16/19 22:29 Dose:  5 mg


Pantoprazole Sodium (Protonix Iv)  40 mg IVPUSH DAILY [facility]


   Last Admin: 01/17/19 08:26 Dose:  40 mg











Constitutional: Yes: No Distress, Calm


Eyes: Yes: Conjunctiva Clear, EOM Intact


HENT: Yes: Atraumatic, Normocephalic


Cardiovascular: Yes: Regular Rate and Rhythm, S1, S2.  No: Murmur


Respiratory: Yes: CTA Bilaterally


Gastrointestinal: Yes: Normal Bowel Sounds, Abdomen, Obese, Hernia, Rectal 

Bleeding.  No: Tenderness, Vomiting


Edema: No


Neurological: Yes: Alert, Oriented


Labs: 


  


 Laboratory Results - last 24 hr











  01/13/19 01/14/19 01/16/19





  16:55 05:15 14:30


 


WBC    9.0


 


RBC    2.88 L


 


Hgb    8.9 L


 


Hct    24.9 L


 


MCV    86.6


 


MCH    30.8


 


MCHC    35.5


 


RDW    17.3 H


 


Plt Count    278


 


MPV    7.6


 


Absolute Neuts (auto)   


 


Neutrophils %   


 


Lymphocytes %   


 


Monocytes %   


 


Eosinophils %   


 


Basophils %   


 


Nucleated RBC %   


 


PT with INR   


 


INR   


 


PTT (Actin FS)   


 


Sodium   


 


Potassium   


 


Chloride   


 


Carbon Dioxide   


 


Anion Gap   


 


BUN   


 


Creatinine   


 


Creat Clearance w eGFR   


 


Random Glucose   


 


Calcium   


 


Phosphorus   


 


Magnesium   


 


Total Bilirubin   


 


AST   


 


ALT   


 


Alkaline Phosphatase   


 


Total Protein   


 


Albumin   


 


Blood Type  O POSITIVE  O POSITIVE 


 


Antibody Screen  Negative  Negative 


 


Crossmatch  See Detail  See Detail 














  01/16/19 01/16/19 01/16/19





  14:30 19:15 22:32


 


WBC   8.8  9.7


 


RBC   2.89 L  2.50 L


 


Hgb   8.7 L  7.6 L


 


Hct   25.2 L  21.3 L D


 


MCV   87.1  85.2


 


MCH   30.1  30.3


 


MCHC   34.6  35.5


 


RDW   16.8 H  16.9 H


 


Plt Count   262  263


 


MPV   7.2 L  7.7


 


Absolute Neuts (auto)    7.0


 


Neutrophils %    71.6


 


Lymphocytes %    17.0


 


Monocytes %    8.6


 


Eosinophils %    2.5


 


Basophils %    0.3


 


Nucleated RBC %    0


 


PT with INR  14.20 H  


 


INR  1.20 H  


 


PTT (Actin FS)  31.8  


 


Sodium   


 


Potassium   


 


Chloride   


 


Carbon Dioxide   


 


Anion Gap   


 


BUN   


 


Creatinine   


 


Creat Clearance w eGFR   


 


Random Glucose   


 


Calcium   


 


Phosphorus   


 


Magnesium   


 


Total Bilirubin   


 


AST   


 


ALT   


 


Alkaline Phosphatase   


 


Total Protein   


 


Albumin   


 


Blood Type   


 


Antibody Screen   


 


Crossmatch   














  01/17/19 01/17/19 01/17/19





  05:30 05:30 07:12


 


WBC  7.6  


 


RBC  2.20 L  


 


Hgb  6.8 L*  


 


Hct  19.2 L  


 


MCV  87.3  


 


MCH  30.8  


 


MCHC  35.3  


 


RDW  16.8 H  


 


Plt Count  202  D  


 


MPV  7.4 L  


 


Absolute Neuts (auto)   


 


Neutrophils %   


 


Lymphocytes %   


 


Monocytes %   


 


Eosinophils %   


 


Basophils %   


 


Nucleated RBC %   


 


PT with INR   


 


INR   


 


PTT (Actin FS)   


 


Sodium   147 H 


 


Potassium   3.4 L 


 


Chloride   117 H 


 


Carbon Dioxide   20 L 


 


Anion Gap   10 


 


BUN   6 L 


 


Creatinine   1.3 


 


Creat Clearance w eGFR   57.10 


 


Random Glucose   149 H 


 


Calcium   6.7 L* 


 


Phosphorus   3.4 


 


Magnesium   1.5 L 


 


Total Bilirubin   0.3 


 


AST   10 L 


 


ALT   9 L 


 


Alkaline Phosphatase   33 L 


 


Total Protein   3.8 L 


 


Albumin   1.8 L 


 


Blood Type    O POSITIVE


 


Antibody Screen    Negative


 


Crossmatch    See Detail














Assessment/Plan


LGIB due to diverticulosis (? and hemorrhoids): additional active bleed 

overnight requiring pRBCs 


(?) colitis 


Bilateral inguinal hernias 


Hepatitis C 


Schizophrenia 





Normal transfusion thresholds Hgb: 7 


Serial H & H 


O2 as needed


Follow I & O 


Will D/C empiric ABX 


Mechanical VTE prophylaxis 


Maintain 2 large bore IV access 


ICU monitoring until bleeding stabilizes 





Dr Samuels 


Critical care time spent in reviewing chart, evaluating patient and formulating 

plan - 36 minutes.

## 2019-01-17 NOTE — DS
Physical Examination


Vital Signs: 


 Vital Signs











Temperature  98.7 F   01/17/19 13:37


 


Pulse Rate  101 H  01/17/19 13:37


 


Respiratory Rate  20   01/17/19 13:37


 


Blood Pressure  116/54 L  01/17/19 13:37


 


O2 Sat by Pulse Oximetry (%)  100   01/17/19 12:55











Constitutional: Yes: No Distress


HENT: Yes: Atraumatic


Neck: Yes: Supple


Cardiovascular: Yes: Regular Rate and Rhythm


Respiratory: Yes: CTA Bilaterally


Gastrointestinal: Yes: Normal Bowel Sounds


Extremities: Yes: WNL


Edema: No


Peripheral Pulses WNL: Yes


Neurological: Yes: Alert, Oriented


Labs: 


 CBC, BMP





 01/17/19 17:58 





 01/17/19 05:30 











Discharge Summary


Reason For Visit: RECTAL HEMORRHAGE


Current Active Problems





Anemia (Acute)


Colitis (Acute)


Hypertension (Acute)


Inguinal hernia bilateral, non-recurrent (Acute)


Pre-procedural cardiovascular examination (Acute)


Rectal bleeding (Acute)


Schizophrenia (Acute)








Condition: Fair





- Instructions





- Home Medications


Comprehensive Discharge Medication List: 


Ambulatory Orders





ZyPREXA -  01/09/19 





[facility]

## 2019-01-17 NOTE — PN
Progress Note, Physician


History of Present Illness: 


Continued hematochezia without abd pain, received 10U pRBC and 2 U FFP, 

bleeding scan localizes to possible distal colon, no localization with repeat 

abd/pelvic CTA, or colonoscopy.  Plan for EGD as well as flex sig/limited 

colonoscopy today.





- Current Medication List


Current Medications: 


Active Medications





Chlorhexidine Gluconate (Hibiclens For Decolonization -)  1 applic TP HS [facility]


   Last Admin: 01/16/19 22:30 Dose:  1 applic


Metronidazole (Flagyl 500mg Premixed Ivpb -)  500 mg in 100 mls @ 100 mls/hr 

IVPB Q8H-IV [facility]


   Last Admin: 01/17/19 09:27 Dose:  100 mls/hr


Levofloxacin (Levaquin 750 Mg Premixed Ivpb -)  750 mg in 150 mls @ 100 mls/hr 

IVPB DAILY [facility]; Protocol


   Last Admin: 01/17/19 10:32 Dose:  100 mls/hr


Dextrose/Sodium Chloride (D5-Ns -)  1,000 mls @ 100 mls/hr IV ASDIR [facility]


   Last Admin: 01/16/19 13:24 Dose:  100 mls/hr


Mupirocin (Bactroban Ointment (For Decolonization) -)  1 applic NS BID [facility]


   Stop: 01/18/19 21:59


   Last Admin: 01/16/19 22:30 Dose:  1 applic


Olanzapine (Zyprexa -)  5 mg PO HS [facility]


   Last Admin: 01/16/19 22:29 Dose:  5 mg


Pantoprazole Sodium (Protonix Iv)  40 mg IVPUSH DAILY [facility]


   Last Admin: 01/17/19 08:26 Dose:  40 mg











- Objective


Vital Signs: 


 Vital Signs











Temperature  98.3 F   01/17/19 10:00


 


Pulse Rate  102 H  01/17/19 10:00


 


Respiratory Rate  20   01/17/19 10:00


 


Blood Pressure  100/79   01/17/19 10:00


 


O2 Sat by Pulse Oximetry (%)  100   01/16/19 21:00











Constitutional: Yes: No Distress, Calm


Neck: Yes: Supple


Cardiovascular: Yes: Regular Rate and Rhythm


Respiratory: Yes: Regular, Diminished


Gastrointestinal: Yes: Soft, Hypoactive Bowel Sounds, Rectal Bleeding


Edema: No


Labs: 


 CBC, BMP





 01/17/19 05:30 





 01/17/19 05:30 





 INR, PTT











INR  1.20  (0.83-1.09)  H  01/16/19  14:30    














Problem List





- Problems


(1) Inguinal hernia bilateral, non-recurrent


Code(s): K40.20 - BI INGUINAL HERNIA, W/O OBST OR GANGRENE, NOT SPCF AS RECUR   


Qualifiers: 


   Obstruction and gangrene presence: without obstruction or gangrene   

Recurrence: non-recurrent   Qualified Code(s): K40.20 - Bilateral inguinal 

hernia, without obstruction or gangrene, not specified as recurrent   





(2) Rectal bleeding


Code(s): K62.5 - HEMORRHAGE OF ANUS AND RECTUM   





(3) Anemia


Code(s): D64.9 - ANEMIA, UNSPECIFIED   


Qualifiers: 


   Anemia type: other cause   Other causes of anemia: acute posthemorrhagic   

Qualified Code(s): D62 - Acute posthemorrhagic anemia   





Assessment/Plan


01/15/2019 Echo: Mild-mod cLVH with hyperdynamic LVEF 74%, normal RV size and 

fxn, tr MR





1. Hematochezia persistent - possible distal colon bleeding source suspected 

diverticular bleed.


2. Acute anemia, referable to above


3. Sinus tachycardia referable to above


4. Abnormal EKG, ischemic T-wave abnormality, asymptomatic


5. Hyperglycemia





PLAN:





1. Transfuse to maintain Hgb equal or > 8.0


2. Follow CBC post transfusion


3. Plan for EGD as well as flex sig/limited colonoscopy today


4. Future MPI study is recommended as outpatient for further evaluation of the 

above noted EKG abnormality

## 2019-01-17 NOTE — PN
Progress Note (short form)





- Note


Progress Note: 





EGD and repeat colonoscopy complete.  Reports left in physical chart and to be 

scanned into [employer]. 12 U PRBC requirement to date. 





Problem List





- Problems


(1) Rectal bleeding


Code(s): K62.5 - HEMORRHAGE OF ANUS AND RECTUM

## 2019-01-17 NOTE — PN
GI Progress Note


Subjective: 





Continued active bleeding overnight


Repeat CTA performed: negative


Hgb 6.8 in AM. Received another unite PRBC overnight (total 9)


Denies focal complaints





- Objective


Vital Signs: 


 Vital Signs











Temperature  98.9 F   01/16/19 13:04


 


Pulse Rate  95 H  01/17/19 02:00


 


Respiratory Rate  22 H  01/17/19 02:00


 


Blood Pressure  120/64   01/17/19 02:00


 


O2 Sat by Pulse Oximetry (%)  100   01/16/19 21:00











Constitutional: Calm


Eyes: No: Sclera Icterus


Respiratory: Yes: CTA Bilaterally


Gastrointestinal Inspection: Yes: Hernia (bilateral).  No: Distention


...Auscultate: Yes: Normoactive Bowel Sounds


...Palpate: No: Tenderness


...Percussion: No: Tympanitic


Labs: 


 CBC, BMP





 01/17/19 05:30 





 INR, PTT











INR  1.20  (0.83-1.09)  H  01/16/19  14:30    














Problem List





- Problems


(1) Rectal bleeding


Assessment/Plan: 


Continued rectal bleeding without definitive source localized. Suspected 

diverticular bleed however diverticula throughout the colon.


Plan for EGD as well as flex sig/limited colonoscopy if feasible


Surgery following


Continue support with PRBC's / Fluids as needed


NPO





Code(s): K62.5 - HEMORRHAGE OF ANUS AND RECTUM

## 2019-01-17 NOTE — PN
Progress Note, Physician


History of Present Illness: 


Pt with LGIB and CT showing thickened right colon, with bilateral inguinal 

hernias containing colon. In ICU for continued bleeding from rectum with Hb 

drops. He has gotten total 12 units PRBC through today, just finishing unit 13 

now. Had bloody BMs overnight and repeat CTA did not localize bleeding. 

Colonoscopy Monday (with my assistance), visualized to cecum and whole colon, 

with findings of diverticulosis but no colitis or mass, few small polyps (not 

resected) and hemorrhoids. There was old blood present, but no active bleeding. 

He had repeat colonoscopy today with EGD, with findings of old blood throughout 

colon and diverticulosis, but no active bleeding source. Bilious secretions in/

from TI, EGD negative except for hiatal hernia. Presumed diverticular bleeding, 

still not localized. At this point, colectomy needs to be discussed.





He is seen and examined in ICU bed, denies dizziness or abdominal pain. He has 

remained NPO. He is awake and alert. 





- Current Medication List


Current Medications: 


Active Medications





Chlorhexidine Gluconate (Hibiclens For Decolonization -)  1 applic TP HS [facility]


   Last Admin: 01/16/19 22:30 Dose:  1 applic


Dextrose/Sodium Chloride (D5-Ns -)  1,000 mls @ 100 mls/hr IV ASDIR [facility]


   Last Admin: 01/16/19 13:24 Dose:  100 mls/hr


Mupirocin (Bactroban Ointment (For Decolonization) -)  1 applic NS BID [facility]


   Stop: 01/18/19 21:59


   Last Admin: 01/16/19 22:30 Dose:  1 applic


Olanzapine (Zyprexa -)  5 mg PO HS [facility]


   Last Admin: 01/16/19 22:29 Dose:  5 mg


Pantoprazole Sodium (Protonix Iv)  40 mg IVPUSH DAILY [facility]


   Last Admin: 01/17/19 08:26 Dose:  40 mg











- Objective


Vital Signs: 


 Vital Signs











Temperature  98.7 F   01/17/19 13:37


 


Pulse Rate  101 H  01/17/19 13:37


 


Respiratory Rate  20   01/17/19 13:37


 


Blood Pressure  116/54 L  01/17/19 13:37


 


O2 Sat by Pulse Oximetry (%)  100   01/17/19 12:55











Constitutional: Yes: Well Nourished, No Distress, Calm


Eyes: Yes: Conjunctiva Clear, EOM Intact


HENT: Yes: Atraumatic, Normocephalic


Respiratory: Yes: Regular, On Nasal O2


Gastrointestinal: Yes: Soft, Hernia (bilateral inguinal presumed direct hernias

, right not fully reducible, left with less content than previously, mostly 

reducible).  No: Tenderness


...Rectal Exam: Yes: Deferred


Extremities: No: Cool, Cyanosis


Integumentary: No: Jaundice, Rash


Neurological: Yes: Alert, Oriented


Labs: 


 CBC, BMP





 01/17/19 13:00 





 01/17/19 05:30 





 INR, PTT











INR  1.26  (0.83-1.09)  H  01/17/19  13:00    














- ....Imaging


Cat Scan: Report Reviewed (repeat CTA with no active bleeding noted)


Other: Report Reviewed, Image Reviewed (endoscopy report and pictures reviewed 

- see hpi)





Problem List





- Problems


(1) Rectal bleeding


Assessment/Plan: 


presumed diverticular bleeding, though none active at scope or by repeated 

imaging


still with intermittent dark bloody BMs, tachycardia


s/p transfusion 13 units total PRBC





NPO/IVF


trend H/H q6H


keep T&S up to date - hold 2 units for OR





at this point, subtotal colectomy seems to be indicated. 


Discussed with patient risks, benefits and alternatives of laparotomy with 

subtotal, possible partial, colectomy and possible ostomy, including but not 

limited to bleeding, infection, injury to adjacent structures, anastomotic leak 

or bowel injury, intraabdominal abscess, incisional hernia, need for further 

procedures, death; alternatives include delayed or no surgery - risks of this 

include continued bleeding and death.


Patient agreeable to proceed with operation - will take to OR emergently for 

above. Informed consent signed for same.





discussed with ICU Dr. Hughes and informed Dr. Calzada





This patient is critically ill. Time spent reviewing chart, examining patient, 

talking with providers and/or family and documentation is 45 minutes.


Code(s): K62.5 - HEMORRHAGE OF ANUS AND RECTUM   





(2) Anemia


Code(s): D64.9 - ANEMIA, UNSPECIFIED   


Qualifiers: 


   Anemia type: other cause   Other causes of anemia: acute posthemorrhagic   

Qualified Code(s): D62 - Acute posthemorrhagic anemia   





(3) Inguinal hernia bilateral, non-recurrent


Assessment/Plan: 


bilateral direct inguinal - left with cecum and TI, right had sigmoid, but it 

is now inside abdomen 


will not address definitive hernia repair at this time


Code(s): K40.20 - BI INGUINAL HERNIA, W/O OBST OR GANGRENE, NOT SPCF AS RECUR   


Qualifiers: 


   Obstruction and gangrene presence: without obstruction or gangrene   

Recurrence: non-recurrent   Qualified Code(s): K40.20 - Bilateral inguinal 

hernia, without obstruction or gangrene, not specified as recurrent   





(4) Hypertension


Code(s): I10 - ESSENTIAL (PRIMARY) HYPERTENSION   


Qualifiers: 


   Hypertension type: essential hypertension   Qualified Code(s): I10 - 

Essential (primary) hypertension   





(5) Schizophrenia


Assessment/Plan: 


on zyprexa 


note on chart indicates he takes depakote 500mg 4 pills hs as well as some 

inhalers


will be NPO postop until bowel function resumes


Code(s): F20.9 - SCHIZOPHRENIA, UNSPECIFIED   


Qualifiers: 


   Schizophrenia type: unspecified   Qualified Code(s): F20.9 - Schizophrenia, 

unspecified

## 2019-01-18 VITALS — TEMPERATURE: 98.6 F

## 2019-01-18 VITALS — SYSTOLIC BLOOD PRESSURE: 132 MMHG | DIASTOLIC BLOOD PRESSURE: 83 MMHG

## 2019-01-18 VITALS — HEART RATE: 88 BPM

## 2019-01-18 LAB
ALBUMIN SERPL-MCNC: 1.9 G/DL (ref 3.4–5)
ALP SERPL-CCNC: 34 U/L (ref 45–117)
ALT SERPL-CCNC: 8 U/L (ref 13–61)
ANION GAP SERPL CALC-SCNC: 7 MMOL/L (ref 8–16)
AST SERPL-CCNC: 7 U/L (ref 15–37)
BASOPHILS # BLD: 0.2 % (ref 0–2)
BASOPHILS # BLD: 0.2 % (ref 0–2)
BILIRUB SERPL-MCNC: 0.3 MG/DL (ref 0.2–1)
BUN SERPL-MCNC: 6 MG/DL (ref 7–18)
CALCIUM SERPL-MCNC: 7.1 MG/DL (ref 8.5–10.1)
CHLORIDE SERPL-SCNC: 115 MMOL/L (ref 98–107)
CO2 SERPL-SCNC: 24 MMOL/L (ref 21–32)
CREAT SERPL-MCNC: 1.2 MG/DL (ref 0.55–1.3)
DEPRECATED RDW RBC AUTO: 15.8 % (ref 11.9–15.9)
DEPRECATED RDW RBC AUTO: 15.9 % (ref 11.9–15.9)
EOSINOPHIL # BLD: 3.4 % (ref 0–4.5)
EOSINOPHIL # BLD: 4.1 % (ref 0–4.5)
GLUCOSE SERPL-MCNC: 158 MG/DL (ref 74–106)
HCT VFR BLD CALC: 25.5 % (ref 35.4–49)
HCT VFR BLD CALC: 25.8 % (ref 35.4–49)
HGB BLD-MCNC: 9.1 GM/DL (ref 11.7–16.9)
HGB BLD-MCNC: 9.4 GM/DL (ref 11.7–16.9)
LYMPHOCYTES # BLD: 21.9 % (ref 8–40)
LYMPHOCYTES # BLD: 22.9 % (ref 8–40)
MAGNESIUM SERPL-MCNC: 1.6 MG/DL (ref 1.8–2.4)
MCH RBC QN AUTO: 31.1 PG (ref 25.7–33.7)
MCH RBC QN AUTO: 31.5 PG (ref 25.7–33.7)
MCHC RBC AUTO-ENTMCNC: 35.8 G/DL (ref 32–35.9)
MCHC RBC AUTO-ENTMCNC: 36.3 G/DL (ref 32–35.9)
MCV RBC: 86.7 FL (ref 80–96)
MCV RBC: 86.9 FL (ref 80–96)
MONOCYTES # BLD AUTO: 6.4 % (ref 3.8–10.2)
MONOCYTES # BLD AUTO: 6.6 % (ref 3.8–10.2)
NEUTROPHILS # BLD: 66.9 % (ref 42.8–82.8)
NEUTROPHILS # BLD: 67.4 % (ref 42.8–82.8)
PHOSPHATE SERPL-MCNC: 4 MG/DL (ref 2.5–4.9)
PLATELET # BLD AUTO: 220 K/MM3 (ref 134–434)
PLATELET # BLD AUTO: 233 K/MM3 (ref 134–434)
PMV BLD: 7.2 FL (ref 7.5–11.1)
PMV BLD: 7.6 FL (ref 7.5–11.1)
POTASSIUM SERPLBLD-SCNC: 3.9 MMOL/L (ref 3.5–5.1)
PROT SERPL-MCNC: 4.2 G/DL (ref 6.4–8.2)
RBC # BLD AUTO: 2.93 M/MM3 (ref 4–5.6)
RBC # BLD AUTO: 2.97 M/MM3 (ref 4–5.6)
SODIUM SERPL-SCNC: 147 MMOL/L (ref 136–145)
WBC # BLD AUTO: 8.2 K/MM3 (ref 4–10)
WBC # BLD AUTO: 8.5 K/MM3 (ref 4–10)

## 2019-01-18 RX ADMIN — DEXTROSE AND SODIUM CHLORIDE SCH MLS/HR: 5; 900 INJECTION, SOLUTION INTRAVENOUS at 14:38

## 2019-01-18 RX ADMIN — PANTOPRAZOLE SODIUM SCH MG: 40 INJECTION, POWDER, FOR SOLUTION INTRAVENOUS at 09:30

## 2019-01-18 RX ADMIN — MUPIROCIN SCH APPLIC: 20 OINTMENT TOPICAL at 09:30

## 2019-01-18 NOTE — DS
Physical Examination


Vital Signs: 


 Vital Signs











Temperature  98.6 F   01/18/19 14:00


 


Pulse Rate  88   01/18/19 14:00


 


Respiratory Rate  16   01/18/19 14:00


 


Blood Pressure  132/83   01/18/19 12:00


 


O2 Sat by Pulse Oximetry (%)  95   01/18/19 09:00











HENT: Yes: Atraumatic


Neck: Yes: Supple


Cardiovascular: Yes: Regular Rate and Rhythm


Respiratory: Yes: CTA Bilaterally


Gastrointestinal: Yes: Normal Bowel Sounds


Extremities: Yes: WNL


Edema: No


Peripheral Pulses WNL: Yes


Neurological: Yes: Alert, Oriented


Labs: 


 CBC, BMP





 01/18/19 05:30 





 01/18/19 05:30 











Discharge Summary


Reason For Visit: RECTAL HEMORRHAGE


Condition: Fair





- Instructions


Disposition: TRANSFER ACUTE CARE/OTHER HOSP





- Home Medications


Comprehensive Discharge Medication List: 


Ambulatory Orders





ZyPREXA -  01/09/19 





TRANSFER ACUTE CARE

## 2019-01-18 NOTE — PN
Physical Exam: 


SUBJECTIVE: Patient seen and examined this morning. No Bloody BM's overnight. 








OBJECTIVE:





 Vital Signs











 Period  Temp  Pulse  Resp  BP Sys/Dinero  Pulse Ox


 


 Last 24 Hr  97.8 F-98.9 F      114-132/61-83  93-95











GENERAL: A&Ox3, NAD


HEAD: NCAT


EYES: PERRL, EOMI


ENT: moist mucous membranes


NECK: Supple


LUNGS: CTA b/l, no wheezes


HEART: Regular rate and rhythm, S1, S2 without murmur


ABDOMEN: Soft, nontender, nondistended, + bowel sounds, no guarding


EXTREMITIES: 2+ pulses, no edema. 


SKIN: Warm, dry








 Laboratory Results - last 24 hr











  01/14/19 01/17/19 01/17/19





  05:15 07:12 17:58


 


WBC    8.0


 


RBC    2.80 L


 


Hgb    8.5 L


 


Hct    24.2 L


 


MCV    86.7


 


MCH    30.5


 


MCHC    35.2


 


RDW    15.3


 


Plt Count    240


 


MPV    7.2 L


 


Absolute Neuts (auto)    5.4


 


Neutrophils %    67.5


 


Lymphocytes %    21.6


 


Monocytes %    8.1


 


Eosinophils %    2.6


 


Basophils %    0.2


 


Nucleated RBC %    0


 


Sodium   


 


Potassium   


 


Chloride   


 


Carbon Dioxide   


 


Anion Gap   


 


BUN   


 


Creatinine   


 


Creat Clearance w eGFR   


 


Random Glucose   


 


Calcium   


 


Phosphorus   


 


Magnesium   


 


Total Bilirubin   


 


AST   


 


ALT   


 


Alkaline Phosphatase   


 


Creatine Kinase   


 


Troponin I   


 


Total Protein   


 


Albumin   


 


Blood Type  O POSITIVE  O POSITIVE 


 


Antibody Screen  Negative  Negative 


 


Crossmatch  See Detail  See Detail 














  01/17/19 01/18/19 01/18/19





  18:20 00:23 05:30


 


WBC   8.2  8.5


 


RBC   2.93 L  2.97 L


 


Hgb   9.1 L  9.4 L


 


Hct   25.5 L  25.8 L


 


MCV   86.9  86.7


 


MCH   31.1  31.5


 


MCHC   35.8  36.3 H


 


RDW   15.9  15.8


 


Plt Count   233  220


 


MPV   7.6  7.2 L


 


Absolute Neuts (auto)   5.5  5.7


 


Neutrophils %   66.9  67.4


 


Lymphocytes %   22.9  21.9


 


Monocytes %   6.6  6.4


 


Eosinophils %   3.4  4.1


 


Basophils %   0.2  0.2


 


Nucleated RBC %   0  0


 


Sodium   


 


Potassium   


 


Chloride   


 


Carbon Dioxide   


 


Anion Gap   


 


BUN   


 


Creatinine   


 


Creat Clearance w eGFR   


 


Random Glucose   


 


Calcium   


 


Phosphorus   


 


Magnesium   


 


Total Bilirubin   


 


AST   


 


ALT   


 


Alkaline Phosphatase   


 


Creatine Kinase  71  


 


Troponin I  0.10 H  


 


Total Protein   


 


Albumin   


 


Blood Type   


 


Antibody Screen   


 


Crossmatch   














  01/18/19





  05:30


 


WBC 


 


RBC 


 


Hgb 


 


Hct 


 


MCV 


 


MCH 


 


MCHC 


 


RDW 


 


Plt Count 


 


MPV 


 


Absolute Neuts (auto) 


 


Neutrophils % 


 


Lymphocytes % 


 


Monocytes % 


 


Eosinophils % 


 


Basophils % 


 


Nucleated RBC % 


 


Sodium  147 H


 


Potassium  3.9


 


Chloride  115 H


 


Carbon Dioxide  24


 


Anion Gap  7 L


 


BUN  6 L


 


Creatinine  1.2


 


Creat Clearance w eGFR  > 60


 


Random Glucose  158 H


 


Calcium  7.1 L


 


Phosphorus  4.0


 


Magnesium  1.6 L


 


Total Bilirubin  0.3


 


AST  7 L


 


ALT  8 L


 


Alkaline Phosphatase  34 L


 


Creatine Kinase 


 


Troponin I 


 


Total Protein  4.2 L


 


Albumin  1.9 L


 


Blood Type 


 


Antibody Screen 


 


Crossmatch 











 Microbiology





01/11/19 17:15   Urine - Urine Clean Catch   Urine Culture - Final


                            NO GROWTH OBTAINED








Active Medications





Chlorhexidine Gluconate (Hibiclens For Decolonization -)  1 applic TP HS [facility]


   Last Admin: 01/17/19 23:02 Dose:  Not Given


Dextrose/Sodium Chloride (D5-Ns -)  1,000 mls @ 100 mls/hr IV ASDIR [facility]


   Last Admin: 01/17/19 17:14 Dose:  100 mls/hr


Mupirocin (Bactroban Ointment (For Decolonization) -)  1 applic NS BID [facility]


   Stop: 01/18/19 21:59


   Last Admin: 01/18/19 09:30 Dose:  1 applic


Olanzapine (Zyprexa -)  5 mg PO HS [facility]


   Last Admin: 01/17/19 22:05 Dose:  5 mg


Pantoprazole Sodium (Protonix Iv)  40 mg IVPUSH DAILY [facility]


   Last Admin: 01/18/19 09:30 Dose:  40 mg








ASSESSMENT/PLAN:


59 y/o M with PMHx HTN, HLD, DM, Hepatitis C (noncompliant) who was admitted to 

ICU with BRBPR s/p Multiple transfusions





#GI


BRBPR with unclear etiology, likely lower GI 


-As per primary team and General surgery, Arrangements have been made to 

transfer the patient to [facility]


-S/P EGD/colonoscopy, found to have internal hemorrhoids, diverticulosis, no 

colitis, mild gastritis


-S/P 13 units of PRBCS, 2 units FFP; Hgb responded appropriately


-Continue to monitor H/H, PTT, INR 


-CTA (1/17): No definite bleeding site can be localized. Overall or has been no 

definite interval change in comparison to a prior CT study of 1/15/2019.


-Bleeding scan: No definite scintigraphic evidence of active GI bleeding. 


-GI (Dr. Yeager) Consulted, appreciate rec's


-General Surgery (Dr. Abdalla) Consulted, appreciate rec's


-Continue to maintain 2 large bore IV's


-Keep NPO


-Transfusion threshold hgb < 8.0


-Given Calcium gluconate 1g 


Hx of Hep C (noncompliant) 


-F/o out pt 


B/L inguinal hernia 


-Surgery on board 





#Cardio


Elevated troponin, likely demand


Hx of HTN


-EKG: NSR, T WAVE ABNORMALITY, CONSIDER INFERIOR ISCHEMIA, CONSIDER 

ANTEROLATERAL ISCHEMIA, VR 84, QTc 479


-ECHO: Mild to moderate concentric LVH, EF = 74%, 


-Cardiology (Dr. Brock) consulted, appreciate rec's 


-Troponin 0.15-->0.09





#Neuro


Hx of Schizophrenia


-Stable, Continue home dose Zyprexa





#Pulmonary


-Stable, No active issues





#Endocrine


DM


-Stable, patient currently NPO and on D5NS


-Continue to monitor for elevated BG





#FEN 


-D5NS @ 100 mls/hr


-Monitor and replete lytes


-NPO





#PPx


-DVT: SCDS


-GI: Protonix 





Dispo: monitor in ICU 


code status: full code











 





Visit type





- Emergency Visit


Emergency Visit: Yes


ED Registration Date: 01/09/19


Care time: The patient presented to the Emergency Department on the above date 

and was hospitalized for further evaluation of their emergent condition.





- New Patient


This patient is new to me today: Yes


Date on this admission: 01/18/19





- Critical Care


Critical Care patient: Yes


Total Critical Care Time (in minutes): 36


Critical Care Statement: The care of this patient involved high complexity 

decision making to prevent further life threatening deterioration of the patient

's condition and/or to evaluate & treat vital organ system(s) failure or risk 

of failure.

## 2019-01-18 NOTE — PN
Progress Note, Physician





- Current Medication List


Current Medications: 


Active Medications





Chlorhexidine Gluconate (Hibiclens For Decolonization -)  1 applic TP HS [facility]


   Last Admin: 01/17/19 23:02 Dose:  Not Given


Dextrose/Sodium Chloride (D5-Ns -)  1,000 mls @ 100 mls/hr IV ASDIR [facility]


   Last Admin: 01/18/19 14:38 Dose:  100 mls/hr


Mupirocin (Bactroban Ointment (For Decolonization) -)  1 applic NS BID [facility]


   Stop: 01/18/19 21:59


   Last Admin: 01/18/19 09:30 Dose:  1 applic


Olanzapine (Zyprexa -)  5 mg PO HS [facility]


   Last Admin: 01/17/19 22:05 Dose:  5 mg


Pantoprazole Sodium (Protonix Iv)  40 mg IVPUSH DAILY [facility]


   Last Admin: 01/18/19 09:30 Dose:  40 mg











- Objective


Vital Signs: 


 Vital Signs











Temperature  98.6 F   01/18/19 14:00


 


Pulse Rate  88   01/18/19 14:00


 


Respiratory Rate  16   01/18/19 14:00


 


Blood Pressure  132/83   01/18/19 12:00


 


O2 Sat by Pulse Oximetry (%)  95   01/18/19 09:00











Labs: 


 CBC, BMP





 01/18/19 05:30 





 01/18/19 05:30 





 INR, PTT











INR  1.26  (0.83-1.09)  H  01/17/19  13:00    














Problem List





- Problems


(1) Rectal bleeding


Code(s): K62.5 - HEMORRHAGE OF ANUS AND RECTUM   





(2) Benign essential HTN


Code(s): I10 - ESSENTIAL (PRIMARY) HYPERTENSION   





(3) Anemia


Code(s): D64.9 - ANEMIA, UNSPECIFIED   


Qualifiers: 


   Anemia type: other cause   Other causes of anemia: acute posthemorrhagic   

Qualified Code(s): D62 - Acute posthemorrhagic anemia   





(4) Colitis


Code(s): K52.9 - NONINFECTIVE GASTROENTERITIS AND COLITIS, UNSPECIFIED   





(5) Inguinal hernia bilateral, non-recurrent


Code(s): K40.20 - BI INGUINAL HERNIA, W/O OBST OR GANGRENE, NOT SPCF AS RECUR   


Qualifiers: 


   Obstruction and gangrene presence: without obstruction or gangrene   

Recurrence: non-recurrent   Qualified Code(s): K40.20 - Bilateral inguinal 

hernia, without obstruction or gangrene, not specified as recurrent   





(6) Schizophrenia


Code(s): F20.9 - SCHIZOPHRENIA, UNSPECIFIED   


Qualifiers: 


   Schizophrenia type: unspecified   Qualified Code(s): F20.9 - Schizophrenia, 

unspecified   





(7) Hypertension


Code(s): I10 - ESSENTIAL (PRIMARY) HYPERTENSION   


Qualifiers: 


   Hypertension type: essential hypertension   Qualified Code(s): I10 - 

Essential (primary) hypertension   





Assessment/Plan





awaiting transfer to tertiary [facility]

## 2019-01-18 NOTE — PN
Teaching Attending Note


Name of Resident: Beverly Desai





ATTENDING PHYSICIAN STATEMENT





I saw and evaluated the patient.


I reviewed the resident's note and discussed the case with the resident.


I agree with the resident's findings and plan as documented.








SUBJECTIVE:


Patient seen and examined in the ICU.  Awake and alert. 


Events from last night noted. 


OR held due to noted ischemic appearing changes on cardiac monitoring. 











  


 Intake & Output











 01/15/19 01/16/19 01/17/19 01/18/19





 23:59 23:59 23:59 23:59


 


Intake Total 1650 3232 3451 1200


 


Output Total 1550 940 350 700


 


Balance 100 2292 3101 500


 


Weight 240 lb 11.916 oz 241 lb 6.499 oz 238 lb 











 Last Vital Signs











Temp Pulse Resp BP Pulse Ox


 


 97.8 F   88   16   132/83   95 


 


 01/18/19 12:00  01/18/19 12:00  01/18/19 12:00  01/18/19 12:00  01/18/19 09:00








Active Medications





Chlorhexidine Gluconate (Hibiclens For Decolonization -)  1 applic TP HS [facility]


   Last Admin: 01/17/19 23:02 Dose:  Not Given


Dextrose/Sodium Chloride (D5-Ns -)  1,000 mls @ 100 mls/hr IV ASDIR [facility]


   Last Admin: 01/17/19 17:14 Dose:  100 mls/hr


Mupirocin (Bactroban Ointment (For Decolonization) -)  1 applic NS BID [facility]


   Stop: 01/18/19 21:59


   Last Admin: 01/18/19 09:30 Dose:  1 applic


Olanzapine (Zyprexa -)  5 mg PO HS [facility]


   Last Admin: 01/17/19 22:05 Dose:  5 mg


Pantoprazole Sodium (Protonix Iv)  40 mg IVPUSH DAILY [facility]


   Last Admin: 01/18/19 09:30 Dose:  40 mg











Constitutional: Yes: No Distress, Calm


Eyes: Yes: Conjunctiva Clear, EOM Intact


HENT: Yes: Atraumatic, Normocephalic


Cardiovascular: Yes: Regular Rate and Rhythm, S1, S2.  No: Murmur


Respiratory: Yes: CTA Bilaterally


Gastrointestinal: Yes: Normal Bowel Sounds, Abdomen, Obese, Hernia, Rectal 

Bleeding.  No: Tenderness, Vomiting


Edema: No


Neurological: Yes: Alert, Oriented


Labs: 


  Laboratory Results - last 24 hr











  01/14/19 01/17/19 01/17/19





  05:15 07:12 13:00


 


WBC   


 


RBC   


 


Hgb   


 


Hct   


 


MCV   


 


MCH   


 


MCHC   


 


RDW   


 


Plt Count   


 


MPV   


 


Absolute Neuts (auto)   


 


Neutrophils %   


 


Lymphocytes %   


 


Monocytes %   


 


Eosinophils %   


 


Basophils %   


 


Nucleated RBC %   


 


PT with INR    14.90 H


 


INR    1.26 H


 


PTT (Actin FS)    32.6


 


Sodium   


 


Potassium   


 


Chloride   


 


Carbon Dioxide   


 


Anion Gap   


 


BUN   


 


Creatinine   


 


Creat Clearance w eGFR   


 


Random Glucose   


 


Calcium   


 


Phosphorus   


 


Magnesium   


 


Total Bilirubin   


 


AST   


 


ALT   


 


Alkaline Phosphatase   


 


Creatine Kinase   


 


Troponin I   


 


Total Protein   


 


Albumin   


 


Blood Type  O POSITIVE  O POSITIVE 


 


Antibody Screen  Negative  Negative 


 


Crossmatch  See Detail  See Detail 














  01/17/19 01/17/19 01/18/19





  17:58 18:20 00:23


 


WBC  8.0   8.2


 


RBC  2.80 L   2.93 L


 


Hgb  8.5 L   9.1 L


 


Hct  24.2 L   25.5 L


 


MCV  86.7   86.9


 


MCH  30.5   31.1


 


MCHC  35.2   35.8


 


RDW  15.3   15.9


 


Plt Count  240   233


 


MPV  7.2 L   7.6


 


Absolute Neuts (auto)  5.4   5.5


 


Neutrophils %  67.5   66.9


 


Lymphocytes %  21.6   22.9


 


Monocytes %  8.1   6.6


 


Eosinophils %  2.6   3.4


 


Basophils %  0.2   0.2


 


Nucleated RBC %  0   0


 


PT with INR   


 


INR   


 


PTT (Actin FS)   


 


Sodium   


 


Potassium   


 


Chloride   


 


Carbon Dioxide   


 


Anion Gap   


 


BUN   


 


Creatinine   


 


Creat Clearance w eGFR   


 


Random Glucose   


 


Calcium   


 


Phosphorus   


 


Magnesium   


 


Total Bilirubin   


 


AST   


 


ALT   


 


Alkaline Phosphatase   


 


Creatine Kinase   71 


 


Troponin I   0.10 H 


 


Total Protein   


 


Albumin   


 


Blood Type   


 


Antibody Screen   


 


Crossmatch   














  01/18/19 01/18/19





  05:30 05:30


 


WBC  8.5 


 


RBC  2.97 L 


 


Hgb  9.4 L 


 


Hct  25.8 L 


 


MCV  86.7 


 


MCH  31.5 


 


MCHC  36.3 H 


 


RDW  15.8 


 


Plt Count  220 


 


MPV  7.2 L 


 


Absolute Neuts (auto)  5.7 


 


Neutrophils %  67.4 


 


Lymphocytes %  21.9 


 


Monocytes %  6.4 


 


Eosinophils %  4.1 


 


Basophils %  0.2 


 


Nucleated RBC %  0 


 


PT with INR  


 


INR  


 


PTT (Actin FS)  


 


Sodium   147 H


 


Potassium   3.9


 


Chloride   115 H


 


Carbon Dioxide   24


 


Anion Gap   7 L


 


BUN   6 L


 


Creatinine   1.2


 


Creat Clearance w eGFR   > 60


 


Random Glucose   158 H


 


Calcium   7.1 L


 


Phosphorus   4.0


 


Magnesium   1.6 L


 


Total Bilirubin   0.3


 


AST   7 L


 


ALT   8 L


 


Alkaline Phosphatase   34 L


 


Creatine Kinase  


 


Troponin I  


 


Total Protein   4.2 L


 


Albumin   1.9 L


 


Blood Type  


 


Antibody Screen  


 


Crossmatch  











Assessment/Plan


LGIB due to diverticulosis (? and hemorrhoids): additional active bleed 

overnight requiring pRBCs 


(?) colitis 


Bilateral inguinal hernias 


Hepatitis C 


Schizophrenia 





Note that arrangements have been made to transfer the patient to [facility]  


Normal transfusion thresholds Hgb: 7 


Serial H & H 


O2 as needed


Follow I & O 


Will D/C empiric ABX 


Mechanical VTE prophylaxis 


Maintain 2 large bore IV access 





Dr Samuels

## 2019-01-18 NOTE — ECHO
Version:  1



Name:  NHAN LEDEZMA                                Exam: Adult Echocardiogram

MRN:  [id number]                                       Study Date:  01/18/2019, 7:39 AM

Age:  58 Years



 ____________________________________________________________________________________________________
__________



MMode/2D Measurements & Calculations





IVSd: 1.43 cm                                          LVIDs: 2.6 cm

LVIDd: 5.0 cm

LVPWd: 1.63 cm

                                                       Ao root diam: 3.8 cm

                                                       LA dimension: 3.3 cm



Doppler Measurements & Calculations



MV E max gautam: 76.2 cm/sec                              Med E/e':  10.2

MV A max gautam: 93.6 cm/sec                              Med Peak E' Gautam:  7.5 cm/sec

MV E/A: 0.81

Lat E/e':  10.8

Lat Peak E' Gautam:  7.1 cm/sec



 Procedure

 The study was technically difficult with many images being suboptimal in quality.

 Left Ventricle

 There is moderate concentric left ventricular hypertrophy. Ejection Fraction = 55-60%. The transmitr
al

 spectral Doppler flow pattern is suggestive of impaired LV relaxation.

 Right Ventricle

 The right ventricle is normal in size and function.

 Atria

 Normal left and right atrial size and function.

 Mitral Valve

 The mitral valve is normal in structure and function. There is no mitral valve stenosis. There is mi
ld mitral

 regurgitation.

 Tricuspid Valve

 The tricuspid valve is normal in structure and function. There is mild tricuspid regurgitation.

 Aortic Valve

 The aortic valve opens well. No hemodynamically significant valvular aortic stenosis.

 Pulmonic Valve

 The pulmonic valve is not well seen, but is grossly normal. There is no pulmonic valvular stenosis. 
Mild

 pulmonic valvular regurgitation.

 Great Vessels

 Mild aortic root dilatation.

 Pericardium/Pleura

 There is no pericardial effusion.







 Summary Statements

 The study was technically difficult with many images being suboptimal in quality.

 There is moderate concentric left ventricular hypertrophy.

 Ejection Fraction = 55-60%.

 The transmitral spectral Doppler flow pattern is suggestive of impaired LV relaxation.

 The right ventricle is normal in size and function.

 There is mild mitral regurgitation.

 Mild aortic root dilatation.





                          MD Arroyo

                       *Jane                   01/18/2019, 10:36 AM

 Ordering Physician:  Luz Maria Calzada

 Performed By:  Emelyn Cohen

## 2019-01-18 NOTE — PN
Progress Note, Physician


History of Present Illness: 


Hematochezia without abd pain has stopped, Hgb stable post transfusion, EGD 

doesnt show bleeding source.





- Current Medication List


Current Medications: 


Active Medications





Chlorhexidine Gluconate (Hibiclens For Decolonization -)  1 applic TP HS [facility]


   Last Admin: 01/17/19 23:02 Dose:  Not Given


Dextrose/Sodium Chloride (D5-Ns -)  1,000 mls @ 100 mls/hr IV ASDIR [facility]


   Last Admin: 01/17/19 17:14 Dose:  100 mls/hr


Mupirocin (Bactroban Ointment (For Decolonization) -)  1 applic NS BID [facility]


   Stop: 01/18/19 21:59


   Last Admin: 01/18/19 09:30 Dose:  1 applic


Olanzapine (Zyprexa -)  5 mg PO HS [facility]


   Last Admin: 01/17/19 22:05 Dose:  5 mg


Pantoprazole Sodium (Protonix Iv)  40 mg IVPUSH DAILY [facility]


   Last Admin: 01/18/19 09:30 Dose:  40 mg











- Objective


Vital Signs: 


 Vital Signs











Temperature  97.8 F   01/18/19 12:00


 


Pulse Rate  88   01/18/19 12:00


 


Respiratory Rate  16   01/18/19 12:00


 


Blood Pressure  132/83   01/18/19 12:00


 


O2 Sat by Pulse Oximetry (%)  95   01/18/19 09:00











Constitutional: Yes: No Distress, Calm


Neck: Yes: Supple


Cardiovascular: Yes: Regular Rate and Rhythm


Respiratory: Yes: Regular, CTA Bilaterally


Gastrointestinal: Yes: Soft, Hypoactive Bowel Sounds, Rectal Bleeding


Edema: No


Labs: 


 CBC, BMP





 01/18/19 05:30 





 01/18/19 05:30 





 INR, PTT











INR  1.26  (0.83-1.09)  H  01/17/19  13:00    














- ....Imaging


EKG: Report Reviewed (Tele: NSR)





Problem List





- Problems


(1) Inguinal hernia bilateral, non-recurrent


Code(s): K40.20 - BI INGUINAL HERNIA, W/O OBST OR GANGRENE, NOT SPCF AS RECUR   


Qualifiers: 


   Obstruction and gangrene presence: without obstruction or gangrene   

Recurrence: non-recurrent   Qualified Code(s): K40.20 - Bilateral inguinal 

hernia, without obstruction or gangrene, not specified as recurrent   





(2) Rectal bleeding


Code(s): K62.5 - HEMORRHAGE OF ANUS AND RECTUM   





(3) Anemia


Code(s): D64.9 - ANEMIA, UNSPECIFIED   


Qualifiers: 


   Anemia type: other cause   Other causes of anemia: acute posthemorrhagic   

Qualified Code(s): D62 - Acute posthemorrhagic anemia   





Assessment/Plan


01/15/2019 Echo: Mild-mod cLVH with hyperdynamic LVEF 74%, normal RV size and 

fxn, tr MR


01/18/2019 Echo: Mod cLVH LVEF 55-60%, abnl LV compliance, mild MR





1. Hematochezia persistent - possible distal colon bleeding source suspected 

diverticular bleed.


2. Acute anemia, referable to above


3. Sinus tachycardia referable to above


4. Abnormal EKG, ischemic T-wave abnormality, asymptomatic


5. Hyperglycemia





PLAN:





1. Transfuse to maintain Hgb equal or > 8.0


2. Follow CBC post transfusion


3. Transfer to tertiary care center for subtotal colectomy


4. Future MPI study is recommended as outpatient for further evaluation of the 

above noted EKG abnormality

## 2020-09-11 ENCOUNTER — HOSPITAL ENCOUNTER (INPATIENT)
Dept: HOSPITAL 74 - JER | Age: 60
LOS: 11 days | Discharge: NURSING, REHAB OR LONG-TERM CARE | DRG: 464 | End: 2020-09-22
Attending: INTERNAL MEDICINE | Admitting: INTERNAL MEDICINE
Payer: COMMERCIAL

## 2020-09-11 VITALS — BODY MASS INDEX: 36 KG/M2

## 2020-09-11 DIAGNOSIS — F20.9: ICD-10-CM

## 2020-09-11 DIAGNOSIS — J98.11: ICD-10-CM

## 2020-09-11 DIAGNOSIS — E78.5: ICD-10-CM

## 2020-09-11 DIAGNOSIS — M53.2X6: ICD-10-CM

## 2020-09-11 DIAGNOSIS — N17.9: ICD-10-CM

## 2020-09-11 DIAGNOSIS — R50.82: ICD-10-CM

## 2020-09-11 DIAGNOSIS — M51.16: Primary | ICD-10-CM

## 2020-09-11 DIAGNOSIS — E11.65: ICD-10-CM

## 2020-09-11 DIAGNOSIS — I10: ICD-10-CM

## 2020-09-11 DIAGNOSIS — M21.371: ICD-10-CM

## 2020-09-11 DIAGNOSIS — M47.816: ICD-10-CM

## 2020-09-11 DIAGNOSIS — M48.061: ICD-10-CM

## 2020-09-11 DIAGNOSIS — G62.9: ICD-10-CM

## 2020-09-11 LAB
BASOPHILS # BLD: 0.5 % (ref 0–2)
DEPRECATED RDW RBC AUTO: 12.8 % (ref 11.9–15.9)
EOSINOPHIL # BLD: 0.6 % (ref 0–4.5)
HCT VFR BLD CALC: 36.5 % (ref 35.4–49)
HGB BLD-MCNC: 12.2 GM/DL (ref 11.7–16.9)
LYMPHOCYTES # BLD: 26.3 % (ref 8–40)
MCH RBC QN AUTO: 32.8 PG (ref 25.7–33.7)
MCHC RBC AUTO-ENTMCNC: 33.6 G/DL (ref 32–35.9)
MCV RBC: 97.7 FL (ref 80–96)
MONOCYTES # BLD AUTO: 8.7 % (ref 3.8–10.2)
NEUTROPHILS # BLD: 63.9 % (ref 42.8–82.8)
PLATELET # BLD AUTO: 163 K/MM3 (ref 134–434)
PMV BLD: 8.7 FL (ref 7.5–11.1)
RBC # BLD AUTO: 3.73 M/MM3 (ref 4–5.6)
WBC # BLD AUTO: 6.9 K/MM3 (ref 4–10)

## 2020-09-11 PROCEDURE — G0378 HOSPITAL OBSERVATION PER HR: HCPCS

## 2020-09-11 PROCEDURE — U0003 INFECTIOUS AGENT DETECTION BY NUCLEIC ACID (DNA OR RNA); SEVERE ACUTE RESPIRATORY SYNDROME CORONAVIRUS 2 (SARS-COV-2) (CORONAVIRUS DISEASE [COVID-19]), AMPLIFIED PROBE TECHNIQUE, MAKING USE OF HIGH THROUGHPUT TECHNOLOGIES AS DESCRIBED BY CMS-2020-01-R: HCPCS

## 2020-09-11 NOTE — PDOC
*Physical Exam





- Vital Signs


                                Last Vital Signs











Temp Pulse Resp BP Pulse Ox


 


 98.6 F   114 H  20   149/127 H  97 


 


 09/11/20 21:55  09/11/20 21:55  09/11/20 21:55  09/11/20 21:55  09/11/20 21:55














ED Treatment Course





- LABORATORY


CBC & Chemistry Diagram: 


                                 09/11/20 23:42





                                 09/11/20 23:42





- ADDITIONAL ORDERS


Additional order review: 


                                        











  09/11/20





  23:42


 


RBC  3.73 L


 


MCV  97.7 H


 


MCHC  33.6


 


RDW  12.8  D


 


MPV  8.7  D


 


Neutrophils %  63.9


 


Lymphocytes %  26.3  D


 


Monocytes %  8.7


 


Eosinophils %  0.6  D


 


Basophils %  0.5














- Medications


Given in the ED: 


ED Medications














Discontinued Medications














Generic Name Dose Route Start Last Admin





  Trade Name Freq  PRN Reason Stop Dose Admin


 


Ketorolac Tromethamine  30 mg  09/11/20 16:04  09/11/20 16:54





  Toradol Injection -  IM  09/11/20 16:05  30 mg





  ONCE ONE   Administration


 


Lidocaine  1 patch  09/11/20 16:04  09/11/20 16:54





  Lidoderm Patch -  TP  09/11/20 16:05  1 patch





  ONCE ONE   Administration


 


Lorazepam  0.5 mg  09/11/20 15:49  09/11/20 17:41





  Ativan Injection -  IVPUSH  09/11/20 15:50  0.5 mg





  ONCE ONE   Administration


 


Lorazepam  0.5 mg  09/11/20 18:39  09/11/20 19:47





  Ativan Injection -  IVPUSH  09/11/20 18:40  Not Given





  ONCE ONE  


 


Lorazepam  2 mg  09/11/20 19:37  09/11/20 20:09





  Ativan Injection -  IM  09/11/20 19:38  Not Given





  ONCE ONE  


 


Lorazepam  2 mg  09/11/20 20:01  09/11/20 19:35





  Ativan Injection -  IVPUSH  09/11/20 20:02  2 mg





  ONCE ONE   Administration














Medical Decision Making





- Medical Decision Making


Patient signed out by DOUG Jarvis





60yo M with PMH of schizophrenia, hypertension hyperlipidemia presented with 

behavioral health counselor with complaint of numbness to right lower extremity 

caused him to fall today.





MRI without cauda equina


Per nsgy, steroids ordered


Has nearly fallen three or four times while in the ER


Likely unsafe discharge





Pending labs


09/11/20 23:57





                                       CBC











WBC  6.9 K/mm3 (4.0-10.0)   09/11/20  23:42    


 


RBC  3.73 M/mm3 (4.00-5.60)  L  09/11/20  23:42    


 


Hgb  12.2 GM/dL (11.7-16.9)   09/11/20  23:42    


 


Hct  36.5 % (35.4-49)  D 09/11/20  23:42    


 


MCV  97.7 fl (80-96)  H  09/11/20  23:42    


 


MCH  32.8 pg (25.7-33.7)   09/11/20  23:42    


 


MCHC  33.6 g/dl (32.0-35.9)   09/11/20  23:42    


 


RDW  12.8 % (11.9-15.9)  D 09/11/20  23:42    


 


Plt Count  163 K/MM3 (134-434)  D 09/11/20  23:42    


 


MPV  8.7 fl (7.5-11.1)  D 09/11/20  23:42    


 


Absolute Neuts (auto)  4.4 K/mm3 (1.5-8.0)   09/11/20  23:42    


 


Neutrophils %  63.9 % (42.8-82.8)   09/11/20  23:42    


 


Lymphocytes %  26.3 % (8-40)  D 09/11/20  23:42    


 


Monocytes %  8.7 % (3.8-10.2)   09/11/20  23:42    


 


Eosinophils %  0.6 % (0-4.5)  D 09/11/20  23:42    


 


Basophils %  0.5 % (0-2.0)   09/11/20  23:42    


 


Nucleated RBC %  0 % (0-0)   09/11/20  23:42    








No leukocytosis or anemia





                                       CMP











Sodium  143 mmol/L (136-145)   09/11/20  23:42    


 


Potassium  4.0 mmol/L (3.5-5.1)   09/11/20  23:42    


 


Chloride  111 mmol/L ()  H  09/11/20  23:42    


 


Carbon Dioxide  23 mmol/L (21-32)   09/11/20  23:42    


 


Anion Gap  8 MMOL/L (8-16)   09/11/20  23:42    


 


BUN  17.4 mg/dL (7-18)   09/11/20  23:42    


 


Creatinine  1.2 mg/dL (0.55-1.3)   09/11/20  23:42    


 


Est GFR (CKD-EPI)AfAm  77.33   09/11/20  23:42    


 


Est GFR (CKD-EPI)NonAf  66.72   09/11/20  23:42    


 


Random Glucose  167 mg/dL ()  H  09/11/20  23:42    


 


Calcium  8.6 mg/dL (8.5-10.1)   09/11/20  23:42    


 


Total Bilirubin  0.2 mg/dL (0.2-1)   09/11/20  23:42    


 


AST  17 U/L (15-37)   09/11/20  23:42    


 


ALT  18 U/L (13-61)   09/11/20  23:42    


 


Alkaline Phosphatase  54 U/L ()   09/11/20  23:42    


 


Total Protein  7.0 g/dl (6.4-8.2)   09/11/20  23:42    


 


Albumin  3.4 g/dl (3.4-5.0)   09/11/20  23:42    








Electrolytes unremarkable


Cr normal


No transaminitis





Patient unable to ambulate





Discussed case with Dr. Marshall who accepted patient for med/surg observation 

under Dr. Amado


09/12/20 00:55





Discharge





- Discharge Information


Problems reviewed: Yes


Clinical Impression/Diagnosis: 


 Right foot drop, Impaired ambulation





Peripheral neuropathy


Qualifiers:


 Peripheral neuropathy type: idiopathic neuropathy, unspecified Qualified 

Code(s): G60.9 - Hereditary and idiopathic neuropathy, unspecified





Condition: Guarded





- Admission


Yes





- Follow up/Referral





- Patient Discharge Instructions





- Post Discharge Activity

## 2020-09-11 NOTE — PDOC
History of Present Illness





- General


Chief Complaint: Injury


Stated Complaint: FALL, numbness to right leg


Time Seen by Provider: 09/11/20 13:45


History Source: Patient


Exam Limitations: Clinical Condition





- History of Present Illness


Initial Comments: 





09/11/20 14:15


Patient with past medical history of schizophrenia, hypertension hyperlipidemia 

presented with behavioral health counselor with complaint of numbness to right 

lower extremity caused him to fall today.  Behavioral health counselor report 

patient was seen in [facility] 3 days ago for complaint of numbing 

sensation in right upper thigh and was advised he had muscle spasm but symptoms 

seem to be getting worse and staff reported patient has been dragging his right 

feet for the past 2 days.  Patient reported numbing sensation to right lower leg

and difficulty raising right leg.  Reported mild pain to right lower back.  

Denies saddle paresthesia, urinary or fecal incontinence.  Patient not on any 

medication for pain


Is this a multiple visit Asthma Patient?: No


Timing/Duration: other (3 days)





Past History





- Medical History


Allergies/Adverse Reactions: 


                                    Allergies











Allergy/AdvReac Type Severity Reaction Status Date / Time


 


No Known Allergies Allergy   Verified 09/11/20 13:05











Home Medications: 


Ambulatory Orders





ZyPREXA -  01/09/19 








Anemia: No


Asthma: No


Cancer: No


Cardiac Disorders: No


CVA: No


COPD: No


CHF: No


Dementia: No


Diabetes: Yes


GI Disorders: No


 Disorders: No


HTN: Yes


Hypercholesterolemia: Yes


Liver Disease: No


Psychiatric Problems: Yes (Schizophrenia)


Seizures: No


Thyroid Disease: No





- Immunization History


Immunization Up to Date: Yes





- Psycho-Social/Smoking History


Smoking History: Never smoked


Have you smoked in the past 12 months: Yes


Number of Cigarettes Smoked Daily: 20


Information on smoking cessation initiated: Yes





- Substance Abuse Hx (Audit-C & DAST Scrn)


How often the patient has a drink containing alcohol: Never


Score: In Men: 4 or > Positive; In Women: 3 or > Positive: 0


Screen Result (Pos requires Nsg. Audit-10AR): Negative


In the last yr the pt used illegal drug/Rx for NonMed reason: No


Score:  Yes response is considered Positive: 0


Screen Result (Positive result requires Nsg. DAST-10): Negative





**Review of Systems





- Review of Systems


Able to Perform ROS?: Yes


Is the patient limited English proficient: No


Constitutional: No: Chills, Fever, Malaise


HEENTM: No: Symptoms Reported, See HPI, Eye Pain, Blurred Vision, Tearing, 

Recent change in vision, Double Vision, Cataracts, Ear Pain, Ocular Prothesis, 

Ear Discharge, Nose Pain, Nose Congestion, Tinnitus, Nose Bleeding, Hearing 

Loss, Throat Pain, Throat Swelling, Mouth Pain, Dental Problems, Difficulty 

Swallowing, Mouth Swelling, Other


Respiratory: No: Symptoms reported, See HPI, Cough, Orthopnea, Shortness of 

Breath, SOB with Exertion, SOB at Rest, Stridor, Wheezing, Productive cough, 

Hemoptysis, Other


Cardiac (ROS): No: Symptoms Reported, See HPI, Chest Pain, Edema, Irregular 

Heart Rate, Lightheadedness, Palpitations, Syncope, Chest Tightness, Other


ABD/GI: No: Symptoms Reported, See HPI, Nausea, Vomiting


: No: Symptoms Reported, Incontinence


Musculoskeletal: Yes: Symptoms Reported, See HPI, Back Pain


Integumentary: No: Symptoms Reported


Neurological: Yes: Symptoms reported, See HPI, Numbness (RLE), Weakness (RLE), 

Unsteady Gait.  No: Headache, Dizziness


All Other Systems: Reviewed and Negative





*Physical Exam





- Vital Signs


                                Last Vital Signs











Temp Pulse Resp BP Pulse Ox


 


 98.7 F   101 H  19   151/102 H  98 


 


 09/11/20 13:00  09/11/20 13:00  09/11/20 13:00  09/11/20 13:00  09/11/20 13:00














- Physical Exam





09/11/20 14:17


GENERAL:


Well developed, well nourished. Awake and alert. No acute distress.


CARDIOVASCULAR:


Regular rate and rhythm. No murmurs, rubs, or gallops. 


PULMONARY: 


No evidence of respiratory distress. Lungs clear to auscultation bilaterally. No

wheezing, rales or rhonchi.


MUSCULOSKELETAL : mild tenderness to right paravertebral muscle of lumbosacral 

of L4-S2 with mild midline tenderness.  Decreased plantar and dorsiflexion to 

right foot as compared to left foot with patient laying down.  Patient unable to

fully elevate right lower extremity while laying down. No bony deformities 


EXTREMITIES: 


No cyanosis. No clubbing. No edema. No calf tenderness.


SKIN: 


Warm and dry. Normal capillary refill. 


NEUROLOGICAL: 


Alert, awake, appropriate.  Mild motor deficits in the right lower extremity.  


PSYCHIATRIC: 


Cooperative. Good eye contact. Appropriate mood and affect.


General Appearance: Yes: Nourished, Appropriately Dressed.  No: Apparent 

Distress





ED Treatment Course





- RADIOLOGY


Radiology Studies Ordered: 














 Category Date Time Status


 


 LUMBAR SPINE MRI W/O CONTRAST [MRI] Stat MRI  09/11/20 13:56 Ordered














Medical Decision Making





- Medical Decision Making





09/11/20 14:16


Patient with past medical history of schizophrenia, hypertension hyperlipidemia 

presented with behavioral health counselor with complaint of numbness to right 

lower extremity caused him to fall today.  Behavioral health counselor report 

patient was seen in [facility] 3 days ago for complaint of numbing 

sensation in right upper thigh and was advised he had muscle spasm but symptoms 

seem to be getting worse and staff reported patient has been dragging his right 

feet for the past 2 days.  Patient reported numbing sensation to right lower leg

 and difficulty raising right leg.  Reported mild pain to right lower back.  

Denies saddle paresthesia, urinary or fecal incontinence.  Patient not on any 

medication for pain





Exam significant for mild tenderness to right paravertebral muscle of 

lumbosacral of L4-S2 with mild midline tenderness.  Decreased plantar and 

dorsiflexion to right foot as compared to left foot with patient laying down.  

Patient unable to fully elevate right lower extremity while laying down.





Patient symptoms likely sciatica versus cauda equina syndrome given right foot 

drop.


MRI of lumbar spine ordered to rule out cauda equina syndrome.  Treat based on 

MRI result


09/11/20 15:52


Patient complaint of having back pain.  Toradol 30 mg IM and Lidoderm ordered 

for back pain.  Patient sent to MRI early and patient started having panic 

attack as she is claustrophobic.  Suggested to give patient Ativan to do MRI but

 MRI tech report patient will have to wait till later in the evening to do MRI 

as they are  backed up and will call when ready for patient to come back for MRI


09/11/20 20:54


Patient currently in MRI getting MRI done after Ativan 2 mg IV given.  Patient 

signed out to DOUG Jarvis for follow-up care


09/11/20 20:56


Neurosurgery Dr. Dobbins made aware of patient and will be called back after MRI





Discharge





- Discharge Information


Problems reviewed: Yes


Clinical Impression/Diagnosis: 


 Right foot drop





Peripheral neuropathy


Qualifiers:


 Peripheral neuropathy type: idiopathic neuropathy, unspecified Qualified 

Code(s): G60.9 - Hereditary and idiopathic neuropathy, unspecified





Condition: Stable





- Follow up/Referral


Referrals: 


Minor Contreras MD [Primary Care Provider] - 





- Patient Discharge Instructions





- Post Discharge Activity

## 2020-09-11 NOTE — PDOC
*Physical Exam





- Vital Signs


                                Last Vital Signs











Temp Pulse Resp BP Pulse Ox


 


 98.6 F   114 H  20   149/127 H  97 


 


 09/11/20 21:55  09/11/20 21:55  09/11/20 21:55  09/11/20 21:55  09/11/20 21:55














ED Treatment Course





- LABORATORY


CBC & Chemistry Diagram: 


                                 09/22/20 07:22





                                 09/22/20 06:00





- RADIOLOGY


Radiology Studies Ordered: 














 Category Date Time Status


 


 CHEST PA & LAT [RAD] Stat Radiology  09/11/20 23:11 Ordered














- Medications


Given in the ED: 


ED Medications














Discontinued Medications














Generic Name Dose Route Start Last Admin





  Trade Name Freq  PRN Reason Stop Dose Admin


 


Ketorolac Tromethamine  30 mg  09/11/20 16:04  09/11/20 16:54





  Toradol Injection -  IM  09/11/20 16:05  30 mg





  ONCE ONE   Administration


 


Lidocaine  1 patch  09/11/20 16:04  09/11/20 16:54





  Lidoderm Patch -  TP  09/11/20 16:05  1 patch





  ONCE ONE   Administration


 


Lorazepam  0.5 mg  09/11/20 15:49  09/11/20 17:41





  Ativan Injection -  IVPUSH  09/11/20 15:50  0.5 mg





  ONCE ONE   Administration


 


Lorazepam  0.5 mg  09/11/20 18:39  09/11/20 19:47





  Ativan Injection -  IVPUSH  09/11/20 18:40  Not Given





  ONCE ONE  


 


Lorazepam  2 mg  09/11/20 19:37  09/11/20 20:09





  Ativan Injection -  IM  09/11/20 19:38  Not Given





  ONCE ONE  


 


Lorazepam  2 mg  09/11/20 20:01  09/11/20 19:35





  Ativan Injection -  IVPUSH  09/11/20 20:02  2 mg





  ONCE ONE   Administration














ED Progress Note





- Progress Note


Progress Note: 





09/11/20 23:14


Received signout from ROXANNA Roland.





Briefly this is a 59-year-old male with history of schizophrenia, hypertension, 

hyperlipidemia with numbness to his right leg causing patient to have falls.  

Patient was seen at evaluate another hospital for numbing sensation in his right

 thigh and was told it was a muscle spasm.  Patient now with tingling sensation 

to his right lower extremity and difficulty lifting his right leg.





MRI of the lumbar spine shows no significant lesion.





Awaiting neurosurgery recommendations for disposition





Medical Decision Making





- Medical Decision Making





09/11/20 23:14


Case has been discussed with Dr. Dobbins of neurosurgery.  His MRI shows no 

lesions but does show probable herniation at the level of L3-L4.  As patient has

 had multiple falls in the emergency department and is unable to ambulate safely

 I will admit patient for steroids.





Labs, chest x-ray, EKG ordered


We will admit the hospitalist when resulted.





09/11/20 23:30





09/11/20 23:30





09/12/20 00:00


Patient has been signed out to Dr. dubois for continued evaluation and admission.





Discharge





- Discharge Information


Problems reviewed: Yes


Clinical Impression/Diagnosis: 


 Right foot drop, Impaired ambulation





Peripheral neuropathy


Qualifiers:


 Peripheral neuropathy type: idiopathic neuropathy, unspecified Qualified 

Code(s): G60.9 - Hereditary and idiopathic neuropathy, unspecified





Condition: Guarded


Disposition: SKILLED NURSING FACILITY





- Follow up/Referral





- Patient Discharge Instructions





- Post Discharge Activity

## 2020-09-12 LAB
ALBUMIN SERPL-MCNC: 3.4 G/DL (ref 3.4–5)
ALP SERPL-CCNC: 54 U/L (ref 45–117)
ALT SERPL-CCNC: 18 U/L (ref 13–61)
AMPHET UR-MCNC: NEGATIVE NG/ML
ANION GAP SERPL CALC-SCNC: 8 MMOL/L (ref 8–16)
APPEARANCE UR: CLEAR
AST SERPL-CCNC: 17 U/L (ref 15–37)
BARBITURATES UR-MCNC: NEGATIVE NG/ML
BENZODIAZ UR SCN-MCNC: NEGATIVE NG/ML
BILIRUB SERPL-MCNC: 0.2 MG/DL (ref 0.2–1)
BILIRUB UR STRIP.AUTO-MCNC: NEGATIVE MG/DL
BUN SERPL-MCNC: 17.4 MG/DL (ref 7–18)
CALCIUM SERPL-MCNC: 8.6 MG/DL (ref 8.5–10.1)
CHLORIDE SERPL-SCNC: 111 MMOL/L (ref 98–107)
CO2 SERPL-SCNC: 23 MMOL/L (ref 21–32)
COCAINE UR-MCNC: NEGATIVE NG/ML
COLOR UR: YELLOW
CREAT SERPL-MCNC: 1.2 MG/DL (ref 0.55–1.3)
GLUCOSE SERPL-MCNC: 167 MG/DL (ref 74–106)
KETONES UR QL STRIP: NEGATIVE
LEUKOCYTE ESTERASE UR QL STRIP.AUTO: NEGATIVE
METHADONE UR-MCNC: NEGATIVE NG/ML
NITRITE UR QL STRIP: NEGATIVE
OPIATES UR QL SCN: NEGATIVE NG/ML
PCP UR QL SCN: NEGATIVE NG/ML
PH UR: 5.5 [PH] (ref 5–8)
POTASSIUM SERPLBLD-SCNC: 4 MMOL/L (ref 3.5–5.1)
PROT SERPL-MCNC: 7 G/DL (ref 6.4–8.2)
PROT UR QL STRIP: (no result)
PROT UR QL STRIP: (no result)
SODIUM SERPL-SCNC: 143 MMOL/L (ref 136–145)
SP GR UR: 1.02 (ref 1.01–1.03)
UROBILINOGEN UR STRIP-MCNC: 1 MG/DL (ref 0.2–1)

## 2020-09-12 RX ADMIN — INSULIN ASPART SCH UNITS: 100 INJECTION, SOLUTION INTRAVENOUS; SUBCUTANEOUS at 22:27

## 2020-09-12 RX ADMIN — INSULIN ASPART SCH: 100 INJECTION, SOLUTION INTRAVENOUS; SUBCUTANEOUS at 12:45

## 2020-09-12 RX ADMIN — INSULIN ASPART SCH: 100 INJECTION, SOLUTION INTRAVENOUS; SUBCUTANEOUS at 18:53

## 2020-09-12 RX ADMIN — GABAPENTIN SCH MG: 100 CAPSULE ORAL at 15:45

## 2020-09-12 RX ADMIN — INSULIN ASPART SCH UNITS: 100 INJECTION, SOLUTION INTRAVENOUS; SUBCUTANEOUS at 09:30

## 2020-09-12 RX ADMIN — GABAPENTIN SCH MG: 100 CAPSULE ORAL at 22:26

## 2020-09-12 RX ADMIN — ENOXAPARIN SODIUM SCH MG: 40 INJECTION SUBCUTANEOUS at 11:47

## 2020-09-12 NOTE — HP
CHIEF COMPLAINT: leg numbness





PCP:





HISTORY OF PRESENT ILLNESS:


Patient is a 58 y/o male with a history of HTN, HLD, schizophrenia and DM who 

presented to the ED for new leg numbness.  Tried to walk patient with ED staff, 

he could barely stand, every time he got up he fell back to bed. He could not 

express why he could not stand. He states he has some low back pain and it is 

fairly new. he lives in a group home in [place]. States he is hungry. Patient 

uninterested in further discussing condition.





ED concerned for cauda equina, Ativan x2 given so patient could have a MRI which

showed L3-L4 disc herniation, no evidence of cauda equina, they spoke with Dr. Bowie who suggested steroids for any edema. Patient unsafe discharge while he

cannot ambulate.





ER course was notable for:


(1)


(2)


(3)





Recent Travel:





PAST MEDICAL HISTORY: HTN, HLD, schizophrenia and DM 





PAST SURGICAL HISTORY: denies





Social History:


Smoking: denies


Alcohol: denies


Drugs: denies





Allergies





No Known Allergies Allergy (Verified 09/11/20 13:05)


   








HOME MEDICATIONS:


                                Home Medications











 Medication  Instructions  Recorded


 


ZyPREXA -  01/09/19








REVIEW OF SYSTEMS


unwilling to answer








PHYSICAL EXAMINATION


                               Vital Signs - 24 hr











  09/11/20 09/11/20 09/11/20





  13:00 19:51 21:55


 


Temperature 98.7 F 98.5 F 98.6 F


 


Pulse Rate 101 H  


 


Pulse Rate [  101 H 114 H





Right Radial]   


 


Respiratory 19 16 20





Rate   


 


Blood Pressure 151/102 H  


 


Blood Pressure  156/96 149/127 H





[Left Arm]   


 


O2 Sat by Pulse 98 98 97





Oximetry (%)   











GENERAL: Awake, alert


HEAD: Normal with no signs of trauma.


EYES: Pupils equal, round and reactive to light, extraocular movements intact, 


EARS, NOSE, THROAT: Moist mucous membranes.


LUNGS: Breath sounds equal, clear to auscultation bilaterally. No wheezes, and 

no crackles. No accessory muscle use.


HEART: Regular rate and rhythm, normal S1 and S2 without murmur, rub or gallop.


ABDOMEN: Soft, nontender, not distended, normoactive bowel sounds, no guarding, 

no rebound, no masses.  No hepatomegaly or  splenomegaly. 


MUSCULOSKELETAL: unable to lift right leg without using his arms


LOWER EXTREMITIES:  No peripheral edema. 


NEUROLOGICAL:  very unsteady gait


PSYCHIATRIC: uncooperative


                                    CBC, BMP





                                 09/11/20 23:42 





                                 09/11/20 23:42 

















ASSESSMENT/PLAN:


Patient is a 58 y/o male with a history of HTN, HLD, schizophrenia and DM who 

presented to the ED for new leg numbness.





#leg numbness, unsteady gait


   - numbness likely 2/2 to L3-L4 disc herniation seen on MRI


   - unclear cause of unsteady gait, psych related vs infectious related vs 

central 


   - f/u UA, f/u head CT: no acute intracranial process, f/u utox


   - PT eval early tomorrow AM, patient could benefit from a cane





#DM


   - SS, BGM





#schizophrenia


   - patient on zyprexa but does not know the dose





FEN


   - diabetic diet





Dispo: monitor on med surg, please reconcile meds once pharmacy opens





Family Medical History


Family History: Denies





Visit type





- Emergency Visit


Emergency Visit: Yes


ED Registration Date: 09/12/20


Care time: The patient presented to the Emergency Department on the above date 

and was hospitalized for further evaluation of their emergent condition.





- New Patient


This patient is new to me today: Yes


Date on this admission: 09/12/20





- Critical Care


Critical Care patient: No





ATTENDING PHYSICIAN STATEMENT





I saw and evaluated the patient.


I reviewed the resident's note and discussed the case with the resident.


I agree with the resident's findings and plan as documented.








SUBJECTIVE:








OBJECTIVE:








ASSESSMENT AND PLAN:

## 2020-09-12 NOTE — PN
Progress Note (short form)





- Note


Progress Note: 





S: Still with reduced ROM with minimal parasthesias in RLE. No events overnight.

Patient reports he has difficulty with standing, but urination and defecations 

have been normal. 





                                   Vital Signs











Temperature  98.4 F   09/12/20 08:52


 


Pulse Rate  105 H  09/12/20 08:52


 


Respiratory Rate  20 09/12/20 04:37


 


Blood Pressure  147/84   09/12/20 08:52


 


O2 Sat by Pulse Oximetry (%)  93 L  09/12/20 08:52





PE:


Gen: NAD, awake, alert, oriented


HEENT: Nc/AT, ESTEFANIA, EOMI without nystagmus, MMM


LUNG: CTA b/l without wheezes or rales


CARD: RRR no murmurs appreciated


ABD: Soft, NT/ND, + BS


EXT: RLE strength decreased with LLE 5/5. Patellar reflexes 2/4 and equal, 

sensation intact distally in b/l lower extremities. Some limitation due to pain


GAIT: Unable to stand with my exam





                                    CBC, BMP





                                 09/11/20 23:42 





                                 09/11/20 23:42 





Active Medications





Enoxaparin Sodium (Lovenox -)  40 mg SQ DAILY [facility]


   Last Admin: 09/12/20 11:47 Dose:  40 mg


   Documented by: 


Gabapentin (Neurontin -)  100 mg PO TID [facility]


Insulin Aspart (Novolog Vial Sliding Scale -)  1 vial SQ ACHS [facility]; Protocol


   Last Admin: 09/12/20 12:45 Dose:  Not Given


   Documented by: 


Methylprednisolone (Medrol -)  0 mg PO DAILY [facility]; Taper


   Stop: 09/16/20 13:44


Olanzapine (Zyprexa -)  5 mg PO HS [facility]





Assessment and Plan:


Spinal stenosis at L3-L4 2/2 acute disc herniation


Inability to Ambulate


History of Schizophrenia


History of HTN


History of HLD


History of Type 2 DM





--Patient received steroids overnight at recommendation of NSx


--Discussed with neurosurgery and no acute emergent intervention; will see later

today


--Initiated Gabapentin 100mg TID PO


--Medrol dose pack to be given


--MRI with motion noted, discussed with radiology possible insurance coverage 

issues for second MRI in 24hours


   --Can hold off on repeat scan unless otherwise recommended by neurosurgery 

upon evaluation





--Continue home dose Zyprexa


--Monitor hemodynamics


--Patient would require physical therapy after acute issues to determine gait 

stability





DVT PPX: Lovenox





Dispo: Due to inability to ambulate at baseline and acute issues will continue 

to monitor and will likely need PT evaluation prior to d/c once acute issues 

resolve


DO Modesto Noonan

## 2020-09-12 NOTE — PN
Teaching Attending Note


Name of Resident: Verónica Marshall





ATTENDING PHYSICIAN STATEMENT





I saw and evaluated the patient.


I reviewed the resident's note and discussed the case with the resident.


I agree with the resident's findings and plan as documented.








SUBJECTIVE:


Patient is a 59 year old man with a PMH of Schizophrenia, Hypertension, NIDDM an

d Hyperlipidemia who presents to the ER with his Behavioral health counselor 

with complaint of numbness to right lower extremity that caused him to fall 

today. The Behavioral health counselor reports patient was seen in [facility] 3 days ago for complaint of numbing sensation in right upper thigh and 

was advised he had muscle spasm but symptoms seem to be getting worse and staff 

reported patient has been dragging his right feet for the past 2 days. Patient 

reported numbing sensation to right lower leg and difficulty raising right leg. 







Reported mild pain to right lower back. Denies saddle paresthesia, urinary or 

fecal incontinence. Patient not on any medication for pain. Patient denies chest

pain, shortness of breath, abdominal pain, headache, palpitations, dizziness, 

fever, chills, nausea, vomiting, diarrhea, constipation, dysuria, frequency, 

urgency, melena, hematochezia or hematuria. Denies alcohol, tobacco or illicit 

drug use. No sick contacts or recent travels. Family history is unremarkable. 

Patient unable to stand and walk in the ER even with support.





OBJECTIVE:


Alert


                                   Vital Signs











 Period  Temp  Pulse  Resp  BP Sys/Dinero  Pulse Ox


 


 Last 24 Hr  98.5 F-98.7 F  101-114  16-20  149-156/  97-98








HEENT: No Jaundice, eye redness or discharge, PERRLA, EOMI. Normocephalic, 

atraumatic. External ears are normal and hearing is grossly intact. No nasal 

discharge.


Neck: Supple, nontender. No palpable adenopathy or thyromegaly. No JVD


Chest: Good effort. Clear to auscultation and percussion.


Heart: Regular. No S3, rub or murmur


Abdomen: Not distended, soft, nontender and no HSM. No rebound or guarding. 

Normal bowel sounds.


Ext: Peripheral pulses intact. No leg edema.


Skin: Warm and dry. No petechiae, rash or ecchymosis.


Neuro: Alert. Oriented x3. CN 2-12 grossly intact. Sensation grossly intact in 

all four extremities; difficulty lifting RLE; unable to stand and walk; DTR are 

symmetric.


Psych: Appropriate mood and affect. Good insight.


                                Home Medications











 Medication  Instructions  Recorded


 


ZyPREXA -  01/09/19








                              Abnormal Lab Results











  09/11/20 09/11/20





  23:42 23:42


 


RBC  3.73 L 


 


MCV  97.7 H 


 


Chloride   111 H


 


Random Glucose   167 H








                               Current Medications











Generic Name Dose Route Start Last Admin





  Trade Name Parag  PRN Reason Stop Dose Admin


 


Enoxaparin Sodium  40 mg  09/12/20 10:00 





  Lovenox -  SQ  





  DAILY ZENOBIA  


 


Insulin Aspart  0 vial  09/12/20 07:00 





  Novolog Vial Sliding Scale -  SQ  





  [facility]  





  Protocol  








ASSESSMENT AND PLAN:


1. Right LE weakness/Inability to ambulate - Etiology unclear. Lumbar MRI shows 

L3-L4 disc herniation. ER staff prescribed Ativan, Ketorolac, Solumedrol and 

Lidocaine patch for the patient. EKG shows sinus tachycardia at 108/minute and 

QTc 466 with T wave inversion in I, III, aVL, V1-2, V4-V6 with no ischemic ST 

changes. Not significantly changed compared to prior EKG from 1/14/2019. Initial

troponin is negative. Will avoid drugs that may prolong QTc. Will get 

urinalysis, CXR, urine toxicology, head CT, do neurochecks and implement fall 

precautions. Consult PT/Neurology/Neurosurgery. Viral testing for COVID-19 

ordered and patient placed on airborne, droplet and contact isolation. 





2. Uncontrolled DM  For now, we will hold the home diabetes drugs and implement

sliding scale insulin regimen. Provide comprehensive diabetes care with patient 

teaching and counseling about the importance of adherence to prescribed diabetes

regimen, euglycemia, eye care and foot care.





3. Hypertension  Will restart suitable outpatient antihypertensive drugs when 

clinically appropriate. Subsequently, will revise regimen to ensure 

round-the-clock excellent BP control. Patient counseled on the injurious effects

of uncontrolled hypertension. Nonpharmacologic measures to control hypertension 

like weight loss, salt restriction and exercise stressed. Importance of 

adherence to treatment regimen and attainment of normotension emphasized. 





4. DVT prophylaxis - Lovenox 40 mg SQ q 24 hours.    





5. Advance directives - Full code

## 2020-09-12 NOTE — CONSULT
Consult - text type





- Consultation


Consultation Note: 








NEUROLOGY CONSULTATION is greatly appreciated:





This 58 yo RH man with DM, Chol, schizphrenia


is maintained on Zyprexa, Insulin


Here after 3-4 days of new and acute LBP radiating to the right thigh and 

weakness of the right leg with loss of ambulation.


No change in bowel or bladder function








CT of head (reviewed): Normal


MRI of LS spine (reviewed0: L4L5 HNP with disc fragment in the right lateral 

recess (T2 axial studies not done)





SHAYY: + SLR on the right


NEURO: MS/Speech: Normal


           CN II-XII: Normal


           Motor: No drift. Normal strength both arms and the left leg.


                     Right Hip flexion 3/5. Knee ext 0/5. Ankle DF 3/5. Ankle PF

5/5.


                     Absent right KJ with normal right AJ. Toes downgoing.


           Coord: No FTN dystaxia


           Sensory: Normal


           Gait: Pt fell tring to get OO Bed this AM





IMP: Acute L4 radiculopathy on the right. Cannot exclude some L4 and L5 

involvement as well


       L4L5 HNP with probable fragment to the right.





SUGGEST: CT of LS spine (C-) stat


               Neurosurgical consultation Dr. Heath- urgent.





Thank you very much,


Christian Amin MD
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
## 2020-09-13 LAB
ALBUMIN SERPL-MCNC: 2.9 G/DL (ref 3.4–5)
ALP SERPL-CCNC: 42 U/L (ref 45–117)
ALT SERPL-CCNC: 19 U/L (ref 13–61)
ANION GAP SERPL CALC-SCNC: 4 MMOL/L (ref 8–16)
AST SERPL-CCNC: 12 U/L (ref 15–37)
BASOPHILS # BLD: 0.4 % (ref 0–2)
BILIRUB SERPL-MCNC: 0.3 MG/DL (ref 0.2–1)
BUN SERPL-MCNC: 20.4 MG/DL (ref 7–18)
CALCIUM SERPL-MCNC: 8.5 MG/DL (ref 8.5–10.1)
CHLORIDE SERPL-SCNC: 110 MMOL/L (ref 98–107)
CO2 SERPL-SCNC: 29 MMOL/L (ref 21–32)
CREAT SERPL-MCNC: 1.3 MG/DL (ref 0.55–1.3)
DEPRECATED RDW RBC AUTO: 12.7 % (ref 11.9–15.9)
EOSINOPHIL # BLD: 0.9 % (ref 0–4.5)
GLUCOSE SERPL-MCNC: 115 MG/DL (ref 74–106)
HCT VFR BLD CALC: 32.6 % (ref 35.4–49)
HGB BLD-MCNC: 10.9 GM/DL (ref 11.7–16.9)
LYMPHOCYTES # BLD: 46.5 % (ref 8–40)
MAGNESIUM SERPL-MCNC: 2.1 MG/DL (ref 1.8–2.4)
MCH RBC QN AUTO: 33.1 PG (ref 25.7–33.7)
MCHC RBC AUTO-ENTMCNC: 33.5 G/DL (ref 32–35.9)
MCV RBC: 98.7 FL (ref 80–96)
MONOCYTES # BLD AUTO: 7 % (ref 3.8–10.2)
NEUTROPHILS # BLD: 45.2 % (ref 42.8–82.8)
PHOSPHATE SERPL-MCNC: 4.2 MG/DL (ref 2.5–4.9)
PLATELET # BLD AUTO: 159 K/MM3 (ref 134–434)
PMV BLD: 9.5 FL (ref 7.5–11.1)
POTASSIUM SERPLBLD-SCNC: 4.2 MMOL/L (ref 3.5–5.1)
PROT SERPL-MCNC: 7 G/DL (ref 6.4–8.2)
RBC # BLD AUTO: 3.3 M/MM3 (ref 4–5.6)
SODIUM SERPL-SCNC: 143 MMOL/L (ref 136–145)
WBC # BLD AUTO: 5.4 K/MM3 (ref 4–10)

## 2020-09-13 RX ADMIN — GABAPENTIN SCH MG: 300 CAPSULE ORAL at 22:26

## 2020-09-13 RX ADMIN — GABAPENTIN SCH MG: 100 CAPSULE ORAL at 13:55

## 2020-09-13 RX ADMIN — GABAPENTIN SCH MG: 100 CAPSULE ORAL at 06:02

## 2020-09-13 RX ADMIN — INSULIN ASPART SCH UNITS: 100 INJECTION, SOLUTION INTRAVENOUS; SUBCUTANEOUS at 16:47

## 2020-09-13 RX ADMIN — INSULIN ASPART SCH: 100 INJECTION, SOLUTION INTRAVENOUS; SUBCUTANEOUS at 22:27

## 2020-09-13 RX ADMIN — INSULIN ASPART SCH UNITS: 100 INJECTION, SOLUTION INTRAVENOUS; SUBCUTANEOUS at 11:11

## 2020-09-13 RX ADMIN — INSULIN ASPART SCH: 100 INJECTION, SOLUTION INTRAVENOUS; SUBCUTANEOUS at 06:02

## 2020-09-13 RX ADMIN — ENOXAPARIN SODIUM SCH MG: 40 INJECTION SUBCUTANEOUS at 11:13

## 2020-09-13 NOTE — PN
Progress Note (short form)





- Note


Progress Note: 








NEUROLOGY PROGRESS:





Pt found resting comfortably.


Knows he is "not allowed to walk."


Min. LBP with Numbness Right thigh.


Able to lift right leg "a little."


No change in Bowel or bladder.





CT of LS spine confirms the presence of degenerative changes aat L4L5 with a rig

ht foraminal HNP.





EXAM: elevates right leg off the bed.


          Right knee extension is 5/5 but right KJ is absent (normal on the 

left).


          Right ankle DF 4-/5.


          Both AJ's present but reduced, symmetrically.


          Toes downgoing





IMP: L4 +/- L5 radiculopathy on the right


       L4L5 HNP -> right





SUGGEST: MRI of LS spine (C-) after alprazolam 1 mg


               EMG/NCS legs


               Neurosurgical opinion





Thank you very much,


Christian Amin MD

## 2020-09-13 NOTE — PN
Progress Note (short form)





- Note


Progress Note: 





S: Still with reduced ROM with parasthesias in RLE. No events overnight. No 

changes from yesterday to today


                                        


                                   Vital Signs











Temperature  98.6 F   09/13/20 13:00


 


Pulse Rate  85   09/13/20 13:00


 


Respiratory Rate  18   09/13/20 13:00


 


Blood Pressure  147/96   09/13/20 13:00


 


O2 Sat by Pulse Oximetry (%)  97   09/13/20 13:00











PE:


Gen: NAD, awake, alert, oriented


HEENT: Nc/AT, ESTEFANIA, MMM


LUNG: CTA b/l without wheezes or rales


CARD: RRR no murmurs appreciated


ABD: Soft, NT/ND, + BS


Neuro: RLE Strength 3/5, LLE 5/5, patellar reflexes present 2/4 b/l, downgoing 

babinski, sensation intact in b/l feet.


EXT: No edema, warmth b/l, no calf tenderness


                                    CBC, BMP





                                 09/13/20 08:30 





                                 09/13/20 08:30 








Active Medications





Enoxaparin Sodium (Lovenox -)  40 mg SQ DAILY [facility]


   Last Admin: 09/13/20 11:13 Dose:  40 mg


   Documented by: 


Gabapentin (Neurontin -)  100 mg PO TID [facility]


   Last Admin: 09/13/20 13:55 Dose:  100 mg


   Documented by: 


Insulin Aspart (Novolog Vial Sliding Scale -)  [facility]; Protocol


   Last Admin: 09/13/20 11:11 Dose:  2 units


   Documented by: 


Methylprednisolone (Medrol -)  4 mg PO DAILY [facility]


   Stop: 09/13/20 22:00


   Last Admin: 09/13/20 11:13 Dose:  4 mg


   Documented by: 


Methylprednisolone (Medrol -)  3 mg PO DAILY [facility]


   Stop: 09/14/20 22:00


Methylprednisolone (Medrol -)  2 mg PO DAILY [facility]


   Stop: 09/16/20 10:01


Morphine Sulfate (Morphine Sulfate)  2 mg IVPUSH ONCE ONE


   Stop: 09/12/20 18:38


Olanzapine (Zyprexa -)  5 mg PO HS [facility]


   Last Admin: 09/12/20 22:26 Dose:  5 mg


   Documented by: 











Assessment and Plan:


Spinal stenosis at L3-L4 2/2 acute disc herniation


Inability to Ambulate


History of Schizophrenia


History of HTN


History of HLD


History of Type 2 DM





--Medrol to continue


--Neurology on board


--L-spine CT: FINDINGS: No evidence of acute fracture or listhesis No 

destructive lesions identified There is degenerative discogenic narrowing at L4-

5 with moderate circumferential disc bulging with impingement on both right and 

left neuroforamina and lateral recesses; right more than left There is mild 

circumferential diffuse disc bulging L3-4 IMPRESSION: Degenerative discogenic 

narrowing L4-5 with moderate circumferential disc bulging with impingement on 

the right neural foramina and right lateral recess These findings should be 

confirmed by MRI when patient is able to cooperate 





--Previous talked with neurosurgery regarding no intervention based on 

incomplete MRI. Awaiting f/u given unresolving symptoms and L-spine CT


--Increase to  Gabapentin 300mg TID PO





--Continue home dose Zyprexa


--Monitor hemodynamics


--Patient would require physical therapy after acute issues to determine gait 

stability





DVT PPX: Lovenox





Dispo: Monitor M/S; NSx f/u


DO Modesto Noonan

## 2020-09-14 LAB
ANION GAP SERPL CALC-SCNC: 5 MMOL/L (ref 8–16)
BUN SERPL-MCNC: 20.1 MG/DL (ref 7–18)
CALCIUM SERPL-MCNC: 8.8 MG/DL (ref 8.5–10.1)
CHLORIDE SERPL-SCNC: 108 MMOL/L (ref 98–107)
CO2 SERPL-SCNC: 28 MMOL/L (ref 21–32)
CREAT SERPL-MCNC: 1.2 MG/DL (ref 0.55–1.3)
DEPRECATED RDW RBC AUTO: 12.7 % (ref 11.9–15.9)
GLUCOSE SERPL-MCNC: 114 MG/DL (ref 74–106)
HCT VFR BLD CALC: 33 % (ref 35.4–49)
HGB BLD-MCNC: 10.8 GM/DL (ref 11.7–16.9)
MCH RBC QN AUTO: 32.3 PG (ref 25.7–33.7)
MCHC RBC AUTO-ENTMCNC: 32.7 G/DL (ref 32–35.9)
MCV RBC: 98.9 FL (ref 80–96)
PLATELET # BLD AUTO: 161 K/MM3 (ref 134–434)
PMV BLD: 9.6 FL (ref 7.5–11.1)
POTASSIUM SERPLBLD-SCNC: 4.2 MMOL/L (ref 3.5–5.1)
RBC # BLD AUTO: 3.34 M/MM3 (ref 4–5.6)
SODIUM SERPL-SCNC: 141 MMOL/L (ref 136–145)
WBC # BLD AUTO: 5.4 K/MM3 (ref 4–10)

## 2020-09-14 RX ADMIN — ENOXAPARIN SODIUM SCH MG: 40 INJECTION SUBCUTANEOUS at 09:58

## 2020-09-14 RX ADMIN — INSULIN ASPART SCH: 100 INJECTION, SOLUTION INTRAVENOUS; SUBCUTANEOUS at 17:58

## 2020-09-14 RX ADMIN — GABAPENTIN SCH MG: 300 CAPSULE ORAL at 14:12

## 2020-09-14 RX ADMIN — GABAPENTIN SCH MG: 300 CAPSULE ORAL at 21:20

## 2020-09-14 RX ADMIN — INSULIN ASPART SCH: 100 INJECTION, SOLUTION INTRAVENOUS; SUBCUTANEOUS at 06:43

## 2020-09-14 RX ADMIN — GABAPENTIN SCH MG: 300 CAPSULE ORAL at 05:59

## 2020-09-14 RX ADMIN — INSULIN ASPART SCH UNITS: 100 INJECTION, SOLUTION INTRAVENOUS; SUBCUTANEOUS at 12:29

## 2020-09-14 RX ADMIN — INSULIN ASPART SCH: 100 INJECTION, SOLUTION INTRAVENOUS; SUBCUTANEOUS at 21:21

## 2020-09-14 NOTE — PN
Teaching Attending Note


Name of Resident: Christian Saldivar





ATTENDING PHYSICIAN STATEMENT





I saw and evaluated the patient.


I reviewed the resident's note and discussed the case with the resident.


I agree with the resident's findings and plan as documented.








SUBJECTIVE:


Seen and examined at bedside.  MRI shows "L3-L4 right-sided foraminal herniation

with mass-effect on the right nerve root in the foramen and L4-5 also right-

sided herniation smaller in size indenting on the right nerve root in the 

foramen with a bulging annulus lateralizing toward the left."  Still pending 

evaluation from neurosurgery





OBJECTIVE


                                Last Vital Signs











Temp Pulse Resp BP Pulse Ox


 


 97.6 F   78   18   145/99   97 


 


 09/14/20 07:00  09/14/20 07:00  09/14/20 07:00  09/14/20 07:00  09/14/20 07:00











PE: Per resident note


Labs/Imaging: reviewed





ASSESSMENT/PLAN


59-year-old male with history of hypertension, hyperlipidemia, schizophrenia, 

diabetes presented to the ED with inability to ambulate and right lower 

extremity new numbness.  Patient found to have compression of L3-L4 and L4-L5 

nerve root.





#Inability to ambulate and right lower extremity weakness/numbness secondary to 

nerve root compression


Repeat MRI resulted today


Pending neurosurgical evaluation: surgical team notified


Continue steroids


Neurology and PM&R on board: Appreciate recommendations


Continue gabapentin





#Schizophrenia


Continue olanzapine





#Diabetes mellitus


Check A1c


Insulin sliding scale





#DVT prophylaxis: Lovenox

## 2020-09-14 NOTE — EKG
Test Reason : 

Blood Pressure : ***/*** mmHG

Vent. Rate : 108 BPM     Atrial Rate : 108 BPM

   P-R Int : 146 ms          QRS Dur : 080 ms

    QT Int : 348 ms       P-R-T Axes : 044 007 135 degrees

   QTc Int : 466 ms

 

SINUS TACHYCARDIA

T WAVE ABNORMALITY, CONSIDER ANTEROLATERAL ISCHEMIA

ABNORMAL ECG

WHEN COMPARED WITH ECG OF 14-JAN-2019 08:30,

T WAVE VARIATION

Confirmed by KOSTAS LEAL MD ([id number]) on 9/14/2020 11:32:11 AM

 

Referred By:             Confirmed By:KOSTAS LEAL MD
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
## 2020-09-15 RX ADMIN — INSULIN ASPART SCH: 100 INJECTION, SOLUTION INTRAVENOUS; SUBCUTANEOUS at 06:40

## 2020-09-15 RX ADMIN — GABAPENTIN SCH MG: 300 CAPSULE ORAL at 13:47

## 2020-09-15 RX ADMIN — INSULIN ASPART SCH UNITS: 100 INJECTION, SOLUTION INTRAVENOUS; SUBCUTANEOUS at 11:51

## 2020-09-15 RX ADMIN — METHYLPREDNISOLONE SCH MG: 2 TABLET ORAL at 10:05

## 2020-09-15 RX ADMIN — GABAPENTIN SCH MG: 300 CAPSULE ORAL at 21:17

## 2020-09-15 RX ADMIN — ENOXAPARIN SODIUM SCH MG: 40 INJECTION SUBCUTANEOUS at 10:04

## 2020-09-15 RX ADMIN — INSULIN ASPART SCH UNITS: 100 INJECTION, SOLUTION INTRAVENOUS; SUBCUTANEOUS at 22:30

## 2020-09-15 RX ADMIN — GABAPENTIN SCH MG: 300 CAPSULE ORAL at 06:31

## 2020-09-15 NOTE — PN
Physical Exam: 


SUBJECTIVE: Patient seen and examined at bedside. No acute events overnight. Pt 

still complaining of RLE numbness.








OBJECTIVE:





                                   Vital Signs











 Period  Temp  Pulse  Resp  BP Sys/Dinero  Pulse Ox


 


 Last 24 Hr  97.9 F-98.9 F  81-93  18-18  138-158/71-86  96-99











Gen: NAD, awake, alert, oriented


HEENT: Nc/AT, ESTEFANIA, MMM


LUNG: CTA b/l without wheezes or rales


CARD: RRR no murmurs appreciated


ABD: Soft, NT/ND, + BS


Neuro: RLE Strength 3/5, LLE 5/5, patellar reflexes present 2/4 b/l, downgoing 

babinski, sensation intact in b/l feet.


EXT: No edema, warmth b/l, no calf tenderness














                         Laboratory Results - last 24 hr











  09/14/20 09/14/20 09/15/20





  16:43 21:20 06:33


 


POC Glucometer  143  130  105


 


Hemoglobin A1c %   














  09/15/20 09/15/20





  07:20 11:39


 


POC Glucometer   154


 


Hemoglobin A1c %  6.5 H 








Active Medications











Generic Name Dose Route Start Last Admin





  Trade Name Freq  PRN Reason Stop Dose Admin


 


Enoxaparin Sodium  40 mg  09/12/20 10:00  09/15/20 10:04





  Lovenox -  SQ   40 mg





  DAILY ZENOBIA   Administration


 


Gabapentin  300 mg  09/13/20 15:45  09/15/20 06:31





  Neurontin -  PO   300 mg





  TID ZENOBIA   Administration


 


Insulin Aspart  1 vial  09/12/20 07:00  09/15/20 11:51





  Novolog Vial Sliding Scale -  SQ   2 units





  ACHS ZENOBIA   Administration





  Protocol  


 


Methylprednisolone  2 mg  09/15/20 10:00  09/15/20 10:05





  Medrol -  PO  09/16/20 10:01  2 mg





  DAILY ZENOBIA   Administration


 


Olanzapine  5 mg  09/12/20 22:00  09/14/20 21:20





  Zyprexa -  PO   5 mg





  HS ZENOBIA   Administration











IMAGING:


* L-spine MRI (9/12/20): Limited exam. No MRI evid of cauda quina compromise


* CT head (9/12/20): neg


* L-spine MRI (9/14/20): Degen. disc dz of L-spine predominantly in L3-L4, L4-

  L5. No evid of spondylolisthesis, spondylosis, severe stenosis; sacralization 

  of L5. L3-L4 R side foraminal herniation w/ mass effect on R nerve root in 

  foramen. L4-L5 R-side herniation, smaller in size indenting on R nerve root in

  foramen w/ bulging annulus.


* CT head (9/14/20): neg





ASSESSMENT/PLAN:


59M with pmhx of HTN/HLD, schizophrenia, DM presented to the ED with inability 

to ambulate and right lower extremity new numbness. Patient found to have 

compression of L3-L4 and L4-L5 nerve root.





#L3-L4/L4-L5 Nerve Root Compression; +RLE numbness


Repeat L-spine MRI results noted above; L3-L4, L4-L5 R side herniation


Per neuro, EMG/NCS of legs


-Neurosurg consulted; await recs


Medrol 2 mg PO QD


Neurology and PM&R on board: Appreciate recommendations


Gabapentin 300 TID


-PT





#Schizophrenia; Cont home med: Oanzapine 5 PO HS





#Diabetes mellitus; BGM/ISS ACHS





#Prophylaxis: Lovenox 40 SQ





Dispo


-Cont to monitor on med-surg





Visit type





- Emergency Visit


Emergency Visit: Yes


ED Registration Date: 09/12/20


Care time: The patient presented to the Emergency Department on the above date 

and was hospitalized for further evaluation of their emergent condition.





- New Patient


This patient is new to me today: Yes


Date on this admission: 09/15/20





- Critical Care


Critical Care patient: No





- Discharge Referral


Referred to [facility] No





ATTENDING PHYSICIAN STATEMENT





I saw and evaluated the patient.


I reviewed the resident's note and discussed the case with the resident.


I agree with the resident's findings and plan as documented.








SUBJECTIVE:








OBJECTIVE:








ASSESSMENT AND PLAN:

## 2020-09-15 NOTE — PN
Physical Exam: 


SUBJECTIVE: Patient seen and examined. Pt. endorses inability to flex R. hip, 

extend R. knee or R. dorsiflex foot. Pt. endorses numbness of anterior thigh and

of dorsum of right foot. Pt. denies any difficulties or changes in urinary or 

bowel habits 








OBJECTIVE:





                                   Vital Signs











 Period  Temp  Pulse  Resp  BP Sys/Dinero  Pulse Ox


 


 Last 24 Hr  97.9 F-98.9 F  81-93  18-18  138-158/71-95  96-99











GENERAL: The patient is awake, alert and in no acute distress.


HEAD: Normal with no signs of trauma.


EYES: Sclera anicteric, conjunctiva clear. No ptosis. 


ENT: moist mucous membranes.


NECK: Trachea midline, full range of motion, supple. 


LUNGS: Breath sounds equal, clear to auscultation bilaterally, no wheezes, no 

crackles, no accessory muscle use. 


HEART: Regular rate and rhythm, S1, S2 without murmur


ABDOMEN: Soft, nontender, nondistended, normoactive bowel sounds, no guarding, 

no rebound


EXTREMITIES: 2+ dorsal pedal pulses, warm, no calf tenderness, well-perfused, no

edema. 


NEUROLOGICAL: Numbness of R. Foot; 1/5 strength in r. hip flexion, R. knee 

extension, and R. foot dorsiflexion. 5/5 strength in upper extremities and in L.

extremity. Normal speech, gait not observed.


PSYCH: Decreased health literacy. 


SKIN: Warm, dry, normal turgor














                         Laboratory Results - last 24 hr











  09/14/20 09/14/20 09/15/20





  16:43 21:20 06:33


 


POC Glucometer  143  130  105


 


Hemoglobin A1c %   














  09/15/20 09/15/20





  07:20 11:39


 


POC Glucometer   154


 


Hemoglobin A1c %  6.5 H 








Active Medications











Generic Name Dose Route Start Last Admin





  Trade Name Freq  PRN Reason Stop Dose Admin


 


Enoxaparin Sodium  40 mg  09/12/20 10:00  09/15/20 10:04





  Lovenox -  SQ   40 mg





  DAILY ZENOBIA   Administration


 


Gabapentin  300 mg  09/13/20 15:45  09/15/20 13:47





  Neurontin -  PO   300 mg





  TID ZENOBIA   Administration


 


Insulin Aspart  1 vial  09/12/20 07:00  09/15/20 11:51





  Novolog Vial Sliding Scale -  SQ   2 units





  ACHS ZENOBIA   Administration





  Protocol  


 


Methylprednisolone  2 mg  09/15/20 10:00  09/15/20 10:05





  Medrol -  PO  09/16/20 10:01  2 mg





  DAILY ZENOBIA   Administration


 


Olanzapine  5 mg  09/12/20 22:00  09/14/20 21:20





  Zyprexa -  PO   5 mg





  HS ZENOBIA   Administration











ASSESSMENT/PLAN:


Pt. is a 59 y.o. M w/ PMHx. of  history of HTN, HLD, DM2 and schizophrenia 

presents to the ED with inability to ambulate and right lower extremity new 

numbness.  Patient found to have compression of L3-L4 and L4-L5 nerve root.





#Inability to ambulate and right lower extremity weakness/numbness secondary to 

nerve root compression


MRI showing L3-L4 and L4-L5 DDD, with foramninal narrowing and L4 nerve root 

compression


Neurosurgery consult appreciated


Continue Medrol


Neurology and PM&R consults appreciated 


Continue gabapentin


-PT after Neurosurgical evaluation





#Schizophrenia


Continue olanzapine





#Diabetes mellitus


f/u A1c


Insulin sliding scale until


-Medication reconciliation





#FEN


no IVF, encourage PO intake


monitor electrolytes and replete as needed


Diabetic Sodium controlled diet





#DVT PPx. 


 Lovenox 40





#Dispo


M/S, Pt. will likely need rehabilitation, will need PT eval for inpatient rehab.








Visit type





- Emergency Visit


Emergency Visit: Yes


ED Registration Date: 09/12/20


Care time: The patient presented to the Emergency Department on the above date 

and was hospitalized for further evaluation of their emergent condition.





- New Patient


This patient is new to me today: Yes


Date on this admission: 09/15/20





- Critical Care


Critical Care patient: No





- Discharge Referral


Referred to [facility] No





ATTENDING PHYSICIAN STATEMENT





I saw and evaluated the patient.


I reviewed the resident's note and discussed the case with the resident.


I agree with the resident's findings and plan as documented.








SUBJECTIVE:








OBJECTIVE:








ASSESSMENT AND PLAN:

## 2020-09-15 NOTE — PN
Progress Note (short form)





- Note


Progress Note: 








NEUROLOGY PROGRESS:





Events reviewed, Patient examined.


Pt fell again attempting to ambulate to the Bathroom


No head trauma or sequellae.


Pt complains of continued Right leg weakness and numbness right thigh.





MRI of LS spine (C-) reviewed: Good quality study. Right sided foraminal HNP's 

are seen at Both  L3L4 and L4L5 disc spaces on the Right.





EMG/NCS (reviewed): Acute L4 Radiculopathy on the right





EXAM: Pt has significant weakness (3/5 or less) at Hip flexion, Knee extension 

and ankle dorsiflexion (sparing ankle DF and knee flexion)


          Absent Right KJ.





IMP Acute L3 and L4 radiculopathy on the right.





SUGGEST: Patient should have acute decompressive laminectomies due to acute, 

severe weakness and loss of ambulation.


               Recall Dr. COCO Bowie or Neurosurgical second opinion.





Thank you very much,


Christian Amin MD

## 2020-09-15 NOTE — CONSULT
Consult - text type





- Consultation


Consultation Note: 





NEUROSURGERY CONSULTATION





Tristen Huang is a 59 year old  male who has a history of DM,

Hypercholesterolemia and Schizophrenia who presented to the [facility] ED on 

Saturday September 12, 2020 with a 3-4 day history of Right leg weakness with as

sociated low back pain and Right leg radicular pain. He denies any change in his

bowel and bladder habits. Patient underwent attempted MRI scan of the Lumbar 

spine on Saturday. Unfortunately, after some sagittal images were obtained, the 

patient became agitated and the exam was terminated. Patient was examined by Dr. Amin who felt that he had Right L4 weakness and STAT CT Lumbar was ordered. 

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 

(Bertolotti's syndrome) with the large Left L5 transverse process fused to the 

Left sacral ala and articulation of the Right L5 transverse process with the 

sacrum. There is enlargement of the L34 and L45 facets with foraminal 

encroachment that is particularly notable at L45 on the Right with moderate to 

severe compression of the exiting Right L4 nerve root. When I examined the 

patient, his pain had subsided however, his Right leg weakness persisted. He was

unable to lift his Right leg, even at the hip and described significant pain 

although he did not appear in significant distress. The patient has normal tone 

and although the pattern of weakness might suggest an upper motor neuron/spinal 

cord lesion, he is otherwise without associated findings supporting this 

possibility. Most likely, he has acute pain from enlargement of the Right L45 

disc and pain limited Neurological exam.





Neurosurgical options include foraminotomy/discectomy/focal decompression of the

Right L45 foramen as well as L4-S1 decompression and fusion. Given the short 

time course of symptoms, a conservative approach is preferred. This patient 

would likely benefit from injections such as Right L45 transforaminal injection 

or Lumbar epidural steroid injection. If he is unable to achieve satisfactory 

relief, injection of the pseudoarthrosis of the Right L5 transverse 

process/sacrum might be considered. These injections and possible trial of 

bracing as well as 8 weeks of Physical Therapy as a conservative management 

routine would be the best course of action at this time. There is no finding 

which mandates acute Neurosurgical intervention. Failing conservative 

management, any persisting symptoms might be appropriately treated with surgery.

## 2020-09-15 NOTE — FALL
Fall Exam





- Event


Witnessed fall: No


Location of Fall: Patient Room


Fall from: While ambulating





- Pre-Fall


Mental Status: Alert


Current Medications: 


                               Current Medications











Generic Name Dose Route Start Last Admin





  Trade Name Parag  PRN Reason Stop Dose Admin


 


Enoxaparin Sodium  40 mg  09/12/20 10:00  09/15/20 10:04





  Lovenox -  SQ   40 mg





  DAILY ZENOBIA   Administration


 


Gabapentin  300 mg  09/13/20 15:45  09/15/20 13:47





  Neurontin -  PO   300 mg





  TID ZENOBIA   Administration


 


Insulin Aspart  1 vial  09/12/20 07:00  09/15/20 11:51





  Novolog Vial Sliding Scale -  SQ   2 units





  ACHS ZENOBIA   Administration





  Protocol  


 


Methylprednisolone  2 mg  09/15/20 10:00  09/15/20 10:05





  Medrol -  PO  09/16/20 10:01  2 mg





  DAILY ZENOBIA   Administration


 


Olanzapine  5 mg  09/12/20 22:00  09/14/20 21:20





  Zyprexa -  PO   5 mg





  HS ZENOBIA   Administration














- Post-Fall


Patient Outcome: No Injury


Exam Findings: Nurses heard a loud noise and found pt down on the ground of 

bedroom. Pt states that he was walking to the bathroom to empty his urinal. Pt 

denies hitting head, LOC, pain in head/arms/legs. Urine on the ground from 

urinal.  HEAD: NC, no scalp hematoma. No nasal bridge deformity.  EYES: no 

periorbital hematoma.  CHEST: no pain on palpation, no clavicular deformity; no 

wheezes/ronchi; normal S1, S2; no murmurs noted.  ABD: nonTTP [id number].  

EXTREMITIES: neg long bone deformities of arms, legs. No abrasions/tears of the 

elbows knees.  NEURO: CN II-XII intact. Weakness of the RLE in hip 

flexion/extension, knee flexion/extension, foot dorsiflexion. 4/5 foot 

plantarflexion


Treatment: None ( Will get CTH to r/o intracranial hemorrhage)


Vital Signs: 


                                   Vital Signs











Temperature  99.0 F   09/15/20 18:00


 


Pulse Rate  105 H  09/15/20 18:00


 


Respiratory Rate  18   09/15/20 18:00


 


Blood Pressure  154/100   09/15/20 18:00


 


O2 Sat by Pulse Oximetry (%)  95   09/15/20 18:00

















, /96, breathing RA





LOC Post-Fall: Awake, Alert


Identify factors for HIGH RISK for Head Injury: None of the above (prophylatic 

lovenox)

## 2020-09-15 NOTE — PN
Teaching Attending Note


Name of Resident: Betina Hernandez





ATTENDING PHYSICIAN STATEMENT





I saw and evaluated the patient.


I reviewed the resident's note and discussed the case with the resident.


I agree with the resident's findings and plan as documented.








SUBJECTIVE:


Seen and examined at bedside.  MRI shows "L3-L4 right-sided foraminal herniation

with mass-effect on the right nerve root in the foramen and L4-5 also right-

sided herniation smaller in size indenting on the right nerve root in the 

foramen with a bulging annulus lateralizing toward the left."  Still pending 

evaluation from neurosurgery





OBJECTIVE


                                Last Vital Signs











Temp Pulse Resp BP Pulse Ox


 


 98.6 F   84   18   151/86   96 


 


 09/15/20 09:00  09/15/20 09:00  09/15/20 09:00  09/15/20 09:00  09/15/20 09:00











PE: Per resident note


Labs/Imaging: reviewed





ASSESSMENT/PLAN


59-year-old male with history of hypertension, hyperlipidemia, schizophrenia, 

diabetes presented to the ED with inability to ambulate and right lower 

extremity new numbness.  Patient found to have compression of L3


-L4 and L4-L5 nerve root.





#Inability to ambulate and right lower extremity weakness/numbness secondary to 

nerve root compression


MRI shows "L3-L4 right-sided foraminal herniation with mass-effect on the right 

nerve root in the foramen and L4-5 also right-sided herniation smaller in size 

indenting on the right nerve root in the foramen with a bulging annulus 

lateralizing toward the left."  Still pending evaluation from neurosurgery


Neurosurgery on board: "This patient would likely benefit from injections such 

as Right L45 transforaminal injection or Lumbar epidural steroid injection. If 

he is unable to achieve satisfactory relief, injection of the pseudoarthrosis of

the Right L5 transverse process/sacrum might be considered. These injections and

possible trial of bracing as well as 8 weeks of Physical Therapy as a 

conservative management routine would be the best course of action at this time.

There is no finding which mandates acute Neurosurgical intervention. Failing 

conservative management, any persisting symptoms might be appropriately treated 

with surgery."


Continue steroids


Neurology and PM&R on board: Appreciate recommendations


Continue gabapentin





#Schizophrenia


Continue olanzapine





#Diabetes mellitus


Check A1c


Insulin sliding scale





#DVT prophylaxis: Lovenox

## 2020-09-16 LAB
ANION GAP SERPL CALC-SCNC: 5 MMOL/L (ref 8–16)
APTT BLD: 36.5 SECONDS (ref 25.2–36.5)
BUN SERPL-MCNC: 19.1 MG/DL (ref 7–18)
CALCIUM SERPL-MCNC: 8.7 MG/DL (ref 8.5–10.1)
CHLORIDE SERPL-SCNC: 108 MMOL/L (ref 98–107)
CO2 SERPL-SCNC: 29 MMOL/L (ref 21–32)
CREAT SERPL-MCNC: 1.2 MG/DL (ref 0.55–1.3)
DEPRECATED RDW RBC AUTO: 12.8 % (ref 11.9–15.9)
GLUCOSE SERPL-MCNC: 126 MG/DL (ref 74–106)
HCT VFR BLD CALC: 34.9 % (ref 35.4–49)
HGB BLD-MCNC: 11.1 GM/DL (ref 11.7–16.9)
INR BLD: 0.92 (ref 0.83–1.09)
MCH RBC QN AUTO: 31.5 PG (ref 25.7–33.7)
MCHC RBC AUTO-ENTMCNC: 31.8 G/DL (ref 32–35.9)
MCV RBC: 99 FL (ref 80–96)
PLATELET # BLD AUTO: 214 K/MM3 (ref 134–434)
PMV BLD: 9 FL (ref 7.5–11.1)
POTASSIUM SERPLBLD-SCNC: 4.6 MMOL/L (ref 3.5–5.1)
PT PNL PPP: 10.8 SEC (ref 9.7–13)
RBC # BLD AUTO: 3.53 M/MM3 (ref 4–5.6)
SODIUM SERPL-SCNC: 142 MMOL/L (ref 136–145)
WBC # BLD AUTO: 5.1 K/MM3 (ref 4–10)

## 2020-09-16 RX ADMIN — METHYLPREDNISOLONE SCH MG: 2 TABLET ORAL at 09:44

## 2020-09-16 RX ADMIN — ENOXAPARIN SODIUM SCH MG: 40 INJECTION SUBCUTANEOUS at 09:44

## 2020-09-16 RX ADMIN — INSULIN ASPART SCH UNITS: 100 INJECTION, SOLUTION INTRAVENOUS; SUBCUTANEOUS at 21:05

## 2020-09-16 RX ADMIN — GABAPENTIN SCH MG: 300 CAPSULE ORAL at 06:17

## 2020-09-16 RX ADMIN — INSULIN ASPART SCH UNITS: 100 INJECTION, SOLUTION INTRAVENOUS; SUBCUTANEOUS at 11:44

## 2020-09-16 RX ADMIN — INSULIN ASPART SCH: 100 INJECTION, SOLUTION INTRAVENOUS; SUBCUTANEOUS at 16:57

## 2020-09-16 RX ADMIN — GABAPENTIN SCH MG: 300 CAPSULE ORAL at 14:44

## 2020-09-16 RX ADMIN — GABAPENTIN SCH MG: 300 CAPSULE ORAL at 21:05

## 2020-09-16 RX ADMIN — INSULIN ASPART SCH: 100 INJECTION, SOLUTION INTRAVENOUS; SUBCUTANEOUS at 07:09

## 2020-09-16 NOTE — CONSULT
Consult


Reason for Consultation:: Interventtional Pain Magagment





- History of Present Illness


Chief Complaint: Low back and right leg pain and weakness


History of Present Illness: 





Patient is a 58 y/o male with a history of HTN, HLD, schizophrenia and DM.  Pt 

has ad progressive weakness in his RLE with multiple falls. Pt states the pain 

started suddenly without trauma.  Pt started having worsening weakness.  Pt has 

no pain at this time. Pt had fall last evening. Imaging demonstrates 

transitional L5 vertebral body with psuedojoint and formanial narrowing at L3 

and L4 due to disk herniation adnd degenerative disease. Pt has been evaluated 

by Neurology and Neurosurgery. His primary complaint is his leg weakness.  





- History Source


History Provided By: Patient, Medical Record





- Past Medical History


Cardio/Vascular: Yes: HTN, Hyperlipdemia


Hepatobiliary: Yes: Hepatitis C (pt does not know about)


Psych: Yes: Schizophrenia


Endocrine: Yes: Diabetes Mellitus





- Past Surgical History


Past Surgical History: Yes: None





- Alcohol/Substance Use


Hx Alcohol Use: Yes (pt states not for last year in [facility])


History of Substance Use: reports: Marijuana (not for last year per pt while in 

program)





- Smoking History


Smoking history: Never smoked


Have you smoked in the past 12 months: Yes


Aproximately how many cigarettes per day: 20





- Social History


ADL: Independent


Occupation: unemployed





Home Medications





- Allergies


Allergies/Adverse Reactions: 


                                    Allergies











Allergy/AdvReac Type Severity Reaction Status Date / Time


 


No Known Allergies Allergy   Verified 09/11/20 13:05














- Home Medications


Home Medications: 


Ambulatory Orders





Olanzapine [Zyprexa] 5 mg PO HS 09/12/20 











Review of Systems





- Review of Systems


Constitutional: reports: No Symptoms


Eyes: reports: No Symptoms


HENT: reports: No Symptoms


Neck: reports: No Symptoms


Cardiovascular: reports: No Symptoms


Respiratory: reports: No Symptoms


Gastrointestinal: reports: No Symptoms


Genitourinary: reports: No Symptoms


Breasts: reports: No Symptoms Reported


Musculoskeletal: reports: Back Pain, Muscle Weakness


Integumentary: reports: No Symptoms


Neurological: reports: Weakness


Endocrine: reports: No Symptoms


Hematology/Lymphatic: reports: No Symptoms


Psychiatric: reports: No Symptoms





Physical Exam


Vital Signs: 


                                   Vital Signs











Temperature  98.6 F   09/16/20 06:00


 


Pulse Rate  71   09/16/20 06:00


 


Respiratory Rate  18   09/16/20 06:00


 


Blood Pressure  152/87   09/16/20 06:00


 


O2 Sat by Pulse Oximetry (%)  98   09/16/20 06:00











Constitutional: Yes: Well Nourished, No Distress, Calm


Eyes: Yes: Conjunctiva Clear, EOM Intact


HENT: Yes: Atraumatic, Normocephalic


Neck: Yes: Trachea Midline


Cardiovascular: Yes: Regular Rate and Rhythm


Respiratory: Yes: Regular


Gastrointestinal: Yes: Soft


Musculoskeletal: Yes: Back Pain, Muscle Weakness


Edema: No


Neurological: Yes: Alert, Oriented, Cran Nerves II-XII Intact, Weakness, Other 

(Absent pattellar reflex on the right)


...Motor Strength: LLE (5/5 throughout), RLE (HF 1/5 KE 2/5 Df 4/5  Pf 4/5)


Psychiatric: Yes: Alert, Oriented


Labs: 


                                    CBC, BMP





                                 09/14/20 07:32 





                                 09/14/20 07:32 











Imaging





- Results


Cat Scan: Report Reviewed, Image Reviewed


MRI: Report Reviewed, Image Reviewed





Assessment/Plan





The patients weakness is likley secondary lumbar radiculopathy on the right as 

imaging and emg is consistent with L4 radiculopathy. 


1. Recommend Neuro-surgical Evaluation. 


2. Pt is not a candidate for interventional pain management at this point in 

time as he is not having pain and has significant progressive neuro-deficits.





Thank you for your consult,





Jeet Cid DO 


[facility]


PM&R

## 2020-09-16 NOTE — PN
Teaching Attending Note


Name of Resident: Rose Kinney





ATTENDING PHYSICIAN STATEMENT





I saw and evaluated the patient.


I reviewed the resident's note and discussed the case with the resident.


I agree with the resident's findings and plan as documented.








SUBJECTIVE:


Seen and examined at bedside.  Patient's quadriceps are now without tone.  Spoke

with neurosurgery.  Patient will be taken to the OR tomorrow





OBJECTIVE


                                Last Vital Signs











Temp Pulse Resp BP Pulse Ox


 


 98.6 F   84   18   151/86   96 


 


 09/15/20 09:00  09/15/20 09:00  09/15/20 09:00  09/15/20 09:00  09/15/20 09:00











PE: Per resident note


Labs/Imaging: reviewed





ASSESSMENT/PLAN


59-year-old male with history of hypertension, hyperlipidemia, schizophrenia, 

diabetes presented to the ED with inability to ambulate and right lower 

extremity new numbness.  Patient found to have compression of L3


-L4 and L4-L5 nerve root.





#Inability to ambulate and right lower extremity weakness/numbness secondary to 

nerve root compression


MRI shows "L3-L4 right-sided foraminal herniation with mass-effect on the right 

nerve root in the foramen and L4-5 also right-sided herniation smaller in size 

indenting on the right nerve root in the foramen with a bulging annulus 

lateralizing toward the left."  


-pt to be taken to OR by neurosurgery tomorrow


Continue steroids


Neurology and PM&R on board: Appreciate recommendations


Continue gabapentin





#Schizophrenia


Continue olanzapine





#Diabetes mellitus


Insulin sliding scale





#DVT prophylaxis: Lovenox

## 2020-09-16 NOTE — PN
Physical Exam: 


SUBJECTIVE: Patient seen and examined at bedside; patient states he fell last 

night while he was going to the bathroom-he states his right leg just gave out; 

he is still having incredible weakness in that right leg and can barely move it 

; he denies any CP/SOB/N/V








OBJECTIVE:





                                   Vital Signs











 Period  Temp  Pulse  Resp  BP Sys/Dinero  Pulse Ox


 


 Last 24 Hr  98.4 F-99.0 F    18-18  130-156/  95-98











GENERAL: The patient is awake, alert, and fully oriented, in no acute distress.


EYES:PEERLA: EOMI no scleral icterus


NECK: no JVD no lymphadenopathy


LUNGS:CTA B/L no rales, rhonchi or wheezing


HEART: Regular rate and rhythm, S1, S2 without murmur, rub or gallop.


ABDOMEN: Soft, NT nD +BS in all 4 quadrants


EXTREMITIES: 2+ pulses, warm, well-perfused, no edema. 


NEUROLOGICAL: Cranial nerves II through XII grossly intact. Normal speech, LLE 

5/5 strength and sensation throughout RLE 1/5 strength sensation intact; 

patellar reflex 2/4 


PSYCH: Normal mood, normal affect.


SKIN: Warm, dry, normal turgor, no rashes or lesions noted














                         Laboratory Results - last 24 hr











  09/15/20 09/15/20 09/16/20





  17:05 19:25 06:13


 


WBC   


 


RBC   


 


Hgb   


 


Hct   


 


MCV   


 


MCH   


 


MCHC   


 


RDW   


 


Plt Count   


 


MPV   


 


Sodium   


 


Potassium   


 


Chloride   


 


Carbon Dioxide   


 


Anion Gap   


 


BUN   


 


Creatinine   


 


Est GFR (CKD-EPI)AfAm   


 


Est GFR (CKD-EPI)NonAf   


 


POC Glucometer  175  190  137


 


Random Glucose   


 


Calcium   














  09/16/20 09/16/20 09/16/20





  07:55 07:55 11:39


 


WBC  5.1  


 


RBC  3.53 L  


 


Hgb  11.1 L  


 


Hct  34.9 L  


 


MCV  99.0 H  


 


MCH  31.5  


 


MCHC  31.8 L  


 


RDW  12.8  


 


Plt Count  214  D  


 


MPV  9.0  


 


Sodium   142 


 


Potassium   4.6 


 


Chloride   108 H 


 


Carbon Dioxide   29 


 


Anion Gap   5 L 


 


BUN   19.1 H 


 


Creatinine   1.2 


 


Est GFR (CKD-EPI)AfAm   77.33 


 


Est GFR (CKD-EPI)NonAf   66.72 


 


POC Glucometer    155


 


Random Glucose   126 H 


 


Calcium   8.7 








Active Medications











Generic Name Dose Route Start Last Admin





  Trade Name Freq  PRN Reason Stop Dose Admin


 


Enoxaparin Sodium  40 mg  09/12/20 10:00  09/16/20 09:44





  Lovenox -  SQ   40 mg





  DAILY ZENOBIA   Administration


 


Gabapentin  300 mg  09/13/20 15:45  09/16/20 06:17





  Neurontin -  PO   300 mg





  TID ZENOBIA   Administration


 


Insulin Aspart  1 vial  09/12/20 07:00  09/16/20 11:44





  Novolog Vial Sliding Scale -  SQ   2 units





  [facility]OBIA   Administration





  Protocol  


 


Olanzapine  5 mg  09/12/20 22:00  09/15/20 21:16





  Zyprexa -  PO   5 mg





  HS ZENOBIA   Administration











ASSESSMENT/PLAN:





IMAGING:


* L-spine MRI (9/12/20): Limited exam. No MRI evid of cauda quina compromise


* CT head (9/12/20): neg


* L-spine MRI (9/14/20): Degen. disc dz of L-spine predominantly in L3-L4, L4-

  L5. No evid of spondylolisthesis, spondylosis, severe stenosis; sacralization 

  of L5. L3-L4 R side foraminal herniation w/ mass effect on R nerve root in 

  foramen. L4-L5 R-side herniation, smaller in size indenting on R nerve root in

  foramen w/ bulging annulus.


* CT head (9/14/20): neg





ASSESSMENT/PLAN:


59M with pmhx of HTN/HLD, schizophrenia, DM presented to the ED with inability 

to ambulate and right lower extremity new numbness. Patient found to have 

compression of L3-L4 and L4-L5 nerve root.





#L3-L4/L4-L5 Nerve Root Compression; +RLE numbness


Repeat L-spine MRI results noted above; L3-L4, L4-L5 R side herniation


-Neurosurg consulted; to be taken to the OR tomorrow morning


Neurology and PM&R on board: Appreciate recommendations


Gabapentin 300 TID


-PT





#Schizophrenia; Cont home med: Olanzapine 5 PO HS





#Diabetes mellitus; BGM/ISS ACHS





#Prophylaxis: Lovenox 40 SQ (on hold for OR tomorrow )





Dispo


-Cont to monitor on med-surg





NPO after MN





Problem List





- Problems


(1) Impaired ambulation


Code(s): R26.2 - DIFFICULTY IN WALKING, NOT ELSEWHERE CLASSIFIED   





(2) Peripheral neuropathy


Code(s): G62.9 - POLYNEUROPATHY, UNSPECIFIED   


Qualifiers: 


   Peripheral neuropathy type: idiopathic neuropathy, unspecified   Qualified 

Code(s): G60.9 - Hereditary and idiopathic neuropathy, unspecified   





(3) Right foot drop


Code(s): M21.371 - FOOT DROP, RIGHT FOOT   





Visit type





- Emergency Visit


Emergency Visit: Yes


ED Registration Date: 09/12/20


Care time: The patient presented to the Emergency Department on the above date 

and was hospitalized for further evaluation of their emergent condition.





- New Patient


This patient is new to me today: Yes


Date on this admission: 09/16/20





- Critical Care


Critical Care patient: No





ATTENDING PHYSICIAN STATEMENT





I saw and evaluated the patient.


I reviewed the resident's note and discussed the case with the resident.


I agree with the resident's findings and plan as documented.








SUBJECTIVE:








OBJECTIVE:








ASSESSMENT AND PLAN:

## 2020-09-16 NOTE — SPA.PREOP
- PRE-OP NOTE





Dx: L3 and L4 radiculopathy 





Planned Procedure: Rt L3/4/5 hemilaminectomies, discectomy, foraminotomies





Surgeon: Bobby Bowie MD





                                Last Vital Signs











Temp Pulse Resp BP Pulse Ox


 


 98.6 F   105 H  18   135/74   98 


 


 09/16/20 14:47  09/16/20 14:47  09/16/20 14:47  09/16/20 14:47  09/16/20 09:40








                                   Lab Results











WBC  5.1 K/mm3 (4.0-10.0)   09/16/20  07:55    


 


RBC  3.53 M/mm3 (4.00-5.60)  L  09/16/20  07:55    


 


Hgb  11.1 GM/dL (11.7-16.9)  L  09/16/20  07:55    


 


Hct  34.9 % (35.4-49)  L  09/16/20  07:55    


 


MCV  99.0 fl (80-96)  H  09/16/20  07:55    


 


MCHC  31.8 g/dl (32.0-35.9)  L  09/16/20  07:55    


 


RDW  12.8 % (11.9-15.9)   09/16/20  07:55    


 


Plt Count  214 K/MM3 (134-434)  D 09/16/20  07:55    


 


Sodium  142 mmol/L (136-145)   09/16/20  07:55    


 


Potassium  4.6 mmol/L (3.5-5.1)   09/16/20  07:55    


 


Chloride  108 mmol/L ()  H  09/16/20  07:55    


 


Carbon Dioxide  29 mmol/L (21-32)   09/16/20  07:55    


 


Anion Gap  5 MMOL/L (8-16)  L  09/16/20  07:55    


 


BUN  19.1 mg/dL (7-18)  H  09/16/20  07:55    


 


Creatinine  1.2 mg/dL (0.55-1.3)   09/16/20  07:55    


 


Random Glucose  126 mg/dL ()  H  09/16/20  07:55    


 


Calcium  8.7 mg/dL (8.5-10.1)   09/16/20  07:55    














- ASSESSMENT/PLAN





   1. Make NPO after midnight except po meds


   2. GI/DVT PPX


   3. Medical optimization / clearance


   4. Consent to be obtained by surgeon after risks, benefits and alternatives 

discussed with


       patient and or Health Care Proxy.

## 2020-09-17 LAB
ALBUMIN SERPL-MCNC: 2.8 G/DL (ref 3.4–5)
ALP SERPL-CCNC: 43 U/L (ref 45–117)
ALT SERPL-CCNC: 28 U/L (ref 13–61)
ANION GAP SERPL CALC-SCNC: 1 MMOL/L (ref 8–16)
AST SERPL-CCNC: 15 U/L (ref 15–37)
BASOPHILS # BLD: 0.5 % (ref 0–2)
BILIRUB SERPL-MCNC: 0.4 MG/DL (ref 0.2–1)
BUN SERPL-MCNC: 20.6 MG/DL (ref 7–18)
CALCIUM SERPL-MCNC: 8.7 MG/DL (ref 8.5–10.1)
CHLORIDE SERPL-SCNC: 110 MMOL/L (ref 98–107)
CO2 SERPL-SCNC: 31 MMOL/L (ref 21–32)
CREAT SERPL-MCNC: 1.2 MG/DL (ref 0.55–1.3)
DEPRECATED RDW RBC AUTO: 12.9 % (ref 11.9–15.9)
EOSINOPHIL # BLD: 1.2 % (ref 0–4.5)
GLUCOSE SERPL-MCNC: 127 MG/DL (ref 74–106)
HCT VFR BLD CALC: 32.9 % (ref 35.4–49)
HGB BLD-MCNC: 10.5 GM/DL (ref 11.7–16.9)
LYMPHOCYTES # BLD: 45.8 % (ref 8–40)
MAGNESIUM SERPL-MCNC: 2.1 MG/DL (ref 1.8–2.4)
MCH RBC QN AUTO: 31.4 PG (ref 25.7–33.7)
MCHC RBC AUTO-ENTMCNC: 32 G/DL (ref 32–35.9)
MCV RBC: 98.3 FL (ref 80–96)
MONOCYTES # BLD AUTO: 8.5 % (ref 3.8–10.2)
NEUTROPHILS # BLD: 44 % (ref 42.8–82.8)
PHOSPHATE SERPL-MCNC: 4 MG/DL (ref 2.5–4.9)
PLATELET # BLD AUTO: 212 K/MM3 (ref 134–434)
PMV BLD: 8.7 FL (ref 7.5–11.1)
POTASSIUM SERPLBLD-SCNC: 4.4 MMOL/L (ref 3.5–5.1)
PROT SERPL-MCNC: 6.2 G/DL (ref 6.4–8.2)
RBC # BLD AUTO: 3.35 M/MM3 (ref 4–5.6)
SODIUM SERPL-SCNC: 142 MMOL/L (ref 136–145)
WBC # BLD AUTO: 4.9 K/MM3 (ref 4–10)

## 2020-09-17 PROCEDURE — 0SB20ZZ EXCISION OF LUMBAR VERTEBRAL DISC, OPEN APPROACH: ICD-10-PCS | Performed by: NEUROLOGICAL SURGERY

## 2020-09-17 PROCEDURE — 4A1004G MONITORING OF CENTRAL NERVOUS ELECTRICAL ACTIVITY, INTRAOPERATIVE, OPEN APPROACH: ICD-10-PCS | Performed by: NEUROLOGICAL SURGERY

## 2020-09-17 PROCEDURE — B01BZZZ FLUOROSCOPY OF SPINAL CORD: ICD-10-PCS | Performed by: NEUROLOGICAL SURGERY

## 2020-09-17 PROCEDURE — 0JX70ZB TRANSFER BACK SUBCUTANEOUS TISSUE AND FASCIA WITH SKIN AND SUBCUTANEOUS TISSUE, OPEN APPROACH: ICD-10-PCS | Performed by: NEUROLOGICAL SURGERY

## 2020-09-17 PROCEDURE — 01NB0ZZ RELEASE LUMBAR NERVE, OPEN APPROACH: ICD-10-PCS | Performed by: NEUROLOGICAL SURGERY

## 2020-09-17 RX ADMIN — INSULIN ASPART SCH: 100 INJECTION, SOLUTION INTRAVENOUS; SUBCUTANEOUS at 11:34

## 2020-09-17 RX ADMIN — HEPARIN SODIUM SCH UNIT: 5000 INJECTION, SOLUTION INTRAVENOUS; SUBCUTANEOUS at 23:41

## 2020-09-17 RX ADMIN — GABAPENTIN SCH: 300 CAPSULE ORAL at 05:23

## 2020-09-17 RX ADMIN — GABAPENTIN SCH MG: 300 CAPSULE ORAL at 23:41

## 2020-09-17 RX ADMIN — INSULIN ASPART SCH: 100 INJECTION, SOLUTION INTRAVENOUS; SUBCUTANEOUS at 06:15

## 2020-09-17 RX ADMIN — DOCUSATE SODIUM SCH MG: 100 CAPSULE, LIQUID FILLED ORAL at 23:41

## 2020-09-17 RX ADMIN — GABAPENTIN SCH: 300 CAPSULE ORAL at 14:02

## 2020-09-17 RX ADMIN — INSULIN ASPART SCH UNITS: 100 INJECTION, SOLUTION INTRAVENOUS; SUBCUTANEOUS at 23:41

## 2020-09-17 NOTE — PN
Teaching Attending Note


Name of Resident: Rose Kinney





ATTENDING PHYSICIAN STATEMENT





I saw and evaluated the patient.


I reviewed the resident's note and discussed the case with the resident.


I agree with the resident's findings and plan as documented.








SUBJECTIVE:








OBJECTIVE:








ASSESSMENT AND PLAN:











IMAGING:


* L-spine MRI (9/12/20): Limited exam. No MRI evid of cauda quina compromise


* CT head (9/12/20): neg


* L-spine MRI (9/14/20): Degen. disc dz of L-spine predominantly in L3-L4, L4-

  L5. No evid of spondylolisthesis, spondylosis, severe stenosis; sacralization 

  of L5. L3-L4 R side foraminal herniation w/ mass effect on R nerve root in 

  foramen. L4-L5 R-side herniation, smaller in size indenting on R nerve root in

  foramen w/ bulging annulus.


* CT head (9/14/20): neg





ASSESSMENT/PLAN:


59 year old male with pmhx of HTN/HLD, schizophrenia, DM presented to the ED 

with compression of L3-L4 and L4-L5 nerve root.





#L3-L4/L4-L5 Nerve Root Compression; +RLE numbness


-Neurosurg consulted; to be taken to the OR today 


- continue with pain management


Gabapentin 300 TID





#Schizophrenia; Continue home med: Olanzapine 5 PO HS





#Diabetes mellitus; BGM/ISS ACHS





#Prophylaxis: Lovenox 40 SQ (on hold for OR tomorrow )

## 2020-09-17 NOTE — PN
Physical Exam: 


SUBJECTIVE: Patient seen and examined at bedside; no acute events overnight; 

patient is anxious to go to the OR this AM- he denies any CP/SOB/NV








OBJECTIVE:





                                   Vital Signs











 Period  Temp  Pulse  Resp  BP Sys/Dinero  Pulse Ox


 


 Last 24 Hr  98.6 F-99 F    18-18  135-156/70-90  96-98











GENERAL: The patient is awake, alert, and fully oriented, in no acute distress.


EYES:PEERLA: EOMI no scleral icterus  


NECK: no JVD; no lymphadenopathy


LUNGS: decreased breath sounds at the bases 


HEART: Regular rate and rhythm, S1, S2 without murmur, rub or gallop.


ABDOMEN: Soft, NT ND +BS in all 4 qudrant


EXTREMITIES: 2+ pulses, warm, well-perfused, no edema. 


NEUROLOGICAL: Cranial nerves II through XII grossly intact.RLE 1/5 strength; 

sensation intact; LLE 5/5 strength sensation intact


SKIN: Warm, dry, normal turgor, no rashes or lesions noted














                         Laboratory Results - last 24 hr











  09/16/20 09/16/20 09/16/20





  16:54 17:25 17:25


 


WBC   


 


RBC   


 


Hgb   


 


Hct   


 


MCV   


 


MCH   


 


MCHC   


 


RDW   


 


Plt Count   


 


MPV   


 


Absolute Neuts (auto)   


 


Neutrophils %   


 


Lymphocytes %   


 


Monocytes %   


 


Eosinophils %   


 


Basophils %   


 


Nucleated RBC %   


 


PT with INR   10.80 


 


INR   0.92 


 


PTT (Actin FS)   36.5 


 


Sodium   


 


Potassium   


 


Chloride   


 


Carbon Dioxide   


 


Anion Gap   


 


BUN   


 


Creatinine   


 


Est GFR (CKD-EPI)AfAm   


 


Est GFR (CKD-EPI)NonAf   


 


POC Glucometer  117  


 


Random Glucose   


 


Calcium   


 


Phosphorus   


 


Magnesium   


 


Total Bilirubin   


 


AST   


 


ALT   


 


Alkaline Phosphatase   


 


Total Protein   


 


Albumin   


 


Blood Type    O POSITIVE


 


Antibody Screen    Negative














  09/16/20 09/17/20 09/17/20





  20:50 06:14 07:56


 


WBC    4.9


 


RBC    3.35 L


 


Hgb    10.5 L


 


Hct    32.9 L


 


MCV    98.3 H


 


MCH    31.4


 


MCHC    32.0


 


RDW    12.9


 


Plt Count    212


 


MPV    8.7


 


Absolute Neuts (auto)    2.1


 


Neutrophils %    44.0


 


Lymphocytes %    45.8 H


 


Monocytes %    8.5


 


Eosinophils %    1.2


 


Basophils %    0.5


 


Nucleated RBC %    0


 


PT with INR   


 


INR   


 


PTT (Actin FS)   


 


Sodium   


 


Potassium   


 


Chloride   


 


Carbon Dioxide   


 


Anion Gap   


 


BUN   


 


Creatinine   


 


Est GFR (CKD-EPI)AfAm   


 


Est GFR (CKD-EPI)NonAf   


 


POC Glucometer  208  121 


 


Random Glucose   


 


Calcium   


 


Phosphorus   


 


Magnesium   


 


Total Bilirubin   


 


AST   


 


ALT   


 


Alkaline Phosphatase   


 


Total Protein   


 


Albumin   


 


Blood Type   


 


Antibody Screen   














  09/17/20 09/17/20





  07:56 11:32


 


WBC  


 


RBC  


 


Hgb  


 


Hct  


 


MCV  


 


MCH  


 


MCHC  


 


RDW  


 


Plt Count  


 


MPV  


 


Absolute Neuts (auto)  


 


Neutrophils %  


 


Lymphocytes %  


 


Monocytes %  


 


Eosinophils %  


 


Basophils %  


 


Nucleated RBC %  


 


PT with INR  


 


INR  


 


PTT (Actin FS)  


 


Sodium  142 


 


Potassium  4.4 


 


Chloride  110 H 


 


Carbon Dioxide  31 


 


Anion Gap  1 L 


 


BUN  20.6 H 


 


Creatinine  1.2 


 


Est GFR (CKD-EPI)AfAm  77.33 


 


Est GFR (CKD-EPI)NonAf  66.72 


 


POC Glucometer   127


 


Random Glucose  127 H 


 


Calcium  8.7 


 


Phosphorus  4.0 


 


Magnesium  2.1 


 


Total Bilirubin  0.4 


 


AST  15 


 


ALT  28 


 


Alkaline Phosphatase  43 L 


 


Total Protein  6.2 L 


 


Albumin  2.8 L 


 


Blood Type  


 


Antibody Screen  








Active Medications











Generic Name Dose Route Start Last Admin





  Trade Name Freq  PRN Reason Stop Dose Admin


 


Enoxaparin Sodium  40 mg  09/12/20 10:00  09/16/20 09:44





  Lovenox -  SQ   40 mg





  DAILY ZENOBIA   Administration


 


Gabapentin  300 mg  09/13/20 15:45  09/17/20 14:02





  Neurontin -  PO   Not Given





  TID ZENOBIA  


 


Insulin Aspart  1 vial  09/12/20 07:00  09/17/20 11:34





  Novolog Vial Sliding Scale -  SQ   Not Given





  [facility]  





  Protocol  


 


Olanzapine  5 mg  09/12/20 22:00  09/16/20 21:05





  Zyprexa -  PO   5 mg





  HS ZENOBIA   Administration











ASSESSMENT/PLAN:


IMAGING:


* L-spine MRI (9/12/20): Limited exam. No MRI evid of cauda quina compromise


* CT head (9/12/20): neg


* L-spine MRI (9/14/20): Degen. disc dz of L-spine predominantly in L3-L4, L4-

  L5. No evid of spondylolisthesis, spondylosis, severe stenosis; sacralization 

  of L5. L3-L4 R side foraminal herniation w/ mass effect on R nerve root in 

  foramen. L4-L5 R-side herniation, smaller in size indenting on R nerve root in

  foramen w/ bulging annulus.


* CT head (9/14/20): neg





ASSESSMENT/PLAN:


59M with pmhx of HTN/HLD, schizophrenia, DM presented to the ED with inability 

to ambulate and right lower extremity new numbness. Patient found to have 

compression of L3-L4 and L4-L5 nerve root.





#L3-L4/L4-L5 Nerve Root Compression; +RLE numbness


Repeat L-spine MRI results noted above; L3-L4, L4-L5 R side herniation


-patient going to the OR today for l3-l5 laminectomies; foraminectomies; fusion


Gabapentin 300 TID


-PT





#Schizophrenia; Cont home med: Olanzapine 5 PO HS





#Diabetes mellitus; BGM/ISS ACHS





#Prophylaxis: Lovenox 40 SQ - resume after OR





Dispo


-Cont to monitor on med-surg





Problem List





- Problems


(1) Impaired ambulation


Code(s): R26.2 - DIFFICULTY IN WALKING, NOT ELSEWHERE CLASSIFIED   





(2) Peripheral neuropathy


Code(s): G62.9 - POLYNEUROPATHY, UNSPECIFIED   


Qualifiers: 


   Peripheral neuropathy type: idiopathic neuropathy, unspecified   Qualified 

Code(s): G60.9 - Hereditary and idiopathic neuropathy, unspecified   





(3) Right foot drop


Code(s): M21.371 - FOOT DROP, RIGHT FOOT   





Visit type





- Emergency Visit


Emergency Visit: Yes


ED Registration Date: 09/12/20


Care time: The patient presented to the Emergency Department on the above date 

and was hospitalized for further evaluation of their emergent condition.





- New Patient


This patient is new to me today: No





- Critical Care


Critical Care patient: No





ATTENDING PHYSICIAN STATEMENT





I saw and evaluated the patient.


I reviewed the resident's note and discussed the case with the resident.


I agree with the resident's findings and plan as documented.








SUBJECTIVE:








OBJECTIVE:








ASSESSMENT AND PLAN:

## 2020-09-17 NOTE — OP
Operative Note





- Note:


Operative Date: 09/17/20


Pre-Operative Diagnosis: Ambulatory dysfunction, peripheral neuropathy


Operation: Right L34, L45 hemilaminectomies and decompression with partial L45 

discectomy


Post-Operative Diagnosis: Same as Pre-op


Surgeon: Bobby Bowie


Assistant: Madalyn Ureña


Anesthesiologist/CRNA: Brenda Dumont


Anesthesia: General, Local


Estimated Blood Loss (mls): 350


Operative Report Dictated: Yes

## 2020-09-18 LAB
ALBUMIN SERPL-MCNC: 2.7 G/DL (ref 3.4–5)
ALP SERPL-CCNC: 42 U/L (ref 45–117)
ALT SERPL-CCNC: 24 U/L (ref 13–61)
ANION GAP SERPL CALC-SCNC: 5 MMOL/L (ref 8–16)
AST SERPL-CCNC: 17 U/L (ref 15–37)
BASOPHILS # BLD: 0.2 % (ref 0–2)
BILIRUB SERPL-MCNC: 0.4 MG/DL (ref 0.2–1)
BUN SERPL-MCNC: 23.7 MG/DL (ref 7–18)
CALCIUM SERPL-MCNC: 8.4 MG/DL (ref 8.5–10.1)
CHLORIDE SERPL-SCNC: 109 MMOL/L (ref 98–107)
CO2 SERPL-SCNC: 26 MMOL/L (ref 21–32)
CREAT SERPL-MCNC: 1.5 MG/DL (ref 0.55–1.3)
DEPRECATED RDW RBC AUTO: 13 % (ref 11.9–15.9)
EOSINOPHIL # BLD: 0.1 % (ref 0–4.5)
GLUCOSE SERPL-MCNC: 157 MG/DL (ref 74–106)
HCT VFR BLD CALC: 30.1 % (ref 35.4–49)
HGB BLD-MCNC: 9.8 GM/DL (ref 11.7–16.9)
LYMPHOCYTES # BLD: 16.4 % (ref 8–40)
MAGNESIUM SERPL-MCNC: 1.9 MG/DL (ref 1.8–2.4)
MCH RBC QN AUTO: 32.2 PG (ref 25.7–33.7)
MCHC RBC AUTO-ENTMCNC: 32.7 G/DL (ref 32–35.9)
MCV RBC: 98.5 FL (ref 80–96)
MONOCYTES # BLD AUTO: 8.6 % (ref 3.8–10.2)
NEUTROPHILS # BLD: 74.7 % (ref 42.8–82.8)
PHOSPHATE SERPL-MCNC: 4.9 MG/DL (ref 2.5–4.9)
PLATELET # BLD AUTO: 215 K/MM3 (ref 134–434)
PMV BLD: 8.6 FL (ref 7.5–11.1)
POTASSIUM SERPLBLD-SCNC: 5.1 MMOL/L (ref 3.5–5.1)
PROT SERPL-MCNC: 6 G/DL (ref 6.4–8.2)
RBC # BLD AUTO: 3.05 M/MM3 (ref 4–5.6)
SODIUM SERPL-SCNC: 140 MMOL/L (ref 136–145)
WBC # BLD AUTO: 7.4 K/MM3 (ref 4–10)

## 2020-09-18 RX ADMIN — INSULIN ASPART SCH UNITS: 100 INJECTION, SOLUTION INTRAVENOUS; SUBCUTANEOUS at 17:11

## 2020-09-18 RX ADMIN — INSULIN ASPART SCH UNITS: 100 INJECTION, SOLUTION INTRAVENOUS; SUBCUTANEOUS at 21:14

## 2020-09-18 RX ADMIN — CEFAZOLIN SODIUM SCH MLS/HR: 1 INJECTION, SOLUTION INTRAVENOUS at 01:29

## 2020-09-18 RX ADMIN — INSULIN ASPART SCH UNITS: 100 INJECTION, SOLUTION INTRAVENOUS; SUBCUTANEOUS at 07:03

## 2020-09-18 RX ADMIN — DOCUSATE SODIUM SCH MG: 100 CAPSULE, LIQUID FILLED ORAL at 13:23

## 2020-09-18 RX ADMIN — HEPARIN SODIUM SCH UNIT: 5000 INJECTION, SOLUTION INTRAVENOUS; SUBCUTANEOUS at 20:59

## 2020-09-18 RX ADMIN — INSULIN ASPART SCH: 100 INJECTION, SOLUTION INTRAVENOUS; SUBCUTANEOUS at 12:18

## 2020-09-18 RX ADMIN — CEFAZOLIN SCH MLS/HR: 1 INJECTION, POWDER, FOR SOLUTION INTRAVENOUS at 17:59

## 2020-09-18 RX ADMIN — DOCUSATE SODIUM SCH MG: 100 CAPSULE, LIQUID FILLED ORAL at 20:59

## 2020-09-18 RX ADMIN — HEPARIN SODIUM SCH UNIT: 5000 INJECTION, SOLUTION INTRAVENOUS; SUBCUTANEOUS at 07:03

## 2020-09-18 RX ADMIN — GABAPENTIN SCH MG: 300 CAPSULE ORAL at 07:03

## 2020-09-18 RX ADMIN — MORPHINE SULFATE PRN MG: 2 INJECTION, SOLUTION INTRAMUSCULAR; INTRAVENOUS at 10:27

## 2020-09-18 RX ADMIN — GABAPENTIN SCH MG: 300 CAPSULE ORAL at 20:59

## 2020-09-18 RX ADMIN — FOLIC ACID SCH MG: 1 TABLET ORAL at 10:27

## 2020-09-18 RX ADMIN — HEPARIN SODIUM SCH UNIT: 5000 INJECTION, SOLUTION INTRAVENOUS; SUBCUTANEOUS at 13:23

## 2020-09-18 RX ADMIN — ACETAMINOPHEN PRN MG: 325 TABLET ORAL at 20:59

## 2020-09-18 RX ADMIN — DOCUSATE SODIUM SCH MG: 100 CAPSULE, LIQUID FILLED ORAL at 07:03

## 2020-09-18 RX ADMIN — CEFAZOLIN SODIUM SCH MLS/HR: 1 INJECTION, SOLUTION INTRAVENOUS at 10:27

## 2020-09-18 RX ADMIN — FERROUS SULFATE TAB EC 324 MG (65 MG FE EQUIVALENT) SCH MG: 324 (65 FE) TABLET DELAYED RESPONSE at 10:26

## 2020-09-18 RX ADMIN — GABAPENTIN SCH MG: 300 CAPSULE ORAL at 13:23

## 2020-09-18 NOTE — PN
Physical Exam: 


SUBJECTIVE: Patient seen and examined this AM. S/P Laminectomy last night. no 

acute overnight events.








OBJECTIVE:





                                   Vital Signs











 Period  Temp  Pulse  Resp  BP Sys/Dinero  Pulse Ox


 


 Last 24 Hr  97.8 F-98.6 F    16-18  114-167/75-90  











GENERAL: A&Ox3, NAD


EYES: EOMI


NECK: no JVD


LUNGS: Diminished breath sounds at the bases 


HEART: Regular rate and rhythm, S1, S2


ABDOMEN: Obese, Soft, NT ND +BS


EXTREMITIES: 2+ pulses, warm, well-perfused, no edema. 


NEUROLOGICAL: Cranial nerves II through XII grossly intact. Gross sensation 

intact throughout, Minimal movement of RLE


SKIN: Warm, dry





                             Laboratory Last Values











WBC  7.4 K/mm3 (4.0-10.0)   09/18/20  06:15    


 


RBC  3.05 M/mm3 (4.00-5.60)  L  09/18/20  06:15    


 


Hgb  9.8 GM/dL (11.7-16.9)  L  09/18/20  06:15    


 


Hct  30.1 % (35.4-49)  L  09/18/20  06:15    


 


MCV  98.5 fl (80-96)  H  09/18/20  06:15    


 


MCH  32.2 pg (25.7-33.7)   09/18/20  06:15    


 


MCHC  32.7 g/dl (32.0-35.9)   09/18/20  06:15    


 


RDW  13.0 % (11.9-15.9)   09/18/20  06:15    


 


Plt Count  215 K/MM3 (134-434)   09/18/20  06:15    


 


MPV  8.6 fl (7.5-11.1)   09/18/20  06:15    


 


Absolute Neuts (auto)  5.5 K/mm3 (1.5-8.0)   09/18/20  06:15    


 


Neutrophils %  74.7 % (42.8-82.8)  D 09/18/20  06:15    


 


Lymphocytes %  16.4 % (8-40)  D 09/18/20  06:15    


 


Monocytes %  8.6 % (3.8-10.2)   09/18/20  06:15    


 


Eosinophils %  0.1 % (0-4.5)  D 09/18/20  06:15    


 


Basophils %  0.2 % (0-2.0)   09/18/20  06:15    


 


Nucleated RBC %  0 % (0-0)   09/18/20  06:15    


 


PT with INR  10.80 SEC (9.7-13.0)   09/16/20  17:25    


 


INR  0.92  (0.83-1.09)   09/16/20  17:25    


 


PTT (Actin FS)  36.5 SECONDS (25.2-36.5)   09/16/20  17:25    


 


Sodium  140 mmol/L (136-145)   09/18/20  06:15    


 


Potassium  5.1 mmol/L (3.5-5.1)   09/18/20  06:15    


 


Chloride  109 mmol/L ()  H  09/18/20  06:15    


 


Carbon Dioxide  26 mmol/L (21-32)   09/18/20  06:15    


 


Anion Gap  5 MMOL/L (8-16)  L  09/18/20  06:15    


 


BUN  23.7 mg/dL (7-18)  H  09/18/20  06:15    


 


Creatinine  1.5 mg/dL (0.55-1.3)  H  09/18/20  06:15    


 


Est GFR (CKD-EPI)AfAm  59.05   09/18/20  06:15    


 


Est GFR (CKD-EPI)NonAf  50.95   09/18/20  06:15    


 


POC Glucometer  169 UNITS ()   09/18/20  07:02    


 


Random Glucose  157 mg/dL ()  H  09/18/20  06:15    


 


Hemoglobin A1c %  6.5 % (4.2-6.3)  H  09/15/20  07:20    


 


Calcium  8.4 mg/dL (8.5-10.1)  L  09/18/20  06:15    


 


Phosphorus  4.9 mg/dL (2.5-4.9)   09/18/20  06:15    


 


Magnesium  1.9 mg/dL (1.8-2.4)   09/18/20  06:15    


 


Total Bilirubin  0.4 mg/dL (0.2-1)   09/18/20  06:15    


 


AST  17 U/L (15-37)   09/18/20  06:15    


 


ALT  24 U/L (13-61)   09/18/20  06:15    


 


Alkaline Phosphatase  42 U/L ()  L  09/18/20  06:15    


 


Total Protein  6.0 g/dl (6.4-8.2)  L  09/18/20  06:15    


 


Albumin  2.7 g/dl (3.4-5.0)  L  09/18/20  06:15    


 


Urine Color  Yellow   09/12/20  04:45    


 


Urine Appearance  Clear   09/12/20  04:45    


 


Urine pH  5.5  (5.0-8.0)   09/12/20  04:45    


 


Ur Specific Gravity  1.023  (1.010-1.035)   09/12/20  04:45    


 


Urine Protein  Trace  (NEGATIVE)   09/12/20  04:45    


 


Urine Glucose (UA)  2+  (NEGATIVE)  H  09/12/20  04:45    


 


Urine Ketones  Negative  (NEGATIVE)   09/12/20  04:45    


 


Urine Blood  Negative  (NEGATIVE)   09/12/20  04:45    


 


Urine Nitrite  Negative  (NEGATIVE)   09/12/20  04:45    


 


Urine Bilirubin  Negative  (NEGATIVE)   09/12/20  04:45    


 


Urine Urobilinogen  1.0 mg/dL (0.2-1.0)   09/12/20  04:45    


 


Ur Leukocyte Esterase  Negative  (NEGATIVE)   09/12/20  04:45    


 


Opiates Screen  Negative ng/ml (CGJQVF=415)   09/12/20  05:29    


 


Methadone Screen  Negative ng/ml (RIMXDV=915)   09/12/20  05:29    


 


Barbiturate Screen  Negative ng/ml (UDZQJZ=252)   09/12/20  05:29    


 


Phencyclidine Screen  Negative ng/ml (CUTOFF=25)   09/12/20  05:29    


 


Ur Amphetamines Screen  Negative ng/ml (BSEWDF=075)   09/12/20  05:29    


 


MDMA (Ecstasy) Screen  Negative ng/ml (RBPAOO=439)   09/12/20  05:29    


 


Benzodiazepines Screen  Negative ng/ml (KBXFPU=967)   09/12/20  05:29    


 


Cocaine Screen  Negative ng/ml (BZUIBF=585)   09/12/20  05:29    


 


U Marijuana (THC) Screen  Negative ng/ml (CUTOFF=50)   09/12/20  05:29    


 


COVID-19 (KAN)  Not detected  (Not Detected)   09/12/20  04:45    


 


Blood Type  O POSITIVE   09/16/20  17:25    


 


Antibody Screen  Negative   09/16/20  17:25    








                                  Microbiology





09/12/20 04:45   Urine - Urine Clean Catch   Urine Culture - Final


                            NO GROWTH OBTAINED





Active Medications





Diphenhydramine HCl (Benadryl -)  25 mg PO Q6H PRN


   PRN Reason: FOR ITCHING


Docusate Sodium (Colace -)  100 mg PO TID [facility]


   Last Admin: 09/18/20 07:03 Dose:  100 mg


   Documented by: 


Ferrous Sulfate (Feosol -)  325 mg PO DAILY@0800 [facility]


   Last Admin: 09/18/20 10:26 Dose:  325 mg


   Documented by: 


Folic Acid (Folic Acid -)  1 mg PO DAILY [facility]


   Last Admin: 09/18/20 10:27 Dose:  1 mg


   Documented by: 


Gabapentin (Neurontin -)  300 mg PO TID [facility]


   Last Admin: 09/18/20 07:03 Dose:  300 mg


   Documented by: 


Heparin Sodium (Porcine) (Heparin -)  5,000 unit SQ TID [facility]


   Last Admin: 09/18/20 07:03 Dose:  5,000 unit


   Documented by: 


Lactated Ringer's (Lactated Ringers Solution)  1,000 ml in 1,000 mls @ 125 

mls/hr IV ASDIR [facility]


   Last Admin: 09/17/20 23:15 Dose:  125 mls/hr


   Documented by: 


Cefazolin Sodium 1 gm/ (Dextrose)  50 mls @ 100 mls/hr IVPB Q8H-IV [facility]


   Stop: 09/19/20 10:05


Insulin Aspart (Novolog Vial Sliding Scale -)  1 vial SQ [facility]; Protocol


   Last Admin: 09/18/20 07:03 Dose:  2 units


   Documented by: 


Morphine Sulfate (Morphine Sulfate)  2 mg IVPUSH Q4H PRN


   PRN Reason: breakthrough pain


   Last Admin: 09/18/20 10:27 Dose:  2 mg


   Documented by: 


Olanzapine (Zyprexa -)  5 mg PO [facility]


   Last Admin: 09/18/20 00:48 Dose:  5 mg


   Documented by: 


Ondansetron HCl (Zofran Injection)  4 mg IVPUSH Q6H PRN


   PRN Reason: NAUSEA AND/OR VOMITING


Oxycodone HCl (Roxicodone -)  10 mg PO Q6H PRN


   PRN Reason: PAIN LEVEL 6-10


Oxycodone HCl (Roxicodone -)  5 mg PO Q6H PRN


   PRN Reason: PAIN LEVEL 1-5











ASSESSMENT/PLAN:


59M with pmhx of HTN/HLD, schizophrenia, DM presented to the ED with inability 

to ambulate and new onset RLE Numbness, found to have compression of L3-L4 and 

L4-L5 nerve root, no s/p Nerve Root Decompression on 9/17.





#L3-L4/L4-L5 Nerve Root Compression; +RLE numbness


Repeat L-spine MRI results noted above; L3-L4, L4-L5 R side herniation


-S/P Right L34, L45 hemilaminectomies and decompression with partial L45 

discectomy on 9/17


Analgesia


-Incentive Spirometer


-PT as per surgery rec's


-Maintain TLSO Brace


-Bowel regimen





#SUGAR


-Likely due to dehydration in the setting of above surgery


-Continue IV Hydration


-Monitor Urine output


-Will consider checking UA, Kidney Renal US, Urine Lytes (Sodium, Potassium, Cr,

urea) and calculating FeNa if Cr continues to trend up


-Avoid Nephrotoxic medication





#Schizophrenia; Cont home med: Olanzapine 5 PO HS





#Diabetes mellitus; BGM/ISS ACHS





#Prophylaxis: Lovenox 40 SQ - resume after OR





Dispo


-Transfer to med-surg, Can DC Tele








Visit type





- Emergency Visit


Emergency Visit: Yes


ED Registration Date: 09/12/20


Care time: The patient presented to the Emergency Department on the above date 

and was hospitalized for further evaluation of their emergent condition.





- New Patient


This patient is new to me today: Yes


Date on this admission: 09/18/20





- Critical Care


Critical Care patient: No





- Discharge Referral


Referred to [facility] No





ATTENDING PHYSICIAN STATEMENT





I saw and evaluated the patient.


I reviewed the resident's note and discussed the case with the resident.


I agree with the resident's findings and plan as documented.








SUBJECTIVE:








OBJECTIVE:








ASSESSMENT AND PLAN:

## 2020-09-18 NOTE — PN
Progress Note (short form)





- Note


Progress Note: 





Anesthesia Post op


Pt seen and examined


S:Alert and awake comfortable state he cannot move his legs


O:


                                   Vital Signs











Temperature  98.1 F   09/18/20 06:00


 


Pulse Rate  89   09/18/20 06:00


 


Respiratory Rate  18   09/18/20 06:00


 


Blood Pressure  114/75   09/18/20 06:00


 


O2 Sat by Pulse Oximetry (%)  99   09/18/20 06:00








                                    CBC, BMP





                                 09/18/20 06:15 





                                 09/18/20 06:15 








A/P:


Current Active Problems





Impaired ambulation (Acute)


Peripheral neuropathy (Acute)


Right foot drop (Acute)





s/pRight L3-5 hemilaminectomy


discussed pt staus with Dr. Mathew


Continue current care


Ranjith High MD

## 2020-09-18 NOTE — PN
Progress Note (short form)





- Note


Progress Note: 





Surgery








POD #1 Right L34, L45 hemilaminectomies and decompression with partial L45 

discectomy. Patient seen and examined on AM rounds. C/o some incisional and 

right sides LBP radiating to hip but denies any radicular symptoms or 

paresthesias. He is still unable to move his right leg. He is tolerating his 

diet and denies any CP, SEBASTIAN, N/V fever or chills. He has not been OOB with PT 

and still has his ramirez in place. 








                                   Vital Signs











Temp  98.1 F   09/18/20 06:00


 


Pulse  89   09/18/20 06:00


 


Resp  18   09/18/20 06:00


 


BP  114/75   09/18/20 06:00


 


Pulse Ox  99   09/18/20 06:00








                                 Intake & Output











 09/17/20 09/17/20 09/18/20





 11:59 23:59 11:59


 


Intake Total  1110 1195


 


Output Total  950 


 


Balance  160 1195


 


Intake:   


 


  IV  1110 875


 


    LACTATED RINGERS SOLUTION  10 875





    1,000 ml In 1,000 ml @   





    125 mls/hr IV ASDIR ZENOBIA   





    Rx#:[id number]   


 


  Oral   320


 


Output:   


 


  Urine  600 


 


  Estimated Blood Loss  350 


 


Other:   


 


  Voiding Method Urinal Indwelling Catheter Indwelling Catheter


 


  # Unmeasured Voids   


 


    Void 2  


 


  Bowel Movement No No 











                                    CBC, BMP





                                 09/18/20 06:15 





                                 09/18/20 06:15 





 PE:


A&Ox3, NAD


Unlabored resp on RA


Lumbar dressing C/D/I with no evidence of tracking erythema, edema, collection 

or active d/c.


Right LE with sensation to light touch intact throughout, some discrete 

initiation of plantar flexion, no dorsi flexion, unable to SLR


Left LE 5/5 strength on dorsi/plantar flexion and able to SLE.


B/L LE compartments soft, supple and non-tender with +2DP pulses.





Problem List





- Problems


(1) Peripheral neuropathy


Assessment/Plan: 


POD #3 doing well still unable to move right LE although some initiation of 

dorsi flexion.





-OOB with PT- fall risk, full assist WBAT


-DVT prophylaxis


- encourage IS


-Pain control


-trend daily labs





Evaluation and plan discussed with Dr Bowie


Code(s): G62.9 - POLYNEUROPATHY, UNSPECIFIED   


Qualifiers: 


   Peripheral neuropathy type: idiopathic neuropathy, unspecified   Qualified 

Code(s): G60.9 - Hereditary and idiopathic neuropathy, unspecified

## 2020-09-19 LAB
ALBUMIN SERPL-MCNC: 2.9 G/DL (ref 3.4–5)
ALP SERPL-CCNC: 46 U/L (ref 45–117)
ALT SERPL-CCNC: 32 U/L (ref 13–61)
ANION GAP SERPL CALC-SCNC: 4 MMOL/L (ref 8–16)
APPEARANCE UR: CLEAR
AST SERPL-CCNC: 22 U/L (ref 15–37)
BACTERIA # UR AUTO: 8 /UL (ref 0–1359)
BASOPHILS # BLD: 0.3 % (ref 0–2)
BILIRUB SERPL-MCNC: 0.6 MG/DL (ref 0.2–1)
BILIRUB UR STRIP.AUTO-MCNC: NEGATIVE MG/DL
BUN SERPL-MCNC: 21.2 MG/DL (ref 7–18)
CALCIUM SERPL-MCNC: 8.5 MG/DL (ref 8.5–10.1)
CASTS URNS QL MICRO: 0 /UL (ref 0–3.1)
CHLORIDE SERPL-SCNC: 110 MMOL/L (ref 98–107)
CO2 SERPL-SCNC: 27 MMOL/L (ref 21–32)
COLOR UR: YELLOW
CREAT SERPL-MCNC: 1.3 MG/DL (ref 0.55–1.3)
DEPRECATED RDW RBC AUTO: 13 % (ref 11.9–15.9)
EOSINOPHIL # BLD: 0.5 % (ref 0–4.5)
EPITH CASTS URNS QL MICRO: 2 /UL (ref 0–25.1)
GLUCOSE SERPL-MCNC: 158 MG/DL (ref 74–106)
HCT VFR BLD CALC: 29.3 % (ref 35.4–49)
HGB BLD-MCNC: 9.8 GM/DL (ref 11.7–16.9)
KETONES UR QL STRIP: NEGATIVE
LEUKOCYTE ESTERASE UR QL STRIP.AUTO: NEGATIVE
LYMPHOCYTES # BLD: 14.1 % (ref 8–40)
MAGNESIUM SERPL-MCNC: 1.8 MG/DL (ref 1.8–2.4)
MCH RBC QN AUTO: 33 PG (ref 25.7–33.7)
MCHC RBC AUTO-ENTMCNC: 33.6 G/DL (ref 32–35.9)
MCV RBC: 98.2 FL (ref 80–96)
MONOCYTES # BLD AUTO: 12.2 % (ref 3.8–10.2)
NEUTROPHILS # BLD: 72.9 % (ref 42.8–82.8)
NITRITE UR QL STRIP: NEGATIVE
PH UR: 5.5 [PH] (ref 5–8)
PHOSPHATE SERPL-MCNC: 3.2 MG/DL (ref 2.5–4.9)
PLATELET # BLD AUTO: 203 K/MM3 (ref 134–434)
PMV BLD: 9.2 FL (ref 7.5–11.1)
POTASSIUM SERPLBLD-SCNC: 4.3 MMOL/L (ref 3.5–5.1)
PROT SERPL-MCNC: 6.4 G/DL (ref 6.4–8.2)
PROT UR QL STRIP: (no result)
PROT UR QL STRIP: (no result)
RBC # BLD AUTO: 16 /UL (ref 0–23.9)
RBC # BLD AUTO: 2.98 M/MM3 (ref 4–5.6)
SODIUM SERPL-SCNC: 141 MMOL/L (ref 136–145)
SP GR UR: 1.02 (ref 1.01–1.03)
UROBILINOGEN UR STRIP-MCNC: 1 MG/DL (ref 0.2–1)
WBC # BLD AUTO: 9.3 K/MM3 (ref 4–10)
WBC # UR AUTO: 2 /UL (ref 0–25.8)

## 2020-09-19 RX ADMIN — GABAPENTIN SCH MG: 300 CAPSULE ORAL at 14:09

## 2020-09-19 RX ADMIN — DOCUSATE SODIUM SCH MG: 100 CAPSULE, LIQUID FILLED ORAL at 14:09

## 2020-09-19 RX ADMIN — HEPARIN SODIUM SCH UNIT: 5000 INJECTION, SOLUTION INTRAVENOUS; SUBCUTANEOUS at 06:22

## 2020-09-19 RX ADMIN — CEFAZOLIN SCH MLS/HR: 1 INJECTION, POWDER, FOR SOLUTION INTRAVENOUS at 02:26

## 2020-09-19 RX ADMIN — INSULIN ASPART SCH: 100 INJECTION, SOLUTION INTRAVENOUS; SUBCUTANEOUS at 16:33

## 2020-09-19 RX ADMIN — DOCUSATE SODIUM SCH MG: 100 CAPSULE, LIQUID FILLED ORAL at 06:23

## 2020-09-19 RX ADMIN — INSULIN ASPART SCH UNITS: 100 INJECTION, SOLUTION INTRAVENOUS; SUBCUTANEOUS at 06:24

## 2020-09-19 RX ADMIN — INSULIN ASPART SCH UNITS: 100 INJECTION, SOLUTION INTRAVENOUS; SUBCUTANEOUS at 11:41

## 2020-09-19 RX ADMIN — HEPARIN SODIUM SCH UNIT: 5000 INJECTION, SOLUTION INTRAVENOUS; SUBCUTANEOUS at 20:59

## 2020-09-19 RX ADMIN — DOCUSATE SODIUM SCH MG: 100 CAPSULE, LIQUID FILLED ORAL at 21:01

## 2020-09-19 RX ADMIN — MORPHINE SULFATE PRN MG: 2 INJECTION, SOLUTION INTRAMUSCULAR; INTRAVENOUS at 06:36

## 2020-09-19 RX ADMIN — FOLIC ACID SCH MG: 1 TABLET ORAL at 08:48

## 2020-09-19 RX ADMIN — FOLIC ACID SCH: 1 TABLET ORAL at 09:45

## 2020-09-19 RX ADMIN — HEPARIN SODIUM SCH UNIT: 5000 INJECTION, SOLUTION INTRAVENOUS; SUBCUTANEOUS at 14:09

## 2020-09-19 RX ADMIN — ACETAMINOPHEN PRN MG: 325 TABLET ORAL at 15:36

## 2020-09-19 RX ADMIN — FERROUS SULFATE TAB EC 324 MG (65 MG FE EQUIVALENT) SCH MG: 324 (65 FE) TABLET DELAYED RESPONSE at 08:27

## 2020-09-19 RX ADMIN — ACETAMINOPHEN PRN MG: 325 TABLET ORAL at 08:48

## 2020-09-19 RX ADMIN — INSULIN ASPART SCH UNITS: 100 INJECTION, SOLUTION INTRAVENOUS; SUBCUTANEOUS at 21:00

## 2020-09-19 RX ADMIN — GABAPENTIN SCH MG: 300 CAPSULE ORAL at 21:00

## 2020-09-19 RX ADMIN — CEFAZOLIN SCH MLS/HR: 1 INJECTION, POWDER, FOR SOLUTION INTRAVENOUS at 09:45

## 2020-09-19 RX ADMIN — GABAPENTIN SCH MG: 300 CAPSULE ORAL at 06:22

## 2020-09-20 LAB
ANION GAP SERPL CALC-SCNC: 5 MMOL/L (ref 8–16)
BASOPHILS # BLD: 0.5 % (ref 0–2)
BUN SERPL-MCNC: 18.6 MG/DL (ref 7–18)
CALCIUM SERPL-MCNC: 8.7 MG/DL (ref 8.5–10.1)
CHLORIDE SERPL-SCNC: 109 MMOL/L (ref 98–107)
CO2 SERPL-SCNC: 25 MMOL/L (ref 21–32)
CREAT SERPL-MCNC: 1.4 MG/DL (ref 0.55–1.3)
DEPRECATED RDW RBC AUTO: 13.2 % (ref 11.9–15.9)
EOSINOPHIL # BLD: 1.5 % (ref 0–4.5)
GLUCOSE SERPL-MCNC: 232 MG/DL (ref 74–106)
HCT VFR BLD CALC: 31.1 % (ref 35.4–49)
HGB BLD-MCNC: 10.4 GM/DL (ref 11.7–16.9)
LYMPHOCYTES # BLD: 22.2 % (ref 8–40)
MCH RBC QN AUTO: 33.1 PG (ref 25.7–33.7)
MCHC RBC AUTO-ENTMCNC: 33.3 G/DL (ref 32–35.9)
MCV RBC: 99.3 FL (ref 80–96)
MONOCYTES # BLD AUTO: 10.2 % (ref 3.8–10.2)
NEUTROPHILS # BLD: 65.6 % (ref 42.8–82.8)
PLATELET # BLD AUTO: 216 K/MM3 (ref 134–434)
PMV BLD: 9 FL (ref 7.5–11.1)
POTASSIUM SERPLBLD-SCNC: 4.3 MMOL/L (ref 3.5–5.1)
RBC # BLD AUTO: 3.13 M/MM3 (ref 4–5.6)
SODIUM SERPL-SCNC: 139 MMOL/L (ref 136–145)
WBC # BLD AUTO: 9.4 K/MM3 (ref 4–10)

## 2020-09-20 RX ADMIN — MORPHINE SULFATE PRN MG: 2 INJECTION, SOLUTION INTRAMUSCULAR; INTRAVENOUS at 10:18

## 2020-09-20 RX ADMIN — GABAPENTIN SCH MG: 300 CAPSULE ORAL at 21:37

## 2020-09-20 RX ADMIN — FERROUS SULFATE TAB EC 324 MG (65 MG FE EQUIVALENT) SCH MG: 324 (65 FE) TABLET DELAYED RESPONSE at 07:59

## 2020-09-20 RX ADMIN — DOCUSATE SODIUM SCH MG: 100 CAPSULE, LIQUID FILLED ORAL at 13:27

## 2020-09-20 RX ADMIN — MORPHINE SULFATE PRN MG: 2 INJECTION, SOLUTION INTRAMUSCULAR; INTRAVENOUS at 01:12

## 2020-09-20 RX ADMIN — HEPARIN SODIUM SCH UNIT: 5000 INJECTION, SOLUTION INTRAVENOUS; SUBCUTANEOUS at 13:27

## 2020-09-20 RX ADMIN — DOCUSATE SODIUM SCH MG: 100 CAPSULE, LIQUID FILLED ORAL at 05:25

## 2020-09-20 RX ADMIN — INSULIN ASPART SCH UNITS: 100 INJECTION, SOLUTION INTRAVENOUS; SUBCUTANEOUS at 21:36

## 2020-09-20 RX ADMIN — INSULIN ASPART SCH UNITS: 100 INJECTION, SOLUTION INTRAVENOUS; SUBCUTANEOUS at 06:01

## 2020-09-20 RX ADMIN — MORPHINE SULFATE PRN MG: 2 INJECTION, SOLUTION INTRAMUSCULAR; INTRAVENOUS at 17:41

## 2020-09-20 RX ADMIN — GABAPENTIN SCH MG: 300 CAPSULE ORAL at 13:28

## 2020-09-20 RX ADMIN — HEPARIN SODIUM SCH UNIT: 5000 INJECTION, SOLUTION INTRAVENOUS; SUBCUTANEOUS at 21:36

## 2020-09-20 RX ADMIN — HEPARIN SODIUM SCH UNIT: 5000 INJECTION, SOLUTION INTRAVENOUS; SUBCUTANEOUS at 05:26

## 2020-09-20 RX ADMIN — FOLIC ACID SCH MG: 1 TABLET ORAL at 10:18

## 2020-09-20 RX ADMIN — INSULIN ASPART SCH UNITS: 100 INJECTION, SOLUTION INTRAVENOUS; SUBCUTANEOUS at 16:43

## 2020-09-20 RX ADMIN — GABAPENTIN SCH MG: 300 CAPSULE ORAL at 05:26

## 2020-09-20 RX ADMIN — INSULIN ASPART SCH UNITS: 100 INJECTION, SOLUTION INTRAVENOUS; SUBCUTANEOUS at 10:24

## 2020-09-20 RX ADMIN — DOCUSATE SODIUM SCH MG: 100 CAPSULE, LIQUID FILLED ORAL at 21:36

## 2020-09-20 NOTE — PN
Progress Note (short form)





- Note


Progress Note: 


SUBJECTIVE:


Seen and examined at bedside.  Patient now able to wiggle toes and leg, 3 out of

5 strength.  Fever downtrending, T-max overnight 100.1.  Remains tachycardic.  

Will get EKG








OBJECTIVE


                                Last Vital Signs











Temp Pulse Resp BP Pulse Ox


 


 98.7 F   112 H  18   130/77   95 


 


 09/20/20 08:43  09/20/20 08:43  09/20/20 08:43  09/20/20 08:43  09/20/20 08:43





PE


GEN: NAD


HEENT: NC/AT [facility]


RESP: CTAB


CARDS: RRR, -MRG


ABD: soft, nt/nd +BS


EXT: No swelling/Edema


Neuro: Non-focal, A&OX3





Labs/Imaging: reviewed





ASSESSMENT/PLAN


ASSESSMENT/PLAN:


59M with pmhx of HTN/HLD, schizophrenia, DM presented to the ED with inability 

to ambulate and new onset RLE Numbness, found to have compression of L3-L4 and 

L4-L5 nerve root, no s/p Nerve Root Decompression on 9/17.





#L3-L4/L4-L5 Nerve Root Compression; +RLE numbness


Post op day #4


-neurosurgery on board


-pain control


-PT





#Post operative fever


pt does not appear ill and is asymptomatic.  CXR shows some atelectasis 


-will observe off abx for now


-incentive spirometer 





#SUGAR: resolved





#Schizophrenia; Cont home med: Olanzapine 5 PO HS





#Diabetes mellitus; BGM/ISS ACHS





#Prophylaxis: Lovenox 40 SQ - resume after OR











Visit type





- Emergency Visit


Emergency Visit: Yes


ED Registration Date: 09/12/20


Care time: The patient presented to the Emergency Department on the above date 

and was hospitalized for further evaluation of their emergent condition.





- New Patient


This patient is new to me today: No





- Critical Care


Critical Care patient: No

## 2020-09-20 NOTE — PN
Progress Note (short form)





- Note


Progress Note: 


I saw and examined the patient on 9/19/2020 but forgot to write a note.  This 

note reflects my observations and findings as of 9/20/2020





SUBJECTIVE:


Seen and examined at bedside.  Patient still unable to move right leg.  Has 

sensation throughout the right lower extremity.  Noted fever overnight of 101.  

Subsequent fever work-up sent this morning including blood cultures, urine 

cultures and chest x-ray.  Chest x-ray shows some atelectasis" pleural 

reaction."





OBJECTIVE


                                Last Vital Signs











Temp Pulse Resp BP Pulse Ox


 


 98.6 F   84   18   151/86   96 


 


 09/15/20 09:00  09/15/20 09:00  09/15/20 09:00  09/15/20 09:00  09/15/20 09:00











PE: Per resident note


Labs/Imaging: reviewed





ASSESSMENT/PLAN


ASSESSMENT/PLAN:


59M with pmhx of HTN/HLD, schizophrenia, DM presented to the ED with inability 

to ambulate and new onset RLE Numbness, found to have compression of L3-L4 and 

L4-L5 nerve root, no s/p Nerve Root Decompression on 9/17.





#L3-L4/L4-L5 Nerve Root Compression; +RLE numbness


Post op day #3


-neurosurgery on board


-pain control


-PT





#Post operative fever


pt does not appear ill and is asymptomatic.  CXR shows some atelectasis 


-will observe off abx for now


-incentive spirometer 





#SUGAR: resolved





#Schizophrenia; Cont home med: Olanzapine 5 PO HS





#Diabetes mellitus; BGM/ISS ACHS





#Prophylaxis: Lovenox 40 SQ - resume after OR














Visit type





- Emergency Visit


Emergency Visit: Yes


ED Registration Date: 09/12/20


Care time: The patient presented to the Emergency Department on the above date 

and was hospitalized for further evaluation of their emergent condition.





- New Patient


This patient is new to me today: No





- Critical Care


Critical Care patient: No

## 2020-09-21 LAB
ANION GAP SERPL CALC-SCNC: 5 MMOL/L (ref 8–16)
BASOPHILS # BLD: 0.3 % (ref 0–2)
BUN SERPL-MCNC: 19.1 MG/DL (ref 7–18)
CALCIUM SERPL-MCNC: 8.4 MG/DL (ref 8.5–10.1)
CHLORIDE SERPL-SCNC: 109 MMOL/L (ref 98–107)
CO2 SERPL-SCNC: 26 MMOL/L (ref 21–32)
CREAT SERPL-MCNC: 1.2 MG/DL (ref 0.55–1.3)
DEPRECATED RDW RBC AUTO: 13 % (ref 11.9–15.9)
EOSINOPHIL # BLD: 1.7 % (ref 0–4.5)
GLUCOSE SERPL-MCNC: 171 MG/DL (ref 74–106)
HCT VFR BLD CALC: 28.1 % (ref 35.4–49)
HGB BLD-MCNC: 9.2 GM/DL (ref 11.7–16.9)
LYMPHOCYTES # BLD: 16.5 % (ref 8–40)
MCH RBC QN AUTO: 32 PG (ref 25.7–33.7)
MCHC RBC AUTO-ENTMCNC: 32.7 G/DL (ref 32–35.9)
MCV RBC: 97.7 FL (ref 80–96)
MONOCYTES # BLD AUTO: 7.7 % (ref 3.8–10.2)
NEUTROPHILS # BLD: 73.8 % (ref 42.8–82.8)
PLATELET # BLD AUTO: 227 K/MM3 (ref 134–434)
PMV BLD: 9.2 FL (ref 7.5–11.1)
POTASSIUM SERPLBLD-SCNC: 4.4 MMOL/L (ref 3.5–5.1)
RBC # BLD AUTO: 2.88 M/MM3 (ref 4–5.6)
SODIUM SERPL-SCNC: 140 MMOL/L (ref 136–145)
WBC # BLD AUTO: 7.3 K/MM3 (ref 4–10)

## 2020-09-21 RX ADMIN — GABAPENTIN SCH MG: 300 CAPSULE ORAL at 13:53

## 2020-09-21 RX ADMIN — DOCUSATE SODIUM SCH MG: 100 CAPSULE, LIQUID FILLED ORAL at 06:39

## 2020-09-21 RX ADMIN — INSULIN ASPART SCH UNITS: 100 INJECTION, SOLUTION INTRAVENOUS; SUBCUTANEOUS at 11:56

## 2020-09-21 RX ADMIN — INSULIN ASPART SCH UNITS: 100 INJECTION, SOLUTION INTRAVENOUS; SUBCUTANEOUS at 21:20

## 2020-09-21 RX ADMIN — FOLIC ACID SCH MG: 1 TABLET ORAL at 09:52

## 2020-09-21 RX ADMIN — DOCUSATE SODIUM SCH MG: 100 CAPSULE, LIQUID FILLED ORAL at 21:20

## 2020-09-21 RX ADMIN — HEPARIN SODIUM SCH UNIT: 5000 INJECTION, SOLUTION INTRAVENOUS; SUBCUTANEOUS at 06:41

## 2020-09-21 RX ADMIN — HEPARIN SODIUM SCH UNIT: 5000 INJECTION, SOLUTION INTRAVENOUS; SUBCUTANEOUS at 21:20

## 2020-09-21 RX ADMIN — MORPHINE SULFATE PRN MG: 2 INJECTION, SOLUTION INTRAMUSCULAR; INTRAVENOUS at 19:25

## 2020-09-21 RX ADMIN — MORPHINE SULFATE PRN MG: 2 INJECTION, SOLUTION INTRAMUSCULAR; INTRAVENOUS at 03:11

## 2020-09-21 RX ADMIN — INSULIN ASPART SCH UNITS: 100 INJECTION, SOLUTION INTRAVENOUS; SUBCUTANEOUS at 17:16

## 2020-09-21 RX ADMIN — INSULIN ASPART SCH UNITS: 100 INJECTION, SOLUTION INTRAVENOUS; SUBCUTANEOUS at 06:39

## 2020-09-21 RX ADMIN — GABAPENTIN SCH MG: 300 CAPSULE ORAL at 06:39

## 2020-09-21 RX ADMIN — FERROUS SULFATE TAB EC 324 MG (65 MG FE EQUIVALENT) SCH MG: 324 (65 FE) TABLET DELAYED RESPONSE at 09:52

## 2020-09-21 RX ADMIN — DOCUSATE SODIUM SCH MG: 100 CAPSULE, LIQUID FILLED ORAL at 13:52

## 2020-09-21 RX ADMIN — GABAPENTIN SCH MG: 300 CAPSULE ORAL at 21:20

## 2020-09-21 RX ADMIN — HEPARIN SODIUM SCH UNIT: 5000 INJECTION, SOLUTION INTRAVENOUS; SUBCUTANEOUS at 13:52

## 2020-09-21 NOTE — EKG
Test Reason : 

Blood Pressure : ***/*** mmHG

Vent. Rate : 111 BPM     Atrial Rate : 111 BPM

   P-R Int : 146 ms          QRS Dur : 082 ms

    QT Int : 306 ms       P-R-T Axes : 041 000 140 degrees

   QTc Int : 416 ms

 

SINUS TACHYCARDIA WITH OCCASIONAL PREMATURE VENTRICULAR COMPLEXES

T WAVE ABNORMALITY, CONSIDER LATERAL ISCHEMIA

ABNORMAL ECG

WHEN COMPARED WITH ECG OF 11-SEP-2020 23:46,

PREMATURE VENTRICULAR COMPLEXES ARE NOW PRESENT

Confirmed by ELVIS KULKARNI, KOSTAS ([id number]) on 9/21/2020 11:05:31 AM

 

Referred By:             Confirmed By:KOSTAS LEAL MD

## 2020-09-21 NOTE — PN
Physical Exam: 


SUBJECTIVE: Patient seen and examined at bedside. No acute events overnight. 








OBJECTIVE:





                                   Vital Signs











 Period  Temp  Pulse  Resp  BP Sys/Dinero  Pulse Ox


 


 Last 24 Hr  97.9 F-98.9 F  103-124  20-20  139-181/  96-97











GENERAL: NAD


HEAD: Normal with no signs of trauma.


EYES: EOMI Sclera Clear 


ENT: MMM


LUNGS: CTAB


HEART: Tachycardic S1S2 no MRG


ABDOMEN: Soft, nontender, nondistended. 


EXTREMITIES: No CCE


NEUROLOGICAL: SILT both lower extremities. Sensation however decreased right 

toes. Strength 1/5 RLE. Unable to lift leg against gravity. Dorsi/Plantar Left 

foot 2+. Absent Dorsi/Plantar Right foot


PSYCH: Normal mood, normal affect.


SKIN: Warm, dry, normal turgor, no rashes or lesions noted














                         Laboratory Results - last 24 hr











  09/20/20 09/21/20 09/21/20





  21:31 05:51 06:58


 


WBC    7.3


 


RBC    2.88 L


 


Hgb    9.2 L


 


Hct    28.1 L


 


MCV    97.7 H


 


MCH    32.0


 


MCHC    32.7


 


RDW    13.0


 


Plt Count    227


 


MPV    9.2


 


Absolute Neuts (auto)    5.4


 


Neutrophils %    73.8


 


Lymphocytes %    16.5  D


 


Monocytes %    7.7


 


Eosinophils %    1.7


 


Basophils %    0.3


 


Nucleated RBC %    0


 


Sodium   


 


Potassium   


 


Chloride   


 


Carbon Dioxide   


 


Anion Gap   


 


BUN   


 


Creatinine   


 


Est GFR (CKD-EPI)AfAm   


 


Est GFR (CKD-EPI)NonAf   


 


POC Glucometer  174  162 


 


Random Glucose   


 


Calcium   














  09/21/20 09/21/20 09/21/20





  06:58 11:47 16:30


 


WBC   


 


RBC   


 


Hgb   


 


Hct   


 


MCV   


 


MCH   


 


MCHC   


 


RDW   


 


Plt Count   


 


MPV   


 


Absolute Neuts (auto)   


 


Neutrophils %   


 


Lymphocytes %   


 


Monocytes %   


 


Eosinophils %   


 


Basophils %   


 


Nucleated RBC %   


 


Sodium  140  


 


Potassium  4.4  


 


Chloride  109 H  


 


Carbon Dioxide  26  


 


Anion Gap  5 L  


 


BUN  19.1 H  


 


Creatinine  1.2  


 


Est GFR (CKD-EPI)AfAm  77.33  


 


Est GFR (CKD-EPI)NonAf  66.72  


 


POC Glucometer   168  156


 


Random Glucose  171 H  


 


Calcium  8.4 L  








Active Medications











Generic Name Dose Route Start Last Admin





  Trade Name Freq  PRN Reason Stop Dose Admin


 


Acetaminophen  650 mg  09/18/20 20:47  09/19/20 15:36





  Tylenol -  PO   650 mg





  Q6H PRN   Administration





  Fever Or Pain  


 


Diphenhydramine HCl  25 mg  09/19/20 20:10 





  Benadryl -  PO  





  Q6H PRN  





  FOR ITCHING  


 


Docusate Sodium  100 mg  09/19/20 22:00  09/21/20 13:52





  Colace -  PO   100 mg





  TID ZENOBIA   Administration


 


Ferrous Sulfate  325 mg  09/20/20 08:00  09/21/20 09:52





  Feosol -  PO   325 mg





  DAILY@0800 ZENOBIA   Administration


 


Folic Acid  1 mg  09/20/20 10:00  09/21/20 09:52





  Folic Acid -  PO   1 mg





  DAILY ZENOBIA   Administration


 


Gabapentin  300 mg  09/19/20 22:00  09/21/20 13:53





  Neurontin -  PO   300 mg





  TID ZENOBIA   Administration


 


Heparin Sodium (Porcine)  5,000 unit  09/19/20 22:00  09/21/20 13:52





  Heparin -  SQ   5,000 unit





  TID ZENOBIA   Administration


 


Insulin Aspart  1 vial  09/19/20 22:00  09/21/20 17:16





  Novolog Vial Sliding Scale -  SQ   2 units





  ACHS ZENOBIA   Administration





  Protocol  


 


Morphine Sulfate  2 mg  09/19/20 20:10  09/21/20 19:25





  Morphine Sulfate  IVPUSH   2 mg





  Q4H PRN   Administration





  breakthrough pain  


 


Olanzapine  5 mg  09/19/20 22:00  09/20/20 21:36





  Zyprexa -  PO   5 mg





  HS ZENOBIA   Administration


 


Ondansetron HCl  4 mg  09/19/20 20:10 





  Zofran Injection  IVPUSH  





  Q6H PRN  





  NAUSEA AND/OR VOMITING  


 


Oxycodone HCl  10 mg  09/19/20 20:10  09/21/20 06:40





  Roxicodone -  PO   10 mg





  Q6H PRN   Administration





  PAIN LEVEL 6-10  


 


Oxycodone HCl  5 mg  09/19/20 20:10 





  Roxicodone -  PO  





  Q6H PRN  





  PAIN LEVEL 1-5  











ASSESSMENT/PLAN:


59M with pmhx of HTN/HLD, schizophrenia, DM presented to the ED with inability 

to ambulate and new onset RLE Numbness, found to have compression of L3-L4 and 

L4-L5 nerve root, no s/p Nerve Root Decompression on 9/17.





#L3-L4/L4-L5 Nerve Root Compression; +RLE numbness


Repeat L-spine MRI results noted above; L3-L4, L4-L5 R side herniation


-S/P Right L34, L45 hemilaminectomies and decompression with partial L45 

discectomy on 9/17


Analgesia


-Incentive Spirometer


-PT 


-Bowel regimen


-Clear for D/C to SNF per surgery 





#SUGAR


-Resolved





#Schizophrenia; Cont home med: Olanzapine 5 PO HS





#Diabetes mellitus; BGM/ISS ACHS





#Prophylaxis: Lovenox 40 SQ 





Dispo


-D/C to SNF





Visit type





- Emergency Visit


Emergency Visit: Yes


ED Registration Date: 09/12/20


Care time: The patient presented to the Emergency Department on the above date 

and was hospitalized for further evaluation of their emergent condition.





- New Patient


This patient is new to me today: No





- Critical Care


Critical Care patient: No





- Discharge Referral


Referred to [facility] No





ATTENDING PHYSICIAN STATEMENT





I saw and evaluated the patient.


I reviewed the resident's note and discussed the case with the resident.


I agree with the resident's findings and plan as documented.








SUBJECTIVE:








OBJECTIVE:








ASSESSMENT AND PLAN:

## 2020-09-21 NOTE — PN
Teaching Attending Note


Name of Resident: Jose Novoa





ATTENDING PHYSICIAN STATEMENT





I saw and evaluated the patient.


I reviewed the resident's note and discussed the case with the resident.


I agree with the resident's findings and plan as documented.








SUBJECTIVE:


Seen and examined at bedside.  Patient only able to wiggle toes on right leg.  P

er neurosurgery he is cleared from their perspective for discharge to rehab.  

Pending COVID test





OBJECTIVE


                                Last Vital Signs











Temp Pulse Resp BP Pulse Ox


 


 98.4 F   111 H  20   139/92   96 


 


 09/21/20 10:00  09/21/20 10:00  09/21/20 10:00  09/21/20 10:00  09/21/20 10:00











PE


GEN: NAD


HEENT: NC/AT [facility]


RESP: CTAB


CARDS: RRR, -MRG


ABD: soft, nt/nd +BS


EXT: No swelling/Edema


Neuro: Non-focal, A&OX3





Labs/Imaging: reviewed





ASSESSMENT/PLAN


ASSESSMENT/PLAN:


59M with pmhx of HTN/HLD, schizophrenia, DM presented to the ED with inability 

to ambulate and new onset RLE Numbness, found to have compression of L3-L4 and 

L4-L5 nerve root, no s/p Nerve Root Decompression on 9/17.





#L3-L4/L4-L5 Nerve Root Compression; +RLE numbness


Post op day #5


-neurosurgery on board


-pain control


-PT





#Post operative fever


pt does not appear ill and is asymptomatic.  CXR shows some atelectasis 


-continue to observe off abx


-incentive spirometer 





#SUGAR: resolved





#Schizophrenia; Cont home med: Olanzapine 5 PO HS





#Diabetes mellitus; BGM/ISS ACHS





#Prophylaxis: Lovenox 40 SQ - resume after OR

## 2020-09-21 NOTE — PN
Progress Note (short form)





- Note


Progress Note: 


SURGERY





s/p Right L34, L45 hemilaminectomies and decompression with partial L45 

discectomy. Patient seen and examined on AM rounds. C/o some incisional and 

right sides LBP radiating to hip but denies any radicular symptoms or 

paresthesias. He is still unable to move his right leg. He is tolerating his 

diet and denies any CP, SEBASTIAN, N/V fever or chills. He has not ambulated with PT, 

but has gotten OOB.  








                                        





 PE:


A&Ox3, NAD


Unlabored resp on RA


Lumbar exam: pt refused exam of incision. 


Right LE with sensation to light touch intact throughout, some discrete 

initiation of plantar flexion, no dorsi flexion, unable to SLR


Left LE 5/5 strength on dorsi/plantar flexion and able to SLE.


B/L LE compartments soft, supple and non-tender with +2DP pulses.





Problem List





- Problems


(1) Peripheral neuropathy


Assessment/Plan: 





-pt is cleared for discharge to Rehab from neurosurgery standpoint


-OOB with PT- fall risk, full assist WBAT


-DVT prophylaxis


- encourage IS


-Pain control


-trend daily labs





Evaluation and plan discussed with Dr Bowie

## 2020-09-22 VITALS — HEART RATE: 118 BPM | DIASTOLIC BLOOD PRESSURE: 96 MMHG | SYSTOLIC BLOOD PRESSURE: 155 MMHG

## 2020-09-22 VITALS — TEMPERATURE: 99.2 F

## 2020-09-22 LAB
ALBUMIN SERPL-MCNC: 2.5 G/DL (ref 3.4–5)
ALP SERPL-CCNC: 52 U/L (ref 45–117)
ALT SERPL-CCNC: 25 U/L (ref 13–61)
ANION GAP SERPL CALC-SCNC: 5 MMOL/L (ref 8–16)
AST SERPL-CCNC: 15 U/L (ref 15–37)
BILIRUB SERPL-MCNC: 0.7 MG/DL (ref 0.2–1)
BUN SERPL-MCNC: 17.3 MG/DL (ref 7–18)
CALCIUM SERPL-MCNC: 8.4 MG/DL (ref 8.5–10.1)
CHLORIDE SERPL-SCNC: 110 MMOL/L (ref 98–107)
CO2 SERPL-SCNC: 27 MMOL/L (ref 21–32)
CREAT SERPL-MCNC: 1.1 MG/DL (ref 0.55–1.3)
DEPRECATED RDW RBC AUTO: 12.7 % (ref 11.9–15.9)
GLUCOSE SERPL-MCNC: 163 MG/DL (ref 74–106)
HCT VFR BLD CALC: 27 % (ref 35.4–49)
HGB BLD-MCNC: 8.8 GM/DL (ref 11.7–16.9)
IRON SERPL-MCNC: 35 UG/DL (ref 50–175)
MAGNESIUM SERPL-MCNC: 2.1 MG/DL (ref 1.8–2.4)
MCH RBC QN AUTO: 31.6 PG (ref 25.7–33.7)
MCHC RBC AUTO-ENTMCNC: 32.5 G/DL (ref 32–35.9)
MCV RBC: 97.2 FL (ref 80–96)
PHOSPHATE SERPL-MCNC: 4.3 MG/DL (ref 2.5–4.9)
PLATELET # BLD AUTO: 253 K/MM3 (ref 134–434)
PMV BLD: 8.4 FL (ref 7.5–11.1)
POTASSIUM SERPLBLD-SCNC: 4.6 MMOL/L (ref 3.5–5.1)
PROT SERPL-MCNC: 6.4 G/DL (ref 6.4–8.2)
RBC # BLD AUTO: 2.78 M/MM3 (ref 4–5.6)
RETICS # AUTO: 2.87 % (ref 0.5–1.5)
SODIUM SERPL-SCNC: 142 MMOL/L (ref 136–145)
TIBC SERPL-MCNC: 210 UG/DL (ref 250–450)
WBC # BLD AUTO: 6.2 K/MM3 (ref 4–10)

## 2020-09-22 RX ADMIN — HEPARIN SODIUM SCH UNIT: 5000 INJECTION, SOLUTION INTRAVENOUS; SUBCUTANEOUS at 05:50

## 2020-09-22 RX ADMIN — INSULIN ASPART SCH UNITS: 100 INJECTION, SOLUTION INTRAVENOUS; SUBCUTANEOUS at 11:27

## 2020-09-22 RX ADMIN — DOCUSATE SODIUM SCH MG: 100 CAPSULE, LIQUID FILLED ORAL at 05:50

## 2020-09-22 RX ADMIN — FOLIC ACID SCH MG: 1 TABLET ORAL at 09:27

## 2020-09-22 RX ADMIN — GABAPENTIN SCH MG: 300 CAPSULE ORAL at 05:53

## 2020-09-22 RX ADMIN — MORPHINE SULFATE PRN MG: 2 INJECTION, SOLUTION INTRAMUSCULAR; INTRAVENOUS at 01:37

## 2020-09-22 RX ADMIN — HEPARIN SODIUM SCH UNIT: 5000 INJECTION, SOLUTION INTRAVENOUS; SUBCUTANEOUS at 13:17

## 2020-09-22 RX ADMIN — ACETAMINOPHEN PRN MG: 325 TABLET ORAL at 03:35

## 2020-09-22 RX ADMIN — GABAPENTIN SCH MG: 300 CAPSULE ORAL at 13:17

## 2020-09-22 RX ADMIN — MORPHINE SULFATE PRN MG: 2 INJECTION, SOLUTION INTRAMUSCULAR; INTRAVENOUS at 09:27

## 2020-09-22 RX ADMIN — DOCUSATE SODIUM SCH MG: 100 CAPSULE, LIQUID FILLED ORAL at 13:17

## 2020-09-22 RX ADMIN — INSULIN ASPART SCH: 100 INJECTION, SOLUTION INTRAVENOUS; SUBCUTANEOUS at 06:03

## 2020-09-22 RX ADMIN — FERROUS SULFATE TAB EC 324 MG (65 MG FE EQUIVALENT) SCH MG: 324 (65 FE) TABLET DELAYED RESPONSE at 08:31

## 2020-09-22 NOTE — DS
Physical Exam: 


SUBJECTIVE: Patient seen and examined at bedside. He does not endorse acute 

complaints. 





OBJECTIVE:





                                   Vital Signs











 Period  Temp  Pulse  Resp  BP Sys/Dinero  Pulse Ox


 


 Last 24 Hr  98.5 F-99.5 F  104-124  18-20  142-181/  95-99








PHYSICAL EXAM





GENERAL: The patient is awake, alert, and fully oriented, in no acute distress.


HEAD: Normal with no signs of trauma.


EYES: PERRL, extraocular movements intact, conjunctiva clear. 


ENT: Oropharynx clear without exudates, moist mucous membranes.


NECK: Trachea midline, supple.


LUNGS: Breath sounds equal, clear to auscultation bilaterally, no wheezes, no 

crackles, no accessory muscle use. 


HEART: Regular rate and rhythm, S1, S2 without murmur, rub or gallop.


ABDOMEN: Soft, nontender, nondistended, normoactive bowel sounds.


EXTREMITIES: Right lower extremity strength 1/5, left lower extremity strength 

5/5. sensation grossly intact bilaterally. 2+ pulses bilaterally.


NEUROLOGICAL: Cranial nerves II through XII grossly intact 


PSYCH: Normal mood, normal affect.


SKIN: Warm, dry





LABS


                         Laboratory Results - last 24 hr











  09/21/20 09/21/20 09/21/20





  15:30 16:30 20:47


 


WBC   


 


RBC   


 


Hgb   


 


Hct   


 


MCV   


 


MCH   


 


MCHC   


 


RDW   


 


Plt Count   


 


MPV   


 


Retic Count   


 


Sodium   


 


Potassium   


 


Chloride   


 


Carbon Dioxide   


 


Anion Gap   


 


BUN   


 


Creatinine   


 


Est GFR (CKD-EPI)AfAm   


 


Est GFR (CKD-EPI)NonAf   


 


POC Glucometer   156  159


 


Random Glucose   


 


Calcium   


 


Phosphorus   


 


Magnesium   


 


Iron   


 


TIBC   


 


Iron Saturation   


 


Unsaturated IBC   


 


Ferritin   


 


Total Bilirubin   


 


AST   


 


ALT   


 


Alkaline Phosphatase   


 


Total Protein   


 


Albumin   


 


COVID-19 ([facility])  Not detected  














  09/22/20 09/22/20 09/22/20





  05:54 06:00 07:22


 


WBC    6.2


 


RBC    2.78 L


 


Hgb    8.8 L


 


Hct    27.0 L


 


MCV    97.2 H


 


MCH    31.6


 


MCHC    32.5


 


RDW    12.7


 


Plt Count    253


 


MPV    8.4


 


Retic Count    2.87 H


 


Sodium   142 


 


Potassium   4.6 


 


Chloride   110 H 


 


Carbon Dioxide   27 


 


Anion Gap   5 L 


 


BUN   17.3 


 


Creatinine   1.1 


 


Est GFR (CKD-EPI)AfAm   85.91 


 


Est GFR (CKD-EPI)NonAf   74.12 


 


POC Glucometer  124  


 


Random Glucose   163 H 


 


Calcium   8.4 L 


 


Phosphorus   4.3 


 


Magnesium   2.1 


 


Iron   35 L 


 


TIBC   210 L 


 


Iron Saturation   16 L 


 


Unsaturated IBC   175 L 


 


Ferritin   260.9 


 


Total Bilirubin   0.7 


 


AST   15 


 


ALT   25 


 


Alkaline Phosphatase   52 


 


Total Protein   6.4 


 


Albumin   2.5 L 


 


COVID-19 ([facility])   














  09/22/20





  11:26


 


WBC 


 


RBC 


 


Hgb 


 


Hct 


 


MCV 


 


MCH 


 


MCHC 


 


RDW 


 


Plt Count 


 


MPV 


 


Retic Count 


 


Sodium 


 


Potassium 


 


Chloride 


 


Carbon Dioxide 


 


Anion Gap 


 


BUN 


 


Creatinine 


 


Est GFR (CKD-EPI)AfAm 


 


Est GFR (CKD-EPI)NonAf 


 


POC Glucometer  167


 


Random Glucose 


 


Calcium 


 


Phosphorus 


 


Magnesium 


 


Iron 


 


TIBC 


 


Iron Saturation 


 


Unsaturated IBC 


 


Ferritin 


 


Total Bilirubin 


 


AST 


 


ALT 


 


Alkaline Phosphatase 


 


Total Protein 


 


Albumin 


 


COVID-19 ([facility]) 











HOSPITAL COURSE:





Date of Admission:09/12/20





Date of Discharge: 09/22/20








Patient is a 59 year old male with history of hypertension, hyperlipidemia, 

diabetes mellitus, schizophrenia presented to ED with complaint of right lower 

extremity numbness. 





Discharge Summary


Problems reviewed: Yes


Reason For Visit: AMBULATORY DYSFUNCTION, PERIPHERAL NEUROPATHY


Current Active Problems





Impaired ambulation (Acute)


Peripheral neuropathy (Acute)


Right foot drop (Acute)








Condition: Guarded





- Instructions


Diet, Activity, Other Instructions: 


You were admitted to the hospital for evaluation of right leg weakness and 

numbness. You were were evaluated by neurosurgeon and underwent surgery to 

correct spinal stenosis. 


You are discharged to [facility] skilled nursing [facility].


 


We found your blood pressure was consistently elevated and have begun 

Carvedeilol 6.25mg daily to control your blood pressure. Discuss this new m

edication with your primary care physician. 


Your blood sugar was elevated, consistent with Diabetes. You will begin taking 

Metformin 500mg twice a day. 


You will take Gabapentin 300mg three times a day for neuropathy pain.


Take vitamins thiamine, and folic acid.


Take Iron supplements to supplement your anemia 


Continue taking your home medications as directed





Follow up with your primary care physician within one -two days after discharge.

A referral has been provided. 


Follow up with Neurologist Dr Amin within one week. A referral has been 

provided. 


Follow up with neurosurgeon Dr. Bowie within one week. A referral has been 

provided. Please see below Dr. Bowie discharge instructions. 





Return to the nearest emergency department if you experience worsening symptoms,

subjective fevers, chills, shortness of breath, chest pain, palpitations, 

abdominal pain, nausea, vomiting, any trauma or loss of consciousness.











Post Operative Instructions





Physical Activity


Resume your normal everyday activity as tolerated. No heavy lifting or exercise 

until seen by your surgeon. You may walk 


unlimited amounts and climb stairs. You may resume driving the car when you feel

safe and comfortable behind the


wheel and you are no longer wearing your brace. Do not operate a vehicle while 

taking narcotic medication.








Wound Care


Keep your incision clean, dry and covered at all times. Apply an occlusive 

dressing (Saran wrap or Tegaderm) when showering


to avoid getting your incision wet. Do not submerge incision or apply ointments 

or creams.











Diet


There are no dietary restrictions. Eat healthy, high-fiber foods. Drink 6-8 

glasses of liquid each day. This will assist


in keeping your bowels regular.





Pain Management


You may take Tylenol or acetaminophen. Any pain prescription medication ordered 

should be taken as prescribed for moderate to


severe pain. Avoid any ibuprofen (Motrin, Advil, Aleve, Toradol, etc) for 3 

months unless otherwise discussed with your surgeon.





Call Dr Copeland for any of the following:


Severe pain not relieved by medication


Fever of 101 or higher


Excessive bleeding or drainage on dressing


Inability to urinate





Any chest pain or shortness of breath, seek Emergency Care.





Call the office to confirm a post-operative appointment for 2-3 weeks post-op





Bobby Bowie MD


[facility]


[address] [place] [address]. [place]


[place] [address]


[phone]





Referrals: 


Bobby Bowie MD, FAANS [Staff Physician] - 


Minor Contreras MD [Primary Care Provider] - 


Christian Amin MD [Staff Physician] - 


Disposition: SKILLED NURSING FACILITY





- Home Medications


Comprehensive Discharge Medication List: 


Ambulatory Orders





Olanzapine [Zyprexa] 5 mg PO HS 09/12/20 


Amlodipine Besylate [Norvasc -] 5 mg PO DAILY  tablet 09/22/20 


Ferrous Sulfate [Feosol] 325 mg PO DAILY@0800  ud 09/22/20 


Folic Acid - 1 mg PO DAILY  tablet 09/22/20 


Gabapentin [Neurontin -] 300 mg PO TID  capsule 09/22/20 


Metformin HCl [Glucophage] 500 mg PO BID 30 Days #60 tablet 09/22/20 











- Discharge Referral


Referred to [facility] No





ATTENDING PHYSICIAN STATEMENT





I saw and evaluated the patient.


I reviewed the resident's note and discussed the case with the resident.


I agree with the resident's findings and plan as documented.








SUBJECTIVE:








OBJECTIVE:








ASSESSMENT AND PLAN:

## 2020-09-22 NOTE — PN
Teaching Attending Note


Name of Resident: Franklin Sam





ATTENDING PHYSICIAN STATEMENT





I saw and evaluated the patient.


I reviewed the resident's note and discussed the case with the resident.


I agree with the resident's findings and plan as documented.








SUBJECTIVE:


Seen and examined at bedside.  Patient is medically cleared for discharge to 

rehab.  Accepted to acute rehab today.





OBJECTIVE


                                Last Vital Signs











Temp Pulse Resp BP Pulse Ox


 


 98.5 F   116 H  18   154/100   98 


 


 09/22/20 08:55  09/22/20 08:55  09/22/20 08:55  09/22/20 08:55  09/22/20 08:55











PE: per resident note


Labs/Imaging: reviewed





ASSESSMENT/PLAN


ASSESSMENT/PLAN:


59M with pmhx of HTN/HLD, schizophrenia, DM presented to the ED with inability 

to ambulate and new onset RLE Numbness, found to have compression of L3-L4 and 

L4-L5 nerve root, no s/p Nerve Root Decompression on 9/17.  Patient's 

postoperative course was complicated by postoperative fever that resolved 

without antibiotics or further treatment.  At time of discharge patient can 

wiggle his toes but still appears to have no tone in his quadriceps or 

hamstrings.  Sensation is equal on both sides.  Patient will be discharged to 

acute rehab on his home medications

## 2020-09-29 NOTE — SURG
Surgery First Assist Note


First Assist: Madalyn Ureña PA-C


Date of Service: 09/17/20


Diagnosis: 





Lumbar degenerative spondylosis w/ stenosis and L4/5 insatbility





Procedure: 





1. Right L4/5 hemilaminectomy and discectomy


2. Right L3/4 hemilaminectomy


3. Fluroscopy


4. Microdissection


5. Bilateral soft tissue advancement flaps (50cm2)


I was present for the entirety of the operative procedure. For further detail, 

please refer to operative report.








Visit type





- Case Type


Case Type: Scheduled





- New patient


This patient is new to me today: Yes


Date on this admission: 09/29/20

## 2024-02-08 NOTE — PN
In an effort to ensure that our patients [employer], a Team Member has reviewed your chart and identified an opportunity to provide the best care possible. An attempt was made to discuss or schedule overdue Preventive or Disease Management screening.     The Outcome was Contact was made, appointment scheduled. Care Gaps include Hypertension.    Progress Note (short form)





- Note


Progress Note: 


Chief Complaint: Events noted, notes reviewed, recurrent lower gastrointestinal 

bleed, sinus tachycardia noted with dropping H/H, denies any chest pain or 

dyspnea, appears weak





History of Present Illness: 


Seen and examined in the ICU. Events noted, notes reviewed, recurrent lower 

gastrointestinal bleed, sinus tachycardia noted with dropping H/H, denies any 

chest pain or dyspnea, appears weak





- Current Medication List


 


 Current Medications





Chlorhexidine Gluconate (Hibiclens For Decolonization -)  1 applic TP HS ZENOBIA


   Last Admin: 01/14/19 22:00 Dose:  1 applic


Metronidazole (Flagyl 500mg Premixed Ivpb -)  500 mg in 100 mls @ 100 mls/hr 

IVPB Q8H-IV ZENOBIA


   Last Admin: 01/15/19 02:00 Dose:  100 mls/hr


Levofloxacin (Levaquin 750 Mg Premixed Ivpb -)  750 mg in 150 mls @ 100 mls/hr 

IVPB DAILY [facility]; Protocol


   Last Admin: 01/14/19 04:29 Dose:  100 mls/hr


Sodium Chloride (Normal Saline -)  1,000 mls @ 50 mls/hr IV ASDIR ZENOBIA


Mupirocin (Bactroban Ointment (For Decolonization) -)  1 applic NS BID [facility]


   Stop: 01/18/19 21:59


   Last Admin: 01/15/19 02:06 Dose:  Not Given


Pantoprazole Sodium (Protonix -)  40 mg PO DAILY [facility]


   Last Admin: 01/14/19 09:54 Dose:  40 mg








 Review of Systems 





Constitutional: denies: Chills, Fever


Cardiovascular: As Noted Above 


Respiratory: denies: Cough or Sputum Production


Gastrointestinal: denies: Nausea, Vomiting, Constipation or Abdominal Pain but 

reports recurrent rectal bleed


Genitourinary: denies: Dysuria


Musculoskeletal: No Symptoms Reported 


Neurological: denies: Dizziness, Headache








- Objective


Vital Signs: 


 


 Last Vital Signs











Temp Pulse Resp BP Pulse Ox


 


 100.2 F H  104 H  23 H  106/54 L  100 


 


 01/15/19 02:00  01/15/19 04:00  01/15/19 04:00  01/15/19 04:00  01/15/19 00:30








 Intake & Output











 01/12/19 01/13/19 01/14/19 01/15/19





 23:59 23:59 23:59 23:59


 


Intake Total 1540 4750 4210 200


 


Output Total   2450 300


 


Balance 1540 4750 1760 -100


 


Weight   235 lb 











Neck: Supple Negative JVD


Cardiovascular: S1 S2 Regular Rate and Rhythm 


Respiratory: Clear to A&P Bilaterally


Gastrointestinal: Soft Benign Normal Bowel Sounds


Ext: Negative Edema





Labs: 


 


 CBC, BMP





 01/15/19 04:25 





 01/15/19 04:25 








Assessment/Plan





ASSESSMENT:





1. Hematochezia persistent 


2. Acute anemia, referable to above


3. Sinus tachycardia referable to above


4. Abnormal EKG, ischemic T-wave abnormality, asymptomatic


5. Hyperglycemia





PLAN:





1. Transfuse to maintain Hgb equal or > 8.0


2. Follow CBC post transfusion


3. Repeat EKG


4. Obtain Echocardiography to evaluate LV systolic function


5. Future MPI study is recommended as outpatient for further evaluation of the 

above noted EKG abnormality














Ra Heath M.D.